# Patient Record
Sex: FEMALE | Race: WHITE | NOT HISPANIC OR LATINO | Employment: FULL TIME | URBAN - METROPOLITAN AREA
[De-identification: names, ages, dates, MRNs, and addresses within clinical notes are randomized per-mention and may not be internally consistent; named-entity substitution may affect disease eponyms.]

---

## 2017-01-02 DIAGNOSIS — F51.04 PSYCHOPHYSIOLOGICAL INSOMNIA: Primary | ICD-10-CM

## 2017-01-02 RX ORDER — ZOLPIDEM TARTRATE 5 MG/1
2.5-5 TABLET ORAL
Qty: 30 TABLET | Refills: 0 | Status: SHIPPED | OUTPATIENT
Start: 2017-01-02 | End: 2017-01-31

## 2017-01-02 NOTE — TELEPHONE ENCOUNTER
Ambien 5mg      Last Written Prescription Date:  10/11/2016  Last Fill Quantity: 30,   # refills: 2  Last Office Visit with [facility] prescribing provider: 7/19/2016  Future Office visit:       Routing refill request to provider for review/approval because:  Drug not on the [facility] refill protocol or controlled substance    Thank you,    Tom Schaffer, [facility] [place]  P. [phone]

## 2017-01-22 DIAGNOSIS — F51.04 PSYCHOPHYSIOLOGICAL INSOMNIA: Primary | ICD-10-CM

## 2017-01-22 RX ORDER — ZOLPIDEM TARTRATE 5 MG/1
2.5-5 TABLET ORAL
Qty: 30 TABLET | Refills: 0 | Status: CANCELLED | OUTPATIENT
Start: 2017-01-22

## 2017-01-22 NOTE — TELEPHONE ENCOUNTER
ambien 5mg      Last Written Prescription Date:  1/2/16  Last Fill Quantity: 30,   # refills: 0  Last Office Visit with [facility] prescribing provider: 7/19/16  Future Office visit:       Routing refill request to provider for review/approval because:  Drug not on the [facility] refill protocol or controlled substance    Thank you very kindly!  Laura Coulter [facility] [place]

## 2017-01-26 NOTE — TELEPHONE ENCOUNTER
Just checking to see if this is being approved or denied.  Thank you very kindly!  Laura Coulter [facility] [place]

## 2017-01-31 ENCOUNTER — OFFICE VISIT (OUTPATIENT)
Dept: FAMILY MEDICINE | Facility: CLINIC | Age: 57
End: 2017-01-31
Payer: COMMERCIAL

## 2017-01-31 VITALS
DIASTOLIC BLOOD PRESSURE: 72 MMHG | BODY MASS INDEX: 21.71 KG/M2 | HEIGHT: 62 IN | WEIGHT: 118 LBS | HEART RATE: 84 BPM | OXYGEN SATURATION: 100 % | SYSTOLIC BLOOD PRESSURE: 126 MMHG | TEMPERATURE: 97.2 F

## 2017-01-31 DIAGNOSIS — F51.04 PSYCHOPHYSIOLOGICAL INSOMNIA: Primary | ICD-10-CM

## 2017-01-31 DIAGNOSIS — J01.80 ACUTE NON-RECURRENT SINUSITIS OF OTHER SINUS: ICD-10-CM

## 2017-01-31 DIAGNOSIS — F17.200 NEEDS SMOKING CESSATION EDUCATION: ICD-10-CM

## 2017-01-31 DIAGNOSIS — Z28.01 VACCINATION NOT CARRIED OUT BECAUSE OF ACUTE ILLNESS: ICD-10-CM

## 2017-01-31 DIAGNOSIS — F17.200 TOBACCO USE DISORDER: ICD-10-CM

## 2017-01-31 PROCEDURE — 99214 OFFICE O/P EST MOD 30 MIN: CPT | Performed by: FAMILY MEDICINE

## 2017-01-31 RX ORDER — DOXYCYCLINE 100 MG/1
100 CAPSULE ORAL 2 TIMES DAILY
Qty: 14 CAPSULE | Refills: 0 | Status: SHIPPED | OUTPATIENT
Start: 2017-01-31 | End: 2017-02-07

## 2017-01-31 RX ORDER — ZOLPIDEM TARTRATE 5 MG/1
2.5-5 TABLET ORAL
Qty: 30 TABLET | Refills: 5 | Status: SHIPPED | OUTPATIENT
Start: 2017-01-31 | End: 2017-05-04

## 2017-01-31 ASSESSMENT — PAIN SCALES - GENERAL: PAINLEVEL: NO PAIN (0)

## 2017-01-31 NOTE — PROGRESS NOTES
"  SUBJECTIVE:                                                    Paco Medrano is a 57 year old female who presents to clinic today for the following health issues:    1. Medication Followup of Ambien    Taking Medication as prescribed: yes, every night    Side Effects: None    Medication Helping Symptoms: yes     2. ENT \"sinus\" Symptoms             Symptoms: cc Present Absent Comment   Fever/Chills  x  Chills, hot & cold, first symptom   Fatigue  x  First symptom   Muscle Aches   x    Eye Irritation   x    Sneezing   x    Nasal Tyrel/Drg  x     Sinus Pressure/Pain x x  Left, cheek > forehead, pressure behind eyes too. Current worst symptom   Loss of smell   x    Dental pain   x    Sore Throat   x    Swollen Glands   x    Ear Pain/Fullness   x    Cough   x    Wheeze   x    Chest Pain   x    Shortness of breath   x    Rash   x    Other  x  Stiff shoulders and neck     Symptom duration:  about 7-10 days   Symptom severity:  moderate   Treatments tried:  Advil cold & sinus & saline drops, increase fluids, & rest   Contacts:  work     Medications updated and reviewed.  Past, family and surgical history is updated and reviewed in the record.  ROS:  Other than noted above, general, HEENT, respiratory, cardiac and gastrointestinal systems are negative.       Any history above obtained by the Medical Assistant was reviewed by Dr. Rodney Desai MD, and edited when necessary.    This document serves as a record of the services and decisions personally performed and made by Dr. Desai. It was created on his behalf by Candace Alvarenga, a trained medical scribe. The creation of this document is based on the provider's statements to the medical scribe.  Candace Alvarenga January 31, 2017 4:04 PM   OBJECTIVE:                                                    /72 mmHg  Pulse 84  Temp(Src) 97.2  F (36.2  C) (Oral)  Ht 5' 1.75\" (1.568 m)  Wt 118 lb (53.524 kg)  BMI 21.77 kg/m2  SpO2 100%  Breastfeeding? No  Body mass " index is 21.77 kg/(m^2).  GENERAL APPEARANCE ADULT: Alert, no acute distress  EYES: PERRL, EOM normal, conjunctiva and lids normal  HENT: right TM normal, left TM normal, nose: mucosal erythema, frontal sinus tenderness left, maxillary sinus tenderness left, throat/mouth:normal, mucous membranes moist  NECK: No adenopathy,masses or thyromegaly  RESP: lungs clear to auscultation   CV: normal rate, regular rhythm, no murmur or gallop  SKIN: no suspicious lesions or rashes  NEURO: Alert, oriented, speech and mentation normal, Cranial nerves 2-12 are normal.  PSYCH: mentation appears normal, affect and mood normal     ASSESSMENT/PLAN:                                                    (F51.04) Psychophysiological insomnia  (primary encounter diagnosis)  Comment: refill request. As I stated before, the medicine is effective and I don't see a reason to discontinue this  Plan: zolpidem (AMBIEN) 5 MG tablet        Follow up in July     (J01.80) Acute non-recurrent sinusitis of other sinus  Comment: frontal and maxillary  Plan: doxycycline Monohydrate 100 MG CAPS        Follow up if symptoms worsen or fail to resolve by 2/10/17      (F17.200) Tobacco use disorder  (F17.200) Needs smoking cessation education  Comment:   Plan: NOVU Referral Smoking Cessation            (Z28.01) Vaccination not carried out because of acute illness  Comment: The patient is probably willing to get a vaccine once her sinus infection is cleared.  Plan:        The information in this document, created by the medical scribe for me, accurately reflects the services I personally performed and the decisions made by me. I have reviewed and approved this document for accuracy prior to leaving the patient care area.    Rodney Desai MD

## 2017-01-31 NOTE — PATIENT INSTRUCTIONS
================================================================================  Normal Values   Blood pressure  <140/90 for most adults    <130/80 for some chronic diseases (ask your care team about yours)    BMI (body mass index)  18.5-25 kg/m2 (based on height and weight)     Thank you for visiting [facility]    Normal or non-critical lab and imaging results will be communicated to you by MyChart, letter or phone within 7 days.  If you do not hear from us within 10 days, please call the clinic. If you have a critical or abnormal lab result, we will notify you by phone as soon as possible.     If you have any questions regarding your visit please contact:     Team Comfort:   Clinic Hours Telephone Number   Dr. Rodney Cedillo 7am-5pm  Monday - Friday [phone]  Markell RN  Celsa Carl RN   Pharmacy 8:00am-8pm Monday-Friday    9am-5pm Saturday-Sunday [phone]   Urgent Care 11am-9pm Monday-Friday        9am-5pm Saturday-Sunday [phone]     After hours, weekend or if you need to make an appointment with your primary provider please call [phone].   After Hours nurse advise: call [facility]: [phone]    Medication Refills:  Call your pharmacy and they will forward the refill to us. Please allow 3 business days for your refills to be completed.

## 2017-01-31 NOTE — NURSING NOTE
"Chief Complaint   Patient presents with     Recheck Medication     Ambien     Sinus Problem       Initial /72 mmHg  Pulse 84  Temp(Src) 97.2  F (36.2  C) (Oral)  Ht 5' 1.75\" (1.568 m)  Wt 118 lb (53.524 kg)  BMI 21.77 kg/m2  SpO2 100%  Breastfeeding? No Estimated body mass index is 21.77 kg/(m^2) as calculated from the following:    Height as of this encounter: 5' 1.75\" (1.568 m).    Weight as of this encounter: 118 lb (53.524 kg).  BP completed using cuff size: yane Wang MA      "

## 2017-01-31 NOTE — MR AVS SNAPSHOT
After Visit Summary   1/31/2017    Paco Medrano    MRN: [id number]           Patient Information     Date Of Birth          1960        Visit Information        Provider Department      1/31/2017 3:20 PM Rodney Desai MD [facility]        Today's Diagnoses     Psychophysiological insomnia    -  1     Acute non-recurrent sinusitis of other sinus         Tobacco use disorder         Needs smoking cessation education         Vaccination not carried out because of acute illness           Care Instructions        ================================================================================  Normal Values   Blood pressure  <140/90 for most adults    <130/80 for some chronic diseases (ask your care team about yours)    BMI (body mass index)  18.5-25 kg/m2 (based on height and weight)     Thank you for visiting [facility]    Normal or non-critical lab and imaging results will be communicated to you by MyChart, letter or phone within 7 days.  If you do not hear from us within 10 days, please call the clinic. If you have a critical or abnormal lab result, we will notify you by phone as soon as possible.     If you have any questions regarding your visit please contact:     Team Comfort:   Clinic Hours Telephone Number   Dr. Rodney [address] [facility] 7am-5pm  Monday - Friday [phone]  Markell Carl RN   Pharmacy 8:00am-8pm Monday-Friday    9am-5pm Saturday-Sunday [phone]   Urgent Care 11am-9pm Monday-Friday        9am-5pm Saturday-Sunday [phone]     After hours, weekend or if you need to make an appointment with your primary provider please call [phone].   After Hours nurse advise: call [facility]: [phone]    Medication Refills:  Call your pharmacy and they will forward the refill to us. Please allow 3 business days for your refills to be  "completed.                Follow-ups after your visit        Additional Services     [facility] Referral Smoking Cessation       [place] online at [email or web address]                  Follow-up notes from your care team     Return in about 10 days (around 2/10/2017) for if symptoms worsen or fail to resolve by then. Otherwise returnt for a recheck in July.      Who to contact     If you have questions or need follow up information about today's clinic visit or your schedule please contact [facility] directly at [phone].  Normal or non-critical lab and imaging results will be communicated to you by Flux Factoryhart, letter or phone within 4 business days after the clinic has received the results. If you do not hear from us within 7 days, please contact the clinic through Fl[employer] or phone. If you have a critical or abnormal lab result, we will notify you by phone as soon as possible.  Submit refill requests through DeepDyve or call your pharmacy and they will forward the refill request to us. Please allow 3 business days for your refill to be completed.          Additional Information About Your Visit        Flux Factoryhart Information     DeepDyve lets you send messages to your doctor, view your test results, renew your prescriptions, schedule appointments and more. To sign up, go to [email or web address] . Click on \"Log in\" on the left side of the screen, which will take you to the Welcome page. Then click on \"Sign up Now\" on the right side of the page.     You will be asked to enter the access code listed below, as well as some personal information. Please follow the directions to create your username and password.     Your access code is: [email or web address]  Expires: 3/9/2017  8:12 PM     Your access code will  in 90 days. If you need help or a new code, please call your [facility] clinic or [phone].        Care EveryWhere ID     This is your Care EveryWhere ID. This could be used by other " "organizations to access your [facility] medical records  [id number]        Your Vitals Were     Pulse Temperature Height BMI (Body Mass Index) Pulse Oximetry Breastfeeding?    84 97.2  F (36.2  C) (Oral) 5' 1.75\" (1.568 m) 21.77 kg/m2 100% No       Blood Pressure from Last 3 Encounters:   01/31/17 126/72   12/09/16 116/71   07/19/16 119/72    Weight from Last 3 Encounters:   01/31/17 118 lb (53.524 kg)   12/09/16 119 lb (53.978 kg)   07/19/16 119 lb (53.978 kg)              We Performed the Following     NOVU Referral Smoking Cessation          Today's Medication Changes          These changes are accurate as of: 1/31/17  4:13 PM.  If you have any questions, ask your nurse or doctor.               Start taking these medicines.        Dose/Directions    doxycycline Monohydrate 100 MG Caps   Used for:  Acute non-recurrent sinusitis of other sinus   Started by:  Rodney Desia MD        Dose:  100 mg   Take 1 capsule (100 mg) by mouth 2 times daily for 7 days for sinus infection.   Quantity:  14 capsule   Refills:  0            Where to get your medicines      These medications [place] [facility] [place] - [address]  [address], [facility] [address]     Phone:  [phone]    - doxycycline Monohydrate 100 MG Caps      Some of these will need a paper prescription and others can be bought over the counter.  Ask your nurse if you have questions.     Bring a paper prescription for each of these medications    - zolpidem 5 MG tablet             Primary Care Provider Office Phone # Fax #    Rodney Desai [phone]       [facility] [address]  [facility] [address]        Thank you!     Thank you for choosing [facility]  for your care. Our goal is always to provide you with excellent care. Hearing back from our patients is one way we can continue to improve our services. Please take a few minutes to complete " the written survey that you may receive in the mail after your visit with us. Thank you!             Your Updated Medication List - Protect others around you: Learn how to safely use, store and throw away your medicines at www.disposemymeds.org.          This list is accurate as of: 1/31/17  4:13 PM.  Always use your most recent med list.                   Brand Name Dispense Instructions for use    acetaminophen-codeine 300-30 MG per tablet    TYLENOL #3         ADVIL COLD & SINUS LIQUI-GELS  MG Caps   Generic drug:  Pseudoephedrine-Ibuprofen          ALEVE 220 MG capsule   Generic drug:  naproxen sodium          doxycycline Monohydrate 100 MG Caps     14 capsule    Take 1 capsule (100 mg) by mouth 2 times daily for 7 days for sinus infection.       zolpidem 5 MG tablet    AMBIEN    30 tablet    Take 0.5-1 tablets (2.5-5 mg) by mouth nightly as needed for sleep (take right at bedtime)

## 2017-05-03 ENCOUNTER — TELEPHONE (OUTPATIENT)
Dept: FAMILY MEDICINE | Facility: CLINIC | Age: 57
End: 2017-05-03

## 2017-05-03 DIAGNOSIS — F51.04 PSYCHOPHYSIOLOGICAL INSOMNIA: ICD-10-CM

## 2017-05-03 RX ORDER — ZOLPIDEM TARTRATE 5 MG/1
2.5-5 TABLET ORAL
Qty: 30 TABLET | Refills: 5 | Status: CANCELLED | OUTPATIENT
Start: 2017-05-03

## 2017-05-03 NOTE — TELEPHONE ENCOUNTER
Call pharmacy regarding message below.  Pharmacy stated that patient was in [place] and she transferred her Ambien from [facility] to [facility] in [place].  Patient is now back in [place] and those refill for Ambien is down in [facility].  By the state law, RX can only be transfer once and they can't transfer medication back to [place] per pharmacist.  Need new rx for Ambien send to [facility] in [place].    Marcos Brown MA

## 2017-05-03 NOTE — TELEPHONE ENCOUNTER
Reason for Call:  Other prescription    Detailed comments: [facility] calling for they attempted to do an order for Ambien for it did have a previous refills attached to the order  but it did not process.     Phone Number Pharmacy  can be reached at: Other phone number:   [phone]    Best Time: Anytime    Can we leave a detailed message on this number? YES    Call taken on 5/3/2017 at 10:00 AM by Charanjit Rothman

## 2017-05-03 NOTE — TELEPHONE ENCOUNTER
Reason for Call:  Other prescription    Detailed comments: Patient calling back to follow up on previous request to transfer Rx back to our pharmacy.      Phone Number Patient can be reached at: Home number on file [phone] (home)    Best Time: Any    Can we leave a detailed message on this number? YES    Call taken on 5/3/2017 at 5:53 PM by Terri Palencia

## 2017-05-03 NOTE — TELEPHONE ENCOUNTER
Last Rx was sent 1/31/17 for 6 month supply to  pharmacy - I am assuming refills were transferred to [facility].    MA: please call pharmacy to get more information. Are they asking for a PA or saying that Ambien is not covered?

## 2017-05-04 RX ORDER — ZOLPIDEM TARTRATE 5 MG/1
2.5-5 TABLET ORAL
Qty: 30 TABLET | Refills: 2 | Status: SHIPPED | OUTPATIENT
Start: 2017-05-04 | End: 2017-05-25

## 2017-05-04 NOTE — TELEPHONE ENCOUNTER
Patient called back wants this addressed ASAP as she has not slept for 2 days.    Dede Alexander RN, [facility]

## 2017-05-04 NOTE — TELEPHONE ENCOUNTER
Script is faxed to [facility] in [place] with a note for pharmacy to expedite the refill and call pt when medication is ready for pickup today.  Noe Leonard,  [facility]

## 2017-05-25 ENCOUNTER — OFFICE VISIT (OUTPATIENT)
Dept: FAMILY MEDICINE | Facility: CLINIC | Age: 57
End: 2017-05-25
Payer: COMMERCIAL

## 2017-05-25 VITALS
HEART RATE: 79 BPM | WEIGHT: 121 LBS | DIASTOLIC BLOOD PRESSURE: 78 MMHG | SYSTOLIC BLOOD PRESSURE: 124 MMHG | HEIGHT: 62 IN | OXYGEN SATURATION: 100 % | TEMPERATURE: 97.7 F | BODY MASS INDEX: 22.26 KG/M2

## 2017-05-25 DIAGNOSIS — J01.00 ACUTE NON-RECURRENT MAXILLARY SINUSITIS: Primary | ICD-10-CM

## 2017-05-25 DIAGNOSIS — M54.2 POSTERIOR NECK PAIN: ICD-10-CM

## 2017-05-25 DIAGNOSIS — F17.200 NEEDS SMOKING CESSATION EDUCATION: ICD-10-CM

## 2017-05-25 DIAGNOSIS — T70.1XXA: ICD-10-CM

## 2017-05-25 DIAGNOSIS — F51.04 PSYCHOPHYSIOLOGICAL INSOMNIA: ICD-10-CM

## 2017-05-25 PROCEDURE — 99214 OFFICE O/P EST MOD 30 MIN: CPT | Performed by: FAMILY MEDICINE

## 2017-05-25 RX ORDER — ETODOLAC 400 MG
400 TABLET ORAL 2 TIMES DAILY
Qty: 60 TABLET | Refills: 0 | Status: SHIPPED | OUTPATIENT
Start: 2017-05-25 | End: 2017-09-15

## 2017-05-25 RX ORDER — ZOLPIDEM TARTRATE 5 MG/1
2.5-5 TABLET ORAL
Qty: 30 TABLET | Refills: 1 | Status: SHIPPED | OUTPATIENT
Start: 2017-05-25 | End: 2017-07-18

## 2017-05-25 RX ORDER — CEFUROXIME AXETIL 250 MG/1
250 TABLET ORAL 2 TIMES DAILY
Qty: 20 TABLET | Refills: 0 | Status: SHIPPED | OUTPATIENT
Start: 2017-05-25 | End: 2017-06-04

## 2017-05-25 NOTE — PATIENT INSTRUCTIONS
This summary includes the important diagnoses, test, medications and other important parts of your medical history.  Below are a few good we sites you can use to learn more about these.     Www.[facility].org : Up to date and easily searchable information on multiple topics.  [email or web address].[email or web address] : [facility] $4.99 medications  Www.medlineplus.gov : medication info, interactive tutorials, watch real surgeries online  Www.familydoctor.org : good info from the Academy of Family Physicians  Www.[facility].com : good info from the [facility]  Www.cdc.gov : public health info, travel advisories, epidemics (H1N1)  Www.aap.org : children's health info, normal development, vaccinations  Www.health.[facility].[place].us : [place] dept of heat, public health issues in [place], N1N1    Based on your medical history and these are the current health maintenance or preventive care services that you are due for (some may have been done at this visit:)  There are no preventive care reminders to display for this patient.  =================================================================================  Normal Values   Blood pressure  <140/90 for most adults    <130/80 for some chronic diseases (ask your care team about yours)    BMI (body mass index)  18.5-25 kg/m2 (based on height and weight)     Thank you for visiting [facility]    Normal or non-critical lab and imaging results will be communicated to you by MyChart, letter or phone within 7 days.  If you do not hear from us within 10 days, please call the clinic. If you have a critical or abnormal lab result, we will notify you by phone as soon as possible.     If you have any questions regarding your visit please contact:     Team Comfort:   Clinic Hours Telephone Number   Dr. Rodney Samano   7am-5pm  Monday - Friday [phone]  Dustin DOUGLAS   Pharmacy 8am-8pm  Monday-Thursday      8am-6pm Friday  9am-5pm Saturday-Sunday [phone]   Urgent Care 11am-8pm Monday-Friday        9am-5pm Saturday-Sunday [phone]     After hours, weekend or if you need to make an appointment with your primary provider please call [phone].   After Hours nurse advise: call [facility]: [phone]    Medication Refills:  Call your pharmacy and they will forward the refill to us. Please allow 3 business days for your refills to be completed.    Use FunCaptcha (secure email communication and access to your chart) to send your primary care provider a message or make an appointment. Ask someone on your Team how to sign up for FunCaptcha. To log on to [employer] or for more information in [employer] please visit the website at [email or web address].  As of October 8, 2013, all password changes, disabled accounts, or ID changes in FunCaptcha/MyHealth will be done by our Access Services Department.   If you need help with your account or password, call: [phone]. Clinic staff no longer has the ability to change passwords.

## 2017-05-25 NOTE — NURSING NOTE
"Chief Complaint   Patient presents with     Sinus Problem     Musculoskeletal Problem     left shoulder ache due to lifting       Initial /78 (BP Location: Left arm, Patient Position: Chair, Cuff Size: Adult Regular)  Pulse 79  Temp 97.7  F (36.5  C) (Oral)  Ht 5' 1.75\" (1.568 m)  Wt 121 lb (54.9 kg)  SpO2 100%  BMI 22.31 kg/m2 Estimated body mass index is 22.31 kg/(m^2) as calculated from the following:    Height as of this encounter: 5' 1.75\" (1.568 m).    Weight as of this encounter: 121 lb (54.9 kg).  Medication Reconciliation: complete     Marcos Brown MA      "

## 2017-05-25 NOTE — MR AVS SNAPSHOT
After Visit Summary   5/25/2017    Paco Medrano    MRN: [id number]           Patient Information     Date Of Birth          1960        Visit Information        Provider Department      5/25/2017 3:40 PM Rodney Desai MD [facility]        Today's Diagnoses     Acute non-recurrent maxillary sinusitis    -  1    Posterior neck pain        Sinus barotrauma, initial encounter        Psychophysiological insomnia        Needs smoking cessation education          Care Instructions    This summary includes the important diagnoses, test, medications and other important parts of your medical history.  Below are a few good we sites you can use to learn more about these.     Www.Tianji.VISENZE : Up to date and easily searchable information on multiple topics.  Www.Tianji.VISENZE/Pharmacy/c_539084.asp : [facility] $4.99 medications  Www.Connoshoer.gov : medication info, interactive tutorials, watch real surgeries online  Www.familydoctor.org : good info from the Academy of Family Physicians  Www.[employer].Orlumet : good info from the [facility]  Www.cdc.gov : public health info, travel advisories, epidemics (H1N1)  Www.aap.org : children's health info, normal development, vaccinations  Www.health.[facility].[place].us : [place] dept of heatlh, public health issues in [place], N1N1    Based on your medical history and these are the current health maintenance or preventive care services that you are due for (some may have been done at this visit:)  There are no preventive care reminders to display for this patient.  =================================================================================  Normal Values   Blood pressure  <140/90 for most adults    <130/80 for some chronic diseases (ask your care team about yours)    BMI (body mass index)  18.5-25 kg/m2 (based on height and weight)     Thank you for visiting [facility]    Normal or non-critical lab and imaging results  will be communicated to you by MyChart, letter or phone within 7 days.  If you do not hear from us within 10 days, please call the clinic. If you have a critical or abnormal lab result, we will notify you by phone as soon as possible.     If you have any questions regarding your visit please contact:     Team Comfort:   Clinic Hours Telephone Number   Dr. Rodney Samano   7am-5pm  Monday - Friday [phone]  [facility] DOUGLAS   Pharmacy 8am-8pm Monday-Thursday      8am-6pm Friday  9am-5pm Saturday-Sunday [phone]   Urgent Care 11am-8pm Monday-Friday        9am-5pm Saturday-Sunday [phone]     After hours, weekend or if you need to make an appointment with your primary provider please call [phone].   After Hours nurse advise: call [place] [facility]: [phone]    Medication Refills:  Call your pharmacy and they will forward the refill to us. Please allow 3 business days for your refills to be completed.    Use [employer] (secure email communication and access to your chart) to send your primary care provider a message or make an appointment. Ask someone on your Team how to sign up for [employer].. To log on to Glassbeam or for more information in Minutizer please visit the website at www.[email or web address]/[employer]..  As of October 8, 2013, all password changes, disabled accounts, or ID changes in [employer]./MyHealth will be done by our Access Services Department.   If you need help with your account or password, call: [phone]. Clinic staff no longer has the ability to change passwords.             Follow-ups after your visit        Additional Services     NOVU Referral Smoking Cessation       Grant online at [email or web address]            OTOLARYNGOLOGY REFERRAL       Your provider has referred you to: JOSE: [facility] [phone]   [email or web address]  JOSE: Sahara Fenton  [facility] [place] [phone]   [email or web address]  FMG: [facility] [place] [phone]   [email or web address]  UMP: [facility] (Otolaryngology) [facility] [phone]  [email or web address]  UMP: [facility] [phone]   [email or web address]/[facility]/    Please be aware that coverage of these services is subject to the terms and limitations of your health insurance plan.  Call member services at your health plan with any benefit or coverage questions.      Please bring the following with you to your appointment:    (1) Any X-Rays, CTs or MRIs which have been performed.  Contact the facility where they were done to arrange for  prior to your scheduled appointment.   (2) List of current medications  (3) This referral request   (4) Any documents/labs given to you for this referral                  Follow-up notes from your care team     Return in about 2 weeks (around 6/8/2017) for recheck if symptoms have not resolved by then. .      Who to contact     If you have questions or need follow up information about today's clinic visit or your schedule please contact [facility] directly at [phone].  Normal or non-critical lab and imaging results will be communicated to you by MyChart, letter or phone within 4 business days after the clinic has received the results. If you do not hear from us within 7 days, please contact the clinic through MyChart or phone. If you have a critical or abnormal lab result, we will notify you by phone as soon as possible.  Submit refill requests through [employer] or call your pharmacy and they will forward the refill request to us. Please allow 3 business days for your refill to be completed.          Additional Information About Your Visit    "     MyChart Information     [employer] lets you send messages to your doctor, view your test results, renew your prescriptions, schedule appointments and more. To sign up, go to [email or web address] . Click on \"Log in\" on the left side of the screen, which will take you to the Welcome page. Then click on \"Sign up Now\" on the right side of the page.     You will be asked to enter the access code listed below, as well as some personal information. Please follow the directions to create your username and password.     Your access code is: [email or web address]  Expires: 2017  4:16 PM     Your access code will  in 90 days. If you need help or a new code, please call your [place] clinic or [phone].        Care EveryWhere ID     This is your Care EveryWhere ID. This could be used by other organizations to access your [place] medical records  [id number]        Your Vitals Were     Pulse Temperature Height Pulse Oximetry BMI (Body Mass Index)       79 97.7  F (36.5  C) (Oral) 1.568 m (5' 1.75\") 100% 22.31 kg/m2        Blood Pressure from Last 3 Encounters:   17 124/78   17 126/72   16 116/71    Weight from Last 3 Encounters:   17 54.9 kg (121 lb)   17 53.5 kg (118 lb)   16 54 kg (119 lb)              We Performed the Following     NOVU Referral Smoking Cessation     OTOLARYNGOLOGY REFERRAL          Today's Medication Changes          These changes are accurate as of: 17  4:16 PM.  If you have any questions, ask your nurse or doctor.               Start taking these medicines.        Dose/Directions    cefuroxime 250 MG tablet   Commonly known as:  CEFTIN   Used for:  Acute non-recurrent maxillary sinusitis   Started by:  Rodney Desai MD        Dose:  250 mg   Take 1 tablet (250 mg) by mouth 2 times daily for 10 days for sinus infection.   Quantity:  20 tablet   Refills:  0       etodolac 400 MG tablet   Commonly known as:  LODINE   Used for:  Posterior neck pain "   Started by:  Rodney Desai MD        Dose:  400 mg   Take 1 tablet (400 mg) by mouth 2 times daily As needed for neck/shoulder pain.   Quantity:  60 tablet   Refills:  0            Where to get your medicines      These medications were sent to [facility] [place], [place] - [address]  [address], [place] [address]     Phone:  [phone]     cefuroxime 250 MG tablet    etodolac 400 MG tablet         Some of these will need a paper prescription and others can be bought over the counter.  Ask your nurse if you have questions.     Bring a paper prescription for each of these medications     zolpidem 5 MG tablet                Primary Care Provider Office Phone # Fax #    Rodney Desai [phone]       [facility] [address]  [facility] [address]        Thank you!     Thank you for choosing [facility]  for your care. Our goal is always to provide you with excellent care. Hearing back from our patients is one way we can continue to improve our services. Please take a few minutes to complete the written survey that you may receive in the mail after your visit with us. Thank you!             Your Updated Medication List - Protect others around you: Learn how to safely use, store and throw away your medicines at www.disposemymeds.org.          This list is accurate as of: 5/25/17  4:16 PM.  Always use your most recent med list.                   Brand Name Dispense Instructions for use    ADVIL COLD & SINUS LIQUI-GELS  MG Caps   Generic drug:  Pseudoephedrine-Ibuprofen          cefuroxime 250 MG tablet    CEFTIN    20 tablet    Take 1 tablet (250 mg) by mouth 2 times daily for 10 days for sinus infection.       etodolac 400 MG tablet    LODINE    60 tablet    Take 1 tablet (400 mg) by mouth 2 times daily As needed for neck/shoulder pain.       zolpidem 5 MG tablet    AMBIEN    30 tablet    Take 0.5-1 tablets  (2.5-5 mg) by mouth nightly as needed for sleep (take right at bedtime)

## 2017-05-25 NOTE — PROGRESS NOTES
SUBJECTIVE:                                                    Paco Medrano is a 57 year old female who presents to clinic today for the following health issues:        Acute Illness   Acute illness concerns: sinus  Onset: 2 weeks     Fever: no    Chills/Sweats: YES    Headache (location?): YES, initial symptom    Sinus Pressure:YES, initial symptom, worst symptom    Conjunctivitis:  no    Ear Pain: no    Rhinorrhea: no    Congestion: YES    Sore Throat: no     Cough: no    Wheeze: no    Decreased Appetite: no    Nausea: no    Vomiting: no    Diarrhea:  no    Dysuria/Freq.: no    Fatigue/Achiness: YES- pulled muscle from lifting on left shoulder    Sick/Strep Exposure: patient at pharmacy     Therapies Tried and outcome: ibuprofen, cold sinus otc, little relief      Medications updated and reviewed.  Past, family and surgical history is updated and reviewed in the record.  ROS:  Other than noted above, general, HEENT, respiratory, cardiac and gastrointestinal systems are negative.  MS: 2 days ago, strained her neck while lifting heavy objects, now has pain along the left posterior neck.   ENT: Patient reports that she gets sever frontal pain while descending in an airplane.  Occasionally there will be blood when she blows her nose afterwards. She reports a history of recurrent sinus infections, and sinus surgery has been recommended to her remotely.    Any history above obtained by the Medical Assistant was reviewed by Dr. Rodney Desai MD, and edited when necessary.    This document serves as a record of the services and decisions personally performed and made by Dr. Desai. It was created on his behalf by Rodney Rich, a trained medical scribe. The creation of this document is based the provider's statements to the medical scribe.  May 25, 2017, 4:05 PM     OBJECTIVE:  /78 (BP Location: Left arm, Patient Position: Chair, Cuff Size: Adult Regular)  Pulse 79  Temp 97.7  F (36.5  C) (Oral)  Ht 5'  "1.75\" (1.568 m)  Wt 121 lb (54.9 kg)  SpO2 100%  BMI 22.31 kg/m2  GENERAL APPEARANCE ADULT: Alert, no acute distress  EYES: PERRL, EOM normal, conjunctiva and lids normal  HENT: right TM normal, left TM normal, nose clear rhinorrhea, frontal sinus tenderness minimal, maxillary sinus tenderness left, throat/mouth:normal, mucous membranes moist  NECK: No adenopathy,masses or thyromegaly  RESP: lungs clear to auscultation   CV: normal rate, regular rhythm, no murmur or gallop  MS: extremities normal, no peripheral edema  SKIN: no suspicious lesions or rashes  NEURO: Alert, oriented, speech and mentation normal, Cranial nerves 2-12 are normal.  PSYCH: mentation appears normal., affect and mood normal    ASSESSMENT/PLAN:  (J01.00) Acute non-recurrent maxillary sinusitis  (primary encounter diagnosis)  Comment: Left  Plan: cefuroxime (CEFTIN) 250 MG tablet        Follow up if symptoms worsen, or fail to resolve after 14 days.    (M54.2) Posterior neck pain  Comment: Left  Plan: etodolac (LODINE) 400 MG tablet            (T70.1XXA) Sinus barotrauma, initial encounter  Comment:   Plan: OTOLARYNGOLOGY REFERRAL            (F51.04) Psychophysiological insomnia  Comment: Patient reports decreased effectiveness from the  used by [facility]   Plan: zolpidem (AMBIEN) 5 MG tablet        She will fill these at our pharmacy from now on    (F17.200) Needs smoking cessation education  Comment:   Plan: NOVU Referral Smoking Cessation              The information in this document, created by the medical scribe for me, accurately reflects the services I personally performed and the decisions made by me. I have reviewed and approved this document for accuracy prior to leaving the patient care area.  Rodney Desai MD  "

## 2017-06-17 ENCOUNTER — OFFICE VISIT (OUTPATIENT)
Dept: URGENT CARE | Facility: URGENT CARE | Age: 57
End: 2017-06-17
Payer: COMMERCIAL

## 2017-06-17 VITALS
HEART RATE: 89 BPM | DIASTOLIC BLOOD PRESSURE: 68 MMHG | OXYGEN SATURATION: 98 % | BODY MASS INDEX: 22.13 KG/M2 | WEIGHT: 120 LBS | TEMPERATURE: 97.5 F | SYSTOLIC BLOOD PRESSURE: 104 MMHG

## 2017-06-17 DIAGNOSIS — J01.01 ACUTE RECURRENT MAXILLARY SINUSITIS: Primary | ICD-10-CM

## 2017-06-17 PROCEDURE — 99213 OFFICE O/P EST LOW 20 MIN: CPT | Performed by: PHYSICIAN ASSISTANT

## 2017-06-17 RX ORDER — DOXYCYCLINE 100 MG/1
100 CAPSULE ORAL 2 TIMES DAILY
Qty: 42 CAPSULE | Refills: 0 | Status: SHIPPED | OUTPATIENT
Start: 2017-06-17 | End: 2017-07-08

## 2017-06-17 RX ORDER — FLUTICASONE PROPIONATE 50 MCG
1-2 SPRAY, SUSPENSION (ML) NASAL DAILY
Qty: 1 BOTTLE | Refills: 0 | Status: SHIPPED | OUTPATIENT
Start: 2017-06-17 | End: 2018-03-27

## 2017-06-17 NOTE — MR AVS SNAPSHOT
"              After Visit Summary   2017    Paco Medrano    MRN: [id number]           Patient Information     Date Of Birth          1960        Visit Information        Provider Department      2017 4:20 PM Jaylyn Amezquita PA-C [facility]        Today's Diagnoses     Acute recurrent maxillary sinusitis    -  1       Follow-ups after your visit        Who to contact     If you have questions or need follow up information about today's clinic visit or your schedule please contact [facility] directly at [phone].  Normal or non-critical lab and imaging results will be communicated to you by [employer], letter or phone within 4 business days after the clinic has received the results. If you do not hear from us within 7 days, please contact the clinic through [employer] or phone. If you have a critical or abnormal lab result, we will notify you by phone as soon as possible.  Submit refill requests through [employer] or call your pharmacy and they will forward the refill request to us. Please allow 3 business days for your refill to be completed.          Additional Information About Your Visit        MyChart Information     [employer] lets you send messages to your doctor, view your test results, renew your prescriptions, schedule appointments and more. To sign up, go to [email or web address] . Click on \"Log in\" on the left side of the screen, which will take you to the Welcome page. Then click on \"Sign up Now\" on the right side of the page.     You will be asked to enter the access code listed below, as well as some personal information. Please follow the directions to create your username and password.     Your access code is: [email or web address]  Expires: 2017  4:16 PM     Your access code will  in 90 days. If you need help or a new code, please call your [facility] or [phone].        Care EveryWhere ID     This is your Care EveryWhere ID. This " could be used by other organizations to access your [place] medical records  [id number]        Your Vitals Were     Pulse Temperature Pulse Oximetry BMI (Body Mass Index)          89 97.5  F (36.4  C) (Oral) 98% 22.13 kg/m2         Blood Pressure from Last 3 Encounters:   06/17/17 104/68   05/25/17 124/78   01/31/17 126/72    Weight from Last 3 Encounters:   06/17/17 120 lb (54.4 kg)   05/25/17 121 lb (54.9 kg)   01/31/17 118 lb (53.5 kg)              Today, you had the following     No orders found for display         Today's Medication Changes          These changes are accurate as of: 6/17/17  4:41 PM.  If you have any questions, ask your nurse or doctor.               Start taking these medicines.        Dose/Directions    doxycycline 100 MG capsule   Commonly known as:  VIBRAMYCIN   Used for:  Acute recurrent maxillary sinusitis   Started by:  Jaylyn Amezquita PA-C        Dose:  100 mg   Take 1 capsule (100 mg) by mouth 2 times daily for 21 days   Quantity:  42 capsule   Refills:  0       fluticasone 50 MCG/ACT spray   Commonly known as:  FLONASE   Used for:  Acute recurrent maxillary sinusitis   Started by:  Jaylyn Amezquita PA-C        Dose:  1-2 spray   Spray 1-2 sprays into both nostrils daily   Quantity:  1 Bottle   Refills:  0            Where to get your medicines      These [place] [facility] [place] [address]  [address], [facility] [address]     Phone:  [phone]     doxycycline 100 MG capsule    fluticasone 50 MCG/ACT spray                Primary Care Provider Office Phone # Fax #    Rodney Desai [phone]       [facility] [address]  [facility] [address]        Thank you!     Thank you for choosing [facility]  for your care. Our goal is always to provide you with excellent care. Hearing back from our patients is one way we can continue to improve our  services. Please take a few minutes to complete the written survey that you may receive in the mail after your visit with us. Thank you!             Your Updated Medication List - Protect others around you: Learn how to safely use, store and throw away your medicines at www.disposemymeds.org.          This list is accurate as of: 6/17/17  4:41 PM.  Always use your most recent med list.                   Brand Name Dispense Instructions for use    ADVIL COLD & SINUS LIQUI-GELS  MG Caps   Generic drug:  Pseudoephedrine-Ibuprofen          doxycycline 100 MG capsule    VIBRAMYCIN    42 capsule    Take 1 capsule (100 mg) by mouth 2 times daily for 21 days       etodolac 400 MG tablet    LODINE    60 tablet    Take 1 tablet (400 mg) by mouth 2 times daily As needed for neck/shoulder pain.       fluticasone 50 MCG/ACT spray    FLONASE    1 Bottle    Spray 1-2 sprays into both nostrils daily       zolpidem 5 MG tablet    AMBIEN    30 tablet    Take 0.5-1 tablets (2.5-5 mg) by mouth nightly as needed for sleep (take right at bedtime)

## 2017-06-17 NOTE — NURSING NOTE
"Chief Complaint   Patient presents with     URI     couple of weeks       Initial /68 (BP Location: Left arm, Patient Position: Chair, Cuff Size: Adult Regular)  Pulse 89  Temp 97.5  F (36.4  C) (Oral)  Wt 120 lb (54.4 kg)  SpO2 98%  BMI 22.13 kg/m2 Estimated body mass index is 22.13 kg/(m^2) as calculated from the following:    Height as of 5/25/17: 5' 1.75\" (1.568 m).    Weight as of this encounter: 120 lb (54.4 kg).  Medication Reconciliation: complete     Kelli Samuels CMA      "

## 2017-06-17 NOTE — PROGRESS NOTES
SUBJECTIVE:                                                    Paco Medrano is a 57 year old female who presents to clinic today for the following health issues:      RESPIRATORY SYMPTOMS      Duration: 2 weeks    Description  nasal congestion, rhinorrhea, sore throat, facial pain/pressure, fever, chills, ear pain left, headache, fatigue/malaise and myalgias    Severity: moderate    Accompanying signs and symptoms: None    History (predisposing factors):  none    Precipitating or alleviating factors: None    Therapies tried and outcome:  rest and fluids oral decongestant antihistamine acetaminophen OTC NSAID nasal spray/wash - no relief     Cannot see ENT until July. 10 days Ceftin 250, no help    Allergies   Allergen Reactions     Azithromycin Nausea     Erythromycin      gi upset     Penicillins Unknown     Childhood     Zithromax [Azithromycin Dihydrate]      Septra [Sulfamethoxazole W-Trimethoprim] Rash     Trimethoprim Rash       No past medical history on file.      Current Outpatient Prescriptions on File Prior to Visit:  Pseudoephedrine-Ibuprofen (ADVIL COLD & SINUS LIQUI-GELS)  MG CAPS    etodolac (LODINE) 400 MG tablet Take 1 tablet (400 mg) by mouth 2 times daily As needed for neck/shoulder pain. (Patient not taking: Reported on 6/17/2017)   zolpidem (AMBIEN) 5 MG tablet Take 0.5-1 tablets (2.5-5 mg) by mouth nightly as needed for sleep (take right at bedtime)     No current facility-administered medications on file prior to visit.     Social History   Substance Use Topics     Smoking status: Current Every Day Smoker     Start date: 1/9/2012     Smokeless tobacco: Never Used     Alcohol use 0.0 oz/week     0 Standard drinks or equivalent per week       ROS:  Consitutional: As above  ENT: As above  Respiratory: As above    OBJECTIVE:  /68 (BP Location: Left arm, Patient Position: Chair, Cuff Size: Adult Regular)  Pulse 89  Temp 97.5  F (36.4  C) (Oral)  Wt 120 lb (54.4 kg)  SpO2 98%   BMI 22.13 kg/m2  GENERAL APPEARANCE: healthy, alert and no distress  EYES: conjunctiva clear  EARS: No cerumen.   Ear canals w/o erythema, TM's intact w/o erythema.    NOSE/MOUTH: Nose and mouth without ulcers, erythema or lesions  SINUSES: Left maxillary sinus tenderness, moderate.  THROAT: No erythema w/o tonsillar enlargement . No exudates  NECK: supple, nontender, no lymphadenopathy  RESP: lungs clear to auscultation - no rales, rhonchi or wheezes  CV: regular rates and rhythm, normal S1 S2, no murmur noted  NEURO: awake, alert        ASSESSMENT: Well appearing.    ICD-10-CM    1. Acute recurrent maxillary sinusitis J01.01 doxycycline (VIBRAMYCIN) 100 MG capsule     fluticasone (FLONASE) 50 MCG/ACT spray     PLAN:If resolved at 14 days may stop antibiotic, given 21 days worth  Lots of rest and fluids.  RTC if any worsening symptoms or if not improving.    Jaylyn Amezquita PA-C

## 2017-07-18 DIAGNOSIS — F51.04 PSYCHOPHYSIOLOGICAL INSOMNIA: ICD-10-CM

## 2017-07-18 NOTE — TELEPHONE ENCOUNTER
Zolpidem Tartrate 5MG TABS      Last Written Prescription Date:  05/25/2017  Last Fill Quantity: 30,   # refills: 1  [facility] prescribing provider: 06/17/2017  Future Office visit:       Routing refill request to provider for review/approval because:  Drug not on the [facility] refill protocol or controlled substance    Thank You  Stanton Ayala  Pharmacy Technician  [facility]

## 2017-07-19 RX ORDER — ZOLPIDEM TARTRATE 5 MG/1
2.5-5 TABLET ORAL
Qty: 30 TABLET | Refills: 1 | Status: SHIPPED | OUTPATIENT
Start: 2017-07-19 | End: 2017-09-21

## 2017-07-19 NOTE — TELEPHONE ENCOUNTER
Written rx signed by Dr. Cedillo and will be deliver to the pharmacy this afternoon.  Our pharmacy will notify pt when ready for pickup.  Noe Leonard,  [facility]

## 2017-09-08 ENCOUNTER — TELEPHONE (OUTPATIENT)
Dept: FAMILY MEDICINE | Facility: CLINIC | Age: 57
End: 2017-09-08

## 2017-09-08 NOTE — TELEPHONE ENCOUNTER
Called and left detailed msg for pt she will need an appt before medications can be prescribed. Pt may call our appt line to schedule the appt.  Noe Leonard,  [employer] [facility]

## 2017-09-08 NOTE — TELEPHONE ENCOUNTER
.Reason for Call:    sinus infection    Detailed comments: wondering if needs to be seen or can an PRESCRIPTION  can be called in to [facility]    Phone Number Patient can be reached at: Home number on file [phone] (home)    Best Time: anytime    Can we leave a detailed message on this number? YES    Call taken on 9/8/2017 at 12:55 PM by Wandy Quick

## 2017-09-15 ENCOUNTER — OFFICE VISIT (OUTPATIENT)
Dept: URGENT CARE | Facility: URGENT CARE | Age: 57
End: 2017-09-15
Payer: COMMERCIAL

## 2017-09-15 VITALS
TEMPERATURE: 97.6 F | OXYGEN SATURATION: 99 % | WEIGHT: 119 LBS | HEART RATE: 101 BPM | SYSTOLIC BLOOD PRESSURE: 122 MMHG | BODY MASS INDEX: 21.94 KG/M2 | DIASTOLIC BLOOD PRESSURE: 72 MMHG

## 2017-09-15 DIAGNOSIS — J01.01 ACUTE RECURRENT MAXILLARY SINUSITIS: Primary | ICD-10-CM

## 2017-09-15 PROCEDURE — 99213 OFFICE O/P EST LOW 20 MIN: CPT | Performed by: STUDENT IN AN ORGANIZED HEALTH CARE EDUCATION/TRAINING PROGRAM

## 2017-09-15 RX ORDER — DOXYCYCLINE 100 MG/1
100 CAPSULE ORAL 2 TIMES DAILY
Qty: 20 CAPSULE | Refills: 0 | Status: SHIPPED | OUTPATIENT
Start: 2017-09-15 | End: 2021-02-17

## 2017-09-15 NOTE — MR AVS SNAPSHOT
"              After Visit Summary   9/15/2017    Paco Medrano    MRN: [id number]           Patient Information     Date Of Birth          1960        Visit Information        Provider Department      9/15/2017 8:05 PM Palomo Zafar PA-C [facility]        Today's Diagnoses     Acute recurrent maxillary sinusitis    -  1       Follow-ups after your visit        Follow-up notes from your care team     Return for Recheck If symptoms worsen or fail to improve..      Who to contact     If you have questions or need follow up information about today's clinic visit or your schedule please contact [facility] directly at [phone].  Normal or non-critical lab and imaging results will be communicated to you by MyChart, letter or phone within 4 business days after the clinic has received the results. If you do not hear from us within 7 days, please contact the clinic through MyChart or phone. If you have a critical or abnormal lab result, we will notify you by phone as soon as possible.  Submit refill requests through [email or web address] or call your pharmacy and they will forward the refill request to us. Please allow 3 business days for your refill to be completed.          Additional Information About Your Visit        MyChart Information     [email or web address] lets you send messages to your doctor, view your test results, renew your prescriptions, schedule appointments and more. To sign up, go to [email or web address] . Click on \"Log in\" on the left side of the screen, which will take you to the Welcome page. Then click on \"Sign up Now\" on the right side of the page.     You will be asked to enter the access code listed below, as well as some personal information. Please follow the directions to create your username and password.     Your access code is: [email or web address]  Expires: 2017  8:31 PM     Your access code will  in 90 days. If you need help or a new code, please " call your [place] clinic or [phone].        Care EveryWhere ID     This is your Care EveryWhere ID. This could be used by other organizations to access your [place] medical records  [id number]        Your Vitals Were     Pulse Temperature Pulse Oximetry BMI (Body Mass Index)          101 97.6  F (36.4  C) (Oral) 99% 21.94 kg/m2         Blood Pressure from Last 3 Encounters:   09/15/17 122/72   06/17/17 104/68   05/25/17 124/78    Weight from Last 3 Encounters:   09/15/17 119 lb (54 kg)   06/17/17 120 lb (54.4 kg)   05/25/17 121 lb (54.9 kg)              Today, you had the following     No orders found for display         Today's Medication Changes          These changes are accurate as of: 9/15/17  8:31 PM.  If you have any questions, ask your nurse or doctor.               Start taking these medicines.        Dose/Directions    doxycycline Monohydrate 100 MG Caps   Used for:  Acute recurrent maxillary sinusitis        Dose:  100 mg   Take 1 capsule (100 mg) by mouth 2 times daily for 10 days   Quantity:  20 capsule   Refills:  0            Where to get your medicines      These medications were sent to [facility] [address] [place] [address] [address] [place] AT [facility] [address]  [address] [place] [address]     Phone:  [phone]     doxycycline Monohydrate 100 MG Caps                Primary Care Provider Office Phone # Fax #    Rodney Desai [phone] [address]  [place] [facility] [address]        Equal Access to Services     [facility]: Hadii aad ku hadasho Soomaali, waaxda luqadaha, qaybta kaalmada kervin smith . So [facility] [phone].    ATENCIÓN: Si habla español, tiene a carney disposición servicios gratuitos de asistencia lingüística. Llame al [phone].    We comply with applicable federal civil rights laws and [place] laws. We do not discriminate on the basis of race, color, national origin,  age, disability sex, sexual orientation or gender identity.            Thank you!     Thank you for choosing [facility] [place]  for your care. Our goal is always to provide you with excellent care. Hearing back from our patients is one way we can continue to improve our services. Please take a few minutes to complete the written survey that you may receive in the mail after your visit with us. Thank you!             Your Updated Medication List - Protect others around you: Learn how to safely use, store and throw away your medicines at www.disposemymeds.org.          This list is accurate as of: 9/15/17  8:31 PM.  Always use your most recent med list.                   Brand Name Dispense Instructions for use Diagnosis    ADVIL COLD & SINUS LIQUI-GELS  MG Caps   Generic drug:  Pseudoephedrine-Ibuprofen           doxycycline Monohydrate 100 MG Caps     20 capsule    Take 1 capsule (100 mg) by mouth 2 times daily for 10 days    Acute recurrent maxillary sinusitis       fluticasone 50 MCG/ACT spray    FLONASE    1 Bottle    Spray 1-2 sprays into both nostrils daily    Acute recurrent maxillary sinusitis       zolpidem 5 MG tablet    AMBIEN    30 tablet    Take 0.5-1 tablets (2.5-5 mg) by mouth nightly as needed for sleep (take right at bedtime)    Psychophysiological insomnia

## 2017-09-16 NOTE — NURSING NOTE
"Chief Complaint   Patient presents with     URI     since Labor Day       Initial /72 (BP Location: Left arm, Patient Position: Chair, Cuff Size: Adult Regular)  Pulse 101  Temp 97.6  F (36.4  C) (Oral)  Wt 119 lb (54 kg)  SpO2 99%  BMI 21.94 kg/m2 Estimated body mass index is 21.94 kg/(m^2) as calculated from the following:    Height as of 5/25/17: 5' 1.75\" (1.568 m).    Weight as of this encounter: 119 lb (54 kg).  Medication Reconciliation: complete     Kelli Samuels CMA      "

## 2017-09-16 NOTE — PROGRESS NOTES
SUBJECTIVE:   Paco Medrano is a 57 year old female who is here today with:  CC:nasal congestion, headache.and symptoms of fever.    Serious symptoms include: none applicable.    Onset of symptoms was 3weeks ago.   Course of illness is worsening.  none applicable exposures.   Treatment measures tried include Fluids and OTC meds.    All Medications, Allergies and Histories reviewed and current as of todays visit.    OBJECTIVE:   /72 (BP Location: Left arm, Patient Position: Chair, Cuff Size: Adult Regular)  Pulse 101  Temp 97.6  F (36.4  C) (Oral)  Wt 119 lb (54 kg)  SpO2 99%  BMI 21.94 kg/m2    GENERAL: healthy, alert and no distress  EYES:Lids and Conjunctival normal  EAR: CANAL and Left TM is normal: no effusions, no erythema, and normal landmarks, CANAL and Right TM is normal: no effusions, no erythema, and normal landmarks.  NOSE: normal. + maxillary sinus pressure  OROPHARYNX:normal.  NECK: normal, supple and no adenopathy  LUNGS:normal  HEART :regular rate and rhythm and no murmurs, clicks, or gallops  ABDOMEN:non-tender  SKIN:Warm, Dry and No Rash    ASSESSMENT:   1) Sinusitis    Paco was seen today for uri.    Diagnoses and all orders for this visit:    Acute recurrent maxillary sinusitis  -     doxycycline Monohydrate 100 MG CAPS; Take 1 capsule (100 mg) by mouth 2 times daily for 10 days      Acute sinusitis. Starting Doxycycline. Patient notes Augmentin does not work for her. Encouraged to use flonase, ibuprofen, and sudafed. Rest and oral hydration should be pushed. Return to clinic in 5 days if symptoms are not improving.    Patient had numerous antibiotic allergies listed. I discussed her allergies in detail. All allergies have been removed other than Erythromycin and Septa. She took part in this and agreed.    Palomo Zafar PA-C

## 2017-09-16 NOTE — PROGRESS NOTES
SUBJECTIVE:   Paco Medrano is a 57 year old female who presents to clinic today for the following health issues:      RESPIRATORY SYMPTOMS      Duration: since Labor Day    Description  nasal congestion, rhinorrhea, facial pain/pressure, chills, headache and fatigue/malaise    Severity: moderate    Accompanying signs and symptoms: None    History (predisposing factors):  none    Precipitating or alleviating factors: None    Therapies tried and outcome:  oral decongestant antihistamine garlic and no relief

## 2017-09-21 DIAGNOSIS — F51.04 PSYCHOPHYSIOLOGICAL INSOMNIA: ICD-10-CM

## 2017-09-21 NOTE — TELEPHONE ENCOUNTER
Ambien      Last Written Prescription Date: 7-19-17  Last Fill Quantity: 30,  # refills: 1   Last Office Visit with [facility], [facility] or [facility] prescribing provider: 5-25-17    [facility]  Pharmacy [facility]  On Behalf of [facility]

## 2017-09-22 RX ORDER — ZOLPIDEM TARTRATE 5 MG/1
2.5-5 TABLET ORAL
Qty: 30 TABLET | Refills: 1 | Status: SHIPPED | OUTPATIENT
Start: 2017-09-22 | End: 2017-11-14

## 2017-10-24 ENCOUNTER — TELEPHONE (OUTPATIENT)
Dept: FAMILY MEDICINE | Facility: CLINIC | Age: 57
End: 2017-10-24

## 2017-10-24 NOTE — TELEPHONE ENCOUNTER
Medication: zolpidem (AMBIEN) 5 MG tablet    Diagnosis & Code :Psychophysiological insomnia [F51.04]         Provider: What other medications tried, failed, when and why discontinue?      Prior Authorization Starts (date): 10/24/17    Insurance Plan: [employer] Federal  Patient ID: [id number]  RX BIN: [id number]  Phone: [phone]   Fax: Unknown  [employer] [place]: TORITO Ayala CMA

## 2017-10-24 NOTE — TELEPHONE ENCOUNTER
Insurance contacted via phone and received Prior auth APPROVAL.  Effective dates 8/25/17 - 10/24/18.    Kevin Ayala CMA

## 2017-10-24 NOTE — TELEPHONE ENCOUNTER
Kevin Ayala contacted [facility] on 10/24/17 and left a message. If patient calls back please contact care team.

## 2017-10-24 NOTE — TELEPHONE ENCOUNTER
Prior Auth Needed  Drug: Zolpidem 5mg Tab  Insurance: [facility] Federal  ID Number: [id number]  Phone Number: [phone]     Insurance only allows 60 per 365 days without a P.A.      Please do not close encounter until Prior Auth is approved or denied.    Thank You!  Venus Simmons [facility] [place]  [phone]

## 2017-11-14 DIAGNOSIS — F51.04 PSYCHOPHYSIOLOGICAL INSOMNIA: ICD-10-CM

## 2017-11-14 RX ORDER — ZOLPIDEM TARTRATE 5 MG/1
2.5-5 TABLET ORAL
Qty: 30 TABLET | Refills: 1 | Status: SHIPPED | OUTPATIENT
Start: 2017-11-23 | End: 2018-01-05

## 2017-11-14 NOTE — TELEPHONE ENCOUNTER
Zolpidem 5      Last Written Prescription Date:  9/22/17  Last Fill Quantity: 30,   # refills: 1  Future Office visit:       Routing refill request to provider for review/approval because:  Drug not on the [facility], [facility] or [facility] refill protocol or controlled substance  Thank you very kindly!  Laura Coulter Lawrence  [facility] [place]

## 2018-01-05 ENCOUNTER — OFFICE VISIT (OUTPATIENT)
Dept: FAMILY MEDICINE | Facility: CLINIC | Age: 58
End: 2018-01-05
Payer: COMMERCIAL

## 2018-01-05 VITALS
BODY MASS INDEX: 23.19 KG/M2 | TEMPERATURE: 98 F | HEIGHT: 62 IN | OXYGEN SATURATION: 99 % | DIASTOLIC BLOOD PRESSURE: 86 MMHG | HEART RATE: 100 BPM | SYSTOLIC BLOOD PRESSURE: 133 MMHG | WEIGHT: 126 LBS

## 2018-01-05 DIAGNOSIS — J01.01 ACUTE RECURRENT MAXILLARY SINUSITIS: Primary | ICD-10-CM

## 2018-01-05 DIAGNOSIS — Z28.21 VACCINATION NOT CARRIED OUT BECAUSE OF PATIENT REFUSAL: ICD-10-CM

## 2018-01-05 DIAGNOSIS — H53.9 VISION CHANGES: ICD-10-CM

## 2018-01-05 DIAGNOSIS — Z12.31 VISIT FOR SCREENING MAMMOGRAM: ICD-10-CM

## 2018-01-05 DIAGNOSIS — F51.04 PSYCHOPHYSIOLOGICAL INSOMNIA: ICD-10-CM

## 2018-01-05 PROCEDURE — 99214 OFFICE O/P EST MOD 30 MIN: CPT | Performed by: FAMILY MEDICINE

## 2018-01-05 RX ORDER — ZOLPIDEM TARTRATE 5 MG/1
2.5-5 TABLET ORAL
Qty: 30 TABLET | Refills: 5 | Status: SHIPPED | OUTPATIENT
Start: 2018-01-05 | End: 2018-07-10

## 2018-01-05 RX ORDER — DOXYCYCLINE 100 MG/1
100 CAPSULE ORAL 2 TIMES DAILY
Qty: 20 CAPSULE | Refills: 0 | Status: SHIPPED | OUTPATIENT
Start: 2018-01-05 | End: 2018-01-15

## 2018-01-05 ASSESSMENT — PAIN SCALES - GENERAL: PAINLEVEL: NO PAIN (0)

## 2018-01-05 NOTE — PATIENT INSTRUCTIONS
At [facility], we strive to deliver an exceptional experience to you, every time we see you.  If you receive a survey in the mail, please send us back your thoughts. We really do value your feedback.    Based on your medical history, these are the current health maintenance/preventive care services that you are due for (some may have been done at this visit.)  Health Maintenance Due   Topic Date Due     INFLUENZA VACCINE (SYSTEM ASSIGNED)  2017     MAMMO SCREEN Q2 YR (SYSTEM ASSIGNED)  12/10/2017         Suggested websites for health information:  Www.CornerBlue.org : Up to date and easily searchable information on multiple topics.  Www.medlineplus.gov : medication info, interactive tutorials, watch real surgeries online  Www.familydoctor.org : good info from the Academy of Family Physicians  Www.cdc.gov : public health info, travel advisories, epidemics (H1N1)  Www.aap.org : children's health info, normal development, vaccinations  Www.health.[facility].[place].us : [place] dept of health, public health issues in [place], N1N1    Your care team:                            Family Medicine Internal Medicine   MD Colton Brito MD Shantel Branch-Fleming, MD Katya Georgiev PA-C Nam Ho, MD Pediatrics   JANKI Perez, GUALBERTO JALLOH CNP   MD Yesenia Navarrete MD Deborah Mielke, MD Kim Thein, APRN CNP      Clinic hours: Monday - Thursday 7 am-7 pm;  7 am-5 pm.   Urgent care: Monday - Friday 11 am-9 pm; Saturday and  9 am-5 pm.  Pharmacy : Monday -Thursday 8 am-8 pm; Friday 8 am-6 pm; Saturday and  9 am-5 pm.     Clinic: [phone]   Pharmacy: [phone]    Please call [facility] Radiology/[phone] to schedule your mammogram.

## 2018-01-05 NOTE — MR AVS SNAPSHOT
After Visit Summary   1/5/2018    Paco Medrano    MRN: [id number]           Patient Information     Date Of Birth          1960        Visit Information        Provider Department      1/5/2018 4:00 PM Rodney Desai MD [facility]        Today's Diagnoses     Acute recurrent maxillary sinusitis    -  1    Vision changes        Visit for screening mammogram        Vaccination not carried out because of patient refusal          Care Instructions    At [facility], we strive to deliver an exceptional experience to you, every time we see you.  If you receive a survey in the mail, please send us back your thoughts. We really do value your feedback.    Based on your medical history, these are the current health maintenance/preventive care services that you are due for (some may have been done at this visit.)  Health Maintenance Due   Topic Date Due     INFLUENZA VACCINE (SYSTEM ASSIGNED)  09/01/2017     MAMMO SCREEN Q2 YR (SYSTEM ASSIGNED)  12/10/2017         Suggested websites for health information:  Www.First Aid Shot Therapy : Up to date and easily searchable information on multiple topics.  Www.medlineplus.gov : medication info, interactive tutorials, watch real surgeries online  Www.familydoctor.org : good info from the Academy of Family Physicians  Www.cdc.gov : public health info, travel advisories, epidemics (H1N1)  Www.aap.org : children's health info, normal development, vaccinations  Www.health.state.[place].us : [place] dept of health, public health issues in [place], N1N1    Your care team:                            Family Medicine Internal Medicine   MD Colton Brito MD Shantel Branch-Fleming, MD Katya Georgiev PA-C Nam Ho, MD Pediatrics   JANKI Perez, MD Yesenia Raya CNP, MD Deborah Mielke, MD Kim Thein, APRN CNP      Clinic hours: Monday - Thursday 7 am-7 pm; Fridays  "7 am-5 pm.   Urgent care: Monday - Friday 11 am-9 pm; Saturday and  9 am-5 pm.  Pharmacy : Monday -Thursday 8 am-8 pm; Friday 8 am-6 pm; Saturday and  9 am-5 pm.     Clinic: [phone]   Pharmacy: [phone]    Please call [facility] Radiology/[phone] to schedule your mammogram.           Follow-ups after your visit        Additional Services     OPTOMETRY REFERRAL       Abebe Patton, Halima Raymundo et al., optometrists  [place] -- [facility]  [phone]                  Follow-up notes from your care team     Return in about 14 days (around 2018) for recheck if symptoms fail to resolve by then.      Future tests that were ordered for you today     Open Future Orders        Priority Expected Expires Ordered    MA SCREENING DIGITAL BILAT - Future  (s+30) Routine  2019            Who to contact     If you have questions or need follow up information about today's clinic visit or your schedule please contact [facility] directly at [phone].  Normal or non-critical lab and imaging results will be communicated to you by MyChart, letter or phone within 4 business days after the clinic has received the results. If you do not hear from us within 7 days, please contact the clinic through Zomatohart or phone. If you have a critical or abnormal lab result, we will notify you by phone as soon as possible.  Submit refill requests through [employer] Doctor or call your pharmacy and they will forward the refill request to us. Please allow 3 business days for your refill to be completed.          Additional Information About Your Visit        ZomatoharProject Insiders Information     Pain Doctor lets you send messages to your doctor, view your test results, renew your prescriptions, schedule appointments and more. To sign up, go to [email or web address] . Click on \"Log in\" on the left side of the screen, which will take you to the Welcome page. Then " "click on \"Sign up Now\" on the right side of the page.     You will be asked to enter the access code listed below, as well as some personal information. Please follow the directions to create your username and password.     Your access code is: [email or web address]  Expires: 2018  4:29 PM     Your access code will  in 90 days. If you need help or a new code, please call your [place] clinic or [phone].        Care EveryWhere ID     This is your Care EveryWhere ID. This could be used by other organizations to access your [place] medical records  [id number]        Your Vitals Were     Pulse Temperature Height Pulse Oximetry BMI (Body Mass Index)       100 98  F (36.7  C) (Oral) 1.568 m (5' 1.75\") 99% 23.23 kg/m2        Blood Pressure from Last 3 Encounters:   18 133/86   09/15/17 122/72   17 104/68    Weight from Last 3 Encounters:   18 57.2 kg (126 lb)   09/15/17 54 kg (119 lb)   17 54.4 kg (120 lb)              We Performed the Following     OPTOMETRY REFERRAL          Today's Medication Changes          These changes are accurate as of: 18  4:29 PM.  If you have any questions, ask your nurse or doctor.               Start taking these medicines.        Dose/Directions    doxycycline monohydrate 100 MG capsule   Used for:  Acute recurrent maxillary sinusitis   Started by:  Rodney Desai MD        Dose:  100 mg   Take 1 capsule (100 mg) by mouth 2 times daily for 10 days for sinus infection.   Quantity:  20 capsule   Refills:  0            Where to get your medicines      These medications were sent to [facility] [place], [place] - [address]  [address], [facility] [address]     Phone:  [phone]     doxycycline monohydrate 100 MG capsule                Primary Care Provider Office Phone # Fax #    Rodney Desai [phone]       [address]  [place] [address]        Equal Access to Services     [facility] " [facility]AR : Hadii aad norma dennis Falcon, waaxda luqadaha, qaybta kaginnada sarah, waxhamilton teresa haysherlyn ndiayedennyartur morris. So [facility] [phone].    ATENCIÓN: Si habla español, tiene a carney disposición servicios gratuitos de asistencia lingüística. Llame al [phone].    We comply with applicable federal civil rights laws and [place] laws. We do not discriminate on the basis of race, color, national origin, age, disability, sex, sexual orientation, or gender identity.            Thank you!     Thank you for choosing [facility]  for your care. Our goal is always to provide you with excellent care. Hearing back from our patients is one way we can continue to improve our services. Please take a few minutes to complete the written survey that you may receive in the mail after your visit with us. Thank you!             Your Updated Medication List - Protect others around you: Learn how to safely use, store and throw away your medicines at www.disposemymeds.org.          This list is accurate as of: 1/5/18  4:29 PM.  Always use your most recent med list.                   Brand Name Dispense Instructions for use Diagnosis    doxycycline monohydrate 100 MG capsule     20 capsule    Take 1 capsule (100 mg) by mouth 2 times daily for 10 days for sinus infection.    Acute recurrent maxillary sinusitis       fluticasone 50 MCG/ACT spray    FLONASE    1 Bottle    Spray 1-2 sprays into both nostrils daily    Acute recurrent maxillary sinusitis       zolpidem 5 MG tablet    AMBIEN    30 tablet    Take 0.5-1 tablets (2.5-5 mg) by mouth nightly as needed for sleep (take right at bedtime)    Psychophysiological insomnia

## 2018-01-05 NOTE — PROGRESS NOTES
"  SUBJECTIVE:   Paco Medrano is a 57 year old female who presents to clinic today for the following health issues:      ENT Symptoms             Symptoms: cc Present Absent Comment   Fever/Chills  x     Fatigue  x     Muscle Aches  x     Eye Irritation   x    Sneezing   x    Nasal Tyrel/Drg  x     Sinus Pressure/Pain  x  Maxillary pain   Loss of smell  x     Dental pain   x    Sore Throat   x    Swollen Glands   x    Ear Pain/Fullness   x    Cough   x    Wheeze   x    Chest Pain   x    Shortness of breath   x    Rash   x    Other         Symptom duration:  about 2 weeks   Symptom severity:  moderate   Treatments tried:  Advil cold & sinus   Contacts:  family members     She has had several sinus infections in the past and her current symptoms are reminiscent of past infections.    Medications updated and reviewed.  Past, family and surgical history is updated and reviewed in the record.    ROS:    I: Persistent dark circles under her eyes, which she wonders if this is a concern.  EYES: She mentions vision changes (poor night vision, decreased near vision) that have been worsening over the past few months and is interested in an eye exam.  PSYCH: She also notes increased anxiety from the holidays, and continued insomnia, so she has been taking a Benadryl along with her zolpidem.  Other than noted above, general, HEENT, respiratory, cardiac and gastrointestinal systems are negative.    This document serves as a record of the services and decisions personally performed and made by Dr. Desai. It was created on his behalf by Lenore Saldivar, a trained medical scribe. The creation of this document is based the provider's statements to the medical scribe.  Lenore Saldivar January 5, 2018 4:10 PM     OBJECTIVE:                                                    /86 (BP Location: Left arm, Patient Position: Chair, Cuff Size: Adult Regular)  Pulse 100  Temp 98  F (36.7  C) (Oral)  Ht 1.568 m (5' 1.75\")  Wt " 57.2 kg (126 lb)  SpO2 99%  BMI 23.23 kg/m2   Body mass index is 23.23 kg/(m^2).     GENERAL APPEARANCE ADULT: Alert, no acute distress, sniffing frequently  EYES: PERRL, EOM normal, conjunctiva and lids normal  HENT: right TM normal, left TM normal, nose mucosal erythema, mucosal edema, frontal sinus tenderness no, maxillary sinus tenderness bilateral, throat/mouth:normal, mucous membranes moist  RESP: lungs clear to auscultation   CV: normal rate, regular rhythm, no murmur or gallop  MS: extremities normal, no peripheral edema  SKIN: no suspicious lesions or rashes  NEURO: Alert, oriented, speech and mentation normal, Cranial nerves 2-12 are normal.  PSYCH: mentation appears normal., affect and mood normal    Diagnostic Test Results:  none      ASSESSMENT/PLAN:                                                      (J01.01) Acute recurrent maxillary sinusitis  (primary encounter diagnosis)  Comment: Bilateral.  Plan: doxycycline monohydrate 100 MG capsule        Return in about 14 days (around 1/19/2018) for recheck if symptoms fail to resolve by then.     (H53.9) Vision changes  Comment: Symptoms are suggestive of presbyopia. She requests to see an eye doctor.   Plan: OPTOMETRY REFERRAL            (F51.04) Psychophysiological insomnia  Comment: Refill request.   Plan: zolpidem (AMBIEN) 5 MG tablet            (Z12.31) Visit for screening mammogram  Comment:   Plan: MA SCREENING DIGITAL BILAT - Future  (s+30)            (Z28.21) Vaccination not carried out because of patient refusal  Comment: Influenza vaccine offered but declined by the patient.   Plan:        The information in this document, created by the medical scribe for me, accurately reflects the services I personally performed and the decisions made by me. I have reviewed and approved this document for accuracy prior to leaving the patient care area. January 5, 2018 4:10 PM   Rodney Desai MD

## 2018-01-19 DIAGNOSIS — F51.04 PSYCHOPHYSIOLOGICAL INSOMNIA: ICD-10-CM

## 2018-01-19 RX ORDER — ZOLPIDEM TARTRATE 5 MG/1
2.5-5 TABLET ORAL
Qty: 30 TABLET | Refills: 5 | Status: CANCELLED | OUTPATIENT
Start: 2018-01-19

## 2018-01-23 NOTE — TELEPHONE ENCOUNTER
This writer attempted to contact patient on 01/23/18      Reason for call message below and left message to return call.      If patient calls back:   Patient contacted by 1st floor [facility] Team (MA/TC). Inform patient that someone from the team will contact them, document that pt called and route to care team. .        Marcos Brown, CMA

## 2018-01-23 NOTE — TELEPHONE ENCOUNTER
Patient was given local print of Rx.   Verify patient has Rx, or that patient is requesting this refill.  Celsa Coffey RN

## 2018-01-23 NOTE — TELEPHONE ENCOUNTER
..Reason for Call:   call back    Detailed comments: Patient called back said it is ok to leave message.    Phone Number Patient can be reached at: Home number on file [phone] (home)    Best Time: anytime    Can we leave a detailed message on this number? YES    Call taken on 1/23/2018 at 4:41 PM by Quita Mcintosh

## 2018-01-24 NOTE — TELEPHONE ENCOUNTER
This writer attempted to contact Kandyedvin on 01/23/18      Reason for call Medication question and left detailed message.      If patient calls back:   Patient contacted by 1st floor [facility] Team (MA/TC). Inform patient that someone from the team will contact them, document that pt called and route to care team. .        Kevin Ayala

## 2018-01-24 NOTE — TELEPHONE ENCOUNTER
Called and confirmed with our pharmacy tech [facility], patient picked up meds yesterday.  Noe Leonard,  For Teams Comfort and Heart

## 2018-01-30 ENCOUNTER — OFFICE VISIT (OUTPATIENT)
Dept: OPTOMETRY | Facility: CLINIC | Age: 58
End: 2018-01-30
Payer: COMMERCIAL

## 2018-01-30 DIAGNOSIS — H52.4 PRESBYOPIA: ICD-10-CM

## 2018-01-30 DIAGNOSIS — H52.13 MYOPIA OF BOTH EYES: ICD-10-CM

## 2018-01-30 PROCEDURE — 92015 DETERMINE REFRACTIVE STATE: CPT | Performed by: OPTOMETRIST

## 2018-01-30 PROCEDURE — 92004 COMPRE OPH EXAM NEW PT 1/>: CPT | Performed by: OPTOMETRIST

## 2018-01-30 ASSESSMENT — VISUAL ACUITY
METHOD: SNELLEN - LINEAR
OS_SC: 20/40
OD_SC+: -1
OS_SC+: -2
OS_SC: 20/50
OD_SC: 20/40
OD_SC: 20/40

## 2018-01-30 ASSESSMENT — SLIT LAMP EXAM - LIDS
COMMENTS: NORMAL
COMMENTS: NORMAL

## 2018-01-30 ASSESSMENT — REFRACTION_MANIFEST
OD_ADD: +2.25
OS_CYLINDER: SPHERE
OD_CYLINDER: SPHERE
OS_SPHERE: -1.00
OD_SPHERE: -1.00
OS_ADD: +2.25

## 2018-01-30 ASSESSMENT — CONF VISUAL FIELD
OD_NORMAL: 1
OS_NORMAL: 1

## 2018-01-30 ASSESSMENT — TONOMETRY
OS_IOP_MMHG: 21
OD_IOP_MMHG: 19
IOP_METHOD: TONOPEN

## 2018-01-30 ASSESSMENT — CUP TO DISC RATIO
OS_RATIO: 0.2
OD_RATIO: 0.2

## 2018-01-30 ASSESSMENT — EXTERNAL EXAM - RIGHT EYE: OD_EXAM: NORMAL

## 2018-01-30 ASSESSMENT — EXTERNAL EXAM - LEFT EYE: OS_EXAM: NORMAL

## 2018-01-30 NOTE — PATIENT INSTRUCTIONS
Recommend new glasses.  Your options are a bifocal which would allow you to see distance and near vision or separate glasses for distance and reading.  Work space glasses are an option.    Return in 1 year for a complete eye exam or sooner if needed.    Alonzo Patton OD    The affects of the dilating drops last for 4- 6 hours.  You will be more sensitive to light and vision will be blurry up close.  Mydriatic sunglasses were given if needed.      Optometry Providers       Clinic Locations                                 Telephone Number   Dr. Halima Patton   [facility] and [place]  [phone]     [facility] Hours:                [facility] Hours:       [facility] Hours:  [address] [facility] [address]   [address] [facility] [address]      [address] [place] [address]   [place] [address]    [place] [address]  Phone: [phone]                    Phone [phone]                      Phone: [phone]                          Monday 8:00-7:00                          Monday 8:00-7:00                          Monday 8:00-7:00              Tuesday 8:00-6:00                          Tuesday 8:00-7:00                          Tuesday 8:00-7:00              Wednesday 8:00-6:00                  Wednesday 8:00-7:00                   Wednesday 8:00-7:00      Thursday 8:00-6:00                        Thursday 8:00-7:00                         Thursday 8:00-7:00            Friday 8:00-5:00                              Friday 8:00-5:00                              Friday 8:00-5:00    Please log on to [email or web address] to order your contact lenses.  The link is found on the Eye Care and Vision Services page.  As always, Thank you for trusting us with your health care needs!

## 2018-01-30 NOTE — PROGRESS NOTES
Chief Complaint   Patient presents with     COMPREHENSIVE EYE EXAM         Last Eye Exam: 3 years  Dilated Previously: unknown, side effects of dilation explained today,info given to pot     What are you currently using to see?  does not use glasses or contacts,lost glasses x 4 years ago       Distance Vision Acuity: Noticed gradual change in both eyes    Near Vision Acuity: Not satisfied     Eye Comfort: good  Do you use eye drops? : No  Occupation or Hobbies: pharmacy tech    Brenda Perez Optometric Assistant, A.B.O.C.          Medical, surgical and family histories reviewed and updated 1/30/2018.       OBJECTIVE: See Ophthalmology exam    ASSESSMENT:    ICD-10-CM    1. Myopia of both eyes H52.13 EYE EXAM (SIMPLE-NONBILLABLE)     REFRACTION   2. Presbyopia H52.4 EYE EXAM (SIMPLE-NONBILLABLE)     REFRACTION      PLAN:     Patient Instructions   Recommend new glasses.  Your options are a bifocal which would allow you to see distance and near vision or separate glasses for distance and reading.  Work space glasses are an option.    Return in 1 year for a complete eye exam or sooner if needed.    Alonzo Patton, OD

## 2018-01-30 NOTE — LETTER
1/30/2018         RE: Paco Medrano  [address]  [facility] [address]        Dear Colleague,    Thank you for referring your patient, Paco Medrano, to the [facility]. Please see a copy of my visit note below.    Chief Complaint   Patient presents with     COMPREHENSIVE EYE EXAM         Last Eye Exam: 3 years  Dilated Previously: unknown, side effects of dilation explained today,info given to pot     What are you currently using to see?  does not use glasses or contacts,lost glasses x 4 years ago       Distance Vision Acuity: Noticed gradual change in both eyes    Near Vision Acuity: Not satisfied     Eye Comfort: good  Do you use eye drops? : No  Occupation or Hobbies: pharmacy tech    Brenda Perez Optometric Assistant, A.B.O.C.          Medical, surgical and family histories reviewed and updated 1/30/2018.       OBJECTIVE: See Ophthalmology exam    ASSESSMENT:    ICD-10-CM    1. Myopia of both eyes H52.13 EYE EXAM (SIMPLE-NONBILLABLE)     REFRACTION   2. Presbyopia H52.4 EYE EXAM (SIMPLE-NONBILLABLE)     REFRACTION      PLAN:     Patient Instructions   Recommend new glasses.  Your options are a bifocal which would allow you to see distance and near vision or separate glasses for distance and reading.  Work space glasses are an option.    Return in 1 year for a complete eye exam or sooner if needed.    Alonzo Patton OD           Again, thank you for allowing me to participate in the care of your patient.        Sincerely,        Alonzo Patton, OD

## 2018-01-30 NOTE — MR AVS SNAPSHOT
After Visit Summary   1/30/2018    Paco Medrano    MRN: [id number]           Patient Information     Date Of Birth          1960        Visit Information        Provider Department      1/30/2018 3:00 PM Alonzo Patton OD [facility]        Today's Diagnoses     Myopia of both eyes        Presbyopia          Care Instructions    Recommend new glasses.  Your options are a bifocal which would allow you to see distance and near vision or separate glasses for distance and reading.  Work space glasses are an option.    Return in 1 year for a complete eye exam or sooner if needed.    Alonzo Patton, MARCELLO    The affects of the dilating drops last for 4- 6 hours.  You will be more sensitive to light and vision will be blurry up close.  Mydriatic sunglasses were given if needed.      Optometry Providers       Clinic Locations                                 Telephone Number   Dr. Halima Patton   [facility] and [place]  [phone]     [facility] Hours:                [facility] Hours:       [facility] Hours:  [address] [facility] [address]   [address] [facility]     [address] [place] [address]   [place] [address]    [place] [address]  Phone: [phone]                    Phone [phone]                      Phone: [phone]                          Monday 8:00-7:00                          Monday 8:00-7:00                          Monday 8:00-7:00              Tuesday 8:00-6:00                          Tuesday 8:00-7:00                          Tuesday 8:00-7:00              Wednesday 8:00-6:00                  Wednesday 8:00-7:00                   Wednesday 8:00-7:00      Thursday 8:00-6:00                        Thursday 8:00-7:00                         Thursday 8:00-7:00            Friday 8:00-5:00                              Friday 8:00-5:00                 "              Friday 8:00-5:00    Please log on to [email or web address] to order your contact lenses.  The link is found on the Eye Care and Vision Services page.  As always, Thank you for trusting us with your health care needs!              Follow-ups after your visit        Follow-up notes from your care team     Return in about 1 year (around 2019) for Annual Visit.      Who to contact     If you have questions or need follow up information about today's clinic visit or your schedule please contact [facility] directly at [phone].  Normal or non-critical lab and imaging results will be communicated to you by in3Dgalleryhart, letter or phone within 4 business days after the clinic has received the results. If you do not hear from us within 7 days, please contact the clinic through in3Dgalleryhart or phone. If you have a critical or abnormal lab result, we will notify you by phone as soon as possible.  Submit refill requests through Huddlebuy or call your pharmacy and they will forward the refill request to us. Please allow 3 business days for your refill to be completed.          Additional Information About Your Visit        MyChart Information     [employer] lets you send messages to your doctor, view your test results, renew your prescriptions, schedule appointments and more. To sign up, go to [email or web address] . Click on \"Log in\" on the left side of the screen, which will take you to the Welcome page. Then click on \"Sign up Now\" on the right side of the page.     You will be asked to enter the access code listed below, as well as some personal information. Please follow the directions to create your username and password.     Your access code is: [email or web address]  Expires: 2018  4:29 PM     Your access code will  in 90 days. If you need help or a new code, please call your [facility] clinic or [phone].        Care EveryWhere ID     This is your Care EveryWhere ID. This could be used by other " organizations to access your [facility] medical records  [id number]         Blood Pressure from Last 3 Encounters:   01/05/18 133/86   09/15/17 122/72   06/17/17 104/68    Weight from Last 3 Encounters:   01/05/18 57.2 kg (126 lb)   09/15/17 54 kg (119 lb)   06/17/17 54.4 kg (120 lb)              We Performed the Following     EYE EXAM (SIMPLE-NONBILLABLE)     REFRACTION        Primary Care Provider Office Phone # Fax #    Rodney Desai [phone]       [address]  [facility] [address]        Equal Access to Services     [facility] : Hadii aad ku hadasho Soomaali, waaxda luqadaha, qaybta kaalmada adeegyada, waxay idiin hayaan daniel ibarra . So [facility] [phone].    ATENCIÓN: Si habla español, tiene a carney disposición servicios gratuitos de asistencia lingüística. Llame al [phone].    We comply with applicable federal civil rights laws and [place] laws. We do not discriminate on the basis of race, color, national origin, age, disability, sex, sexual orientation, or gender identity.            Thank you!     Thank you for choosing [facility]  for your care. Our goal is always to provide you with excellent care. Hearing back from our patients is one way we can continue to improve our services. Please take a few minutes to complete the written survey that you may receive in the mail after your visit with us. Thank you!             Your Updated Medication List - Protect others around you: Learn how to safely use, store and throw away your medicines at www.disposemymeds.org.          This list is accurate as of 1/30/18  4:11 PM.  Always use your most recent med list.                   Brand Name Dispense Instructions for use Diagnosis    fluticasone 50 MCG/ACT spray    FLONASE    1 Bottle    Spray 1-2 sprays into both nostrils daily    Acute recurrent maxillary sinusitis       Pseudoephedrine-Ibuprofen  MG Tabs           zolpidem 5 MG tablet    AMBIEN     30 tablet    Take 0.5-1 tablets (2.5-5 mg) by mouth nightly as needed for sleep (take right at bedtime)    Psychophysiological insomnia

## 2018-02-02 ENCOUNTER — TELEPHONE (OUTPATIENT)
Dept: FAMILY MEDICINE | Facility: CLINIC | Age: 58
End: 2018-02-02

## 2018-02-02 NOTE — TELEPHONE ENCOUNTER
Left detailed message of the need to be seen in clinic for reassessment for further treatment of sinus infection.      Dede [place] RN, [facility]

## 2018-02-02 NOTE — TELEPHONE ENCOUNTER
Reason for call:  Patient reporting a symptom    Symptom or request: Ongoing sinus infection     Duration (how long have symptoms been present): 2-3 weeks     Have you been treated for this before? Yes    Additional comments: patient would like another refill for Doxycycline due to her sinus is not getting better. Please call to advise. Thanks.     Phone Number patient can be reached at:  Home number on file [phone] (home)    Best Time:  Anytime     Can we leave a detailed message on this number:  YES    Call taken on 2/2/2018 at 2:44 PM by Ryan Barba

## 2018-03-16 ENCOUNTER — OFFICE VISIT (OUTPATIENT)
Dept: URGENT CARE | Facility: URGENT CARE | Age: 58
End: 2018-03-16
Payer: COMMERCIAL

## 2018-03-16 VITALS
HEART RATE: 98 BPM | OXYGEN SATURATION: 96 % | TEMPERATURE: 97.1 F | WEIGHT: 127.2 LBS | DIASTOLIC BLOOD PRESSURE: 79 MMHG | SYSTOLIC BLOOD PRESSURE: 116 MMHG | BODY MASS INDEX: 23.45 KG/M2

## 2018-03-16 DIAGNOSIS — R39.15 URINARY URGENCY: Primary | ICD-10-CM

## 2018-03-16 DIAGNOSIS — R10.9 FLANK PAIN: ICD-10-CM

## 2018-03-16 DIAGNOSIS — N89.8 VAGINAL DISCHARGE: ICD-10-CM

## 2018-03-16 DIAGNOSIS — R31.29 MICROSCOPIC HEMATURIA: ICD-10-CM

## 2018-03-16 LAB
ALBUMIN UR-MCNC: NEGATIVE MG/DL
AMORPH CRY #/AREA URNS HPF: ABNORMAL /HPF
APPEARANCE UR: ABNORMAL
BILIRUB UR QL STRIP: NEGATIVE
COLOR UR AUTO: YELLOW
GLUCOSE UR STRIP-MCNC: NEGATIVE MG/DL
HGB UR QL STRIP: ABNORMAL
KETONES UR STRIP-MCNC: NEGATIVE MG/DL
LEUKOCYTE ESTERASE UR QL STRIP: NEGATIVE
NITRATE UR QL: NEGATIVE
NON-SQ EPI CELLS #/AREA URNS LPF: ABNORMAL /LPF
PH UR STRIP: 6 PH (ref 5–7)
RBC #/AREA URNS AUTO: ABNORMAL /HPF
SOURCE: ABNORMAL
SP GR UR STRIP: 1.02 (ref 1–1.03)
SPECIMEN SOURCE: NORMAL
UROBILINOGEN UR STRIP-ACNC: 0.2 EU/DL (ref 0.2–1)
WBC #/AREA URNS AUTO: ABNORMAL /HPF
WET PREP SPEC: NORMAL

## 2018-03-16 PROCEDURE — 99213 OFFICE O/P EST LOW 20 MIN: CPT | Performed by: NURSE PRACTITIONER

## 2018-03-16 PROCEDURE — 81001 URINALYSIS AUTO W/SCOPE: CPT | Performed by: NURSE PRACTITIONER

## 2018-03-16 PROCEDURE — 87210 SMEAR WET MOUNT SALINE/INK: CPT | Performed by: NURSE PRACTITIONER

## 2018-03-16 RX ORDER — HYDROCODONE BITARTRATE AND ACETAMINOPHEN 5; 325 MG/1; MG/1
1 TABLET ORAL EVERY 6 HOURS PRN
Qty: 16 TABLET | Refills: 0 | Status: SHIPPED | OUTPATIENT
Start: 2018-03-16 | End: 2018-03-27

## 2018-03-16 ASSESSMENT — ENCOUNTER SYMPTOMS
CHILLS: 0
BACK PAIN: 1
DYSURIA: 1
SORE THROAT: 0
NAUSEA: 0
FLANK PAIN: 1
DIARRHEA: 0
HEADACHES: 0
VOMITING: 0
SHORTNESS OF BREATH: 0
RHINORRHEA: 0
COUGH: 0
FEVER: 0

## 2018-03-16 NOTE — MR AVS SNAPSHOT
After Visit Summary   3/16/2018    Paco Medrano    MRN: [id number]           Patient Information     Date Of Birth          1960        Visit Information        Provider Department      3/16/2018 6:00 PM Jocelynn Joaquin NP [facility]        Today's Diagnoses     Urinary urgency    -  1    Vaginal discharge        Microscopic hematuria        Flank pain          Care Instructions      Kidney Stone (Urine)  Does this test have other names?  Urine stone risk profile, 24-hour collection  What is this test?  This test checks your urine for chemicals that might cause your body to form kidney stones. The test also looks for blood in your urine, which can be a symptom of kidney stones.  Kidney stones are hard masses of minerals and salts that can form in your kidneys. They can be as small as a grain of sand or more than an inch in diameter. Usually theses stones or crystals pass through your body when you urinate. But sometimes they can get stuck in your urinary tract and cause a lot of pain.  Why do I need this test?  You may need this test if your healthcare provider suspects that you have kidney stones. Symptoms of kidney stones include:    Pain in your lower belly (abdomen) or side    Nausea and vomiting    Sudden, strong urge to urinate    Pain when urinating    Blood in your urine  You may also have this test if you had a kidney stone or you are being treated for kidney stones. If you have had a kidney stone or any treatments for a kidney stone, you should wait 1 to 2 months, or until you have completely recovered, before having this test.  You will need to repeat the test at least twice so your healthcare provider can compare the results.  What other tests might I have along with this test?  Your healthcare provider may also order imaging tests. These include an ultrasound, CT scan and, a special type of X-ray (pyelogram) that uses a dye to look for kidney stones.  Your  provider is also likely to order blood tests, to look for calcium, phosphate, uric acid, oxalate, and citrate. These are some of the chemicals that are most likely to cause your body to form kidney stones.  What do my test results mean?  Many things may affect your lab test results. These include the method each lab uses to do the test. Even if your test results are different from the normal value, you may not have a problem. To learn what the results mean for you, talk with your healthcare provider.  The results will show whether your urine has high or low levels of the chemicals that are most likely to cause stones to form. These chemicals are calcium, phosphate, uric acid, oxalate, and citrate.  If your levels are not normal, it may mean that you have a kidney stone or stones.  Abnormal levels may also mean that you have another kidney disorder, such as a urinary tract infection.  How is this test done?  This test requires a 24-hour urine sample. For this sample, you must collect all of your urine for 24 hours. Empty your bladder completely first in the morning without collecting it and note the time. Then collect your urine every time you go to the bathroom over the next 24 hours. As you collect the urine, store it in the refrigerator.  Does this test pose any risks?  This test does not pose any known risks.  What might affect my test results?  Having this test too soon after treatment for a previous kidney stone can affect your results. You should wait several months after treatment before having this test.  How do I get ready for this test?  You don't need to prepare for this test.      7893-0253 [employer]. [address] [place] [address], [place] [address]. All rights reserved. This information is not intended as a substitute for professional medical care. Always follow your healthcare professional's instructions.                Follow-ups after your visit        Who to contact     If you have  "questions or need follow up information about today's clinic visit or your schedule please contact [facility] directly at [phone].  Normal or non-critical lab and imaging results will be communicated to you by MyChart, letter or phone within 4 business days after the clinic has received the results. If you do not hear from us within 7 days, please contact the clinic through CopyRightNowhart or phone. If you have a critical or abnormal lab result, we will notify you by phone as soon as possible.  Submit refill requests through Stayhound or call your pharmacy and they will forward the refill request to us. Please allow 3 business days for your refill to be completed.          Additional Information About Your Visit        [employer] Information     Stayhound lets you send messages to your doctor, view your test results, renew your prescriptions, schedule appointments and more. To sign up, go to [email or web address] . Click on \"Log in\" on the left side of the screen, which will take you to the Welcome page. Then click on \"Sign up Now\" on the right side of the page.     You will be asked to enter the access code listed below, as well as some personal information. Please follow the directions to create your username and password.     Your access code is: [email or web address]  Expires: 2018  5:29 PM     Your access code will  in 90 days. If you need help or a new code, please call your [facility] clinic or [phone].        Care EveryWhere ID     This is your Care EveryWhere ID. This could be used by other organizations to access your [facility] medical records  [id number]        Your Vitals Were     Pulse Temperature Pulse Oximetry BMI (Body Mass Index)          98 97.1  F (36.2  C) (Oral) 96% 23.45 kg/m2         Blood Pressure from Last 3 Encounters:   18 116/79   18 133/86   09/15/17 122/72    Weight from Last 3 Encounters:   18 127 lb 3.2 oz (57.7 kg)   18 126 lb (57.2 kg) "   09/15/17 119 lb (54 kg)              We Performed the Following     *UA reflex to Microscopic and Culture ([place] and [facility] (except [place] and [place])     Urine Microscopic     Wet prep          Today's Medication Changes          These changes are accurate as of 3/16/18  7:03 PM.  If you have any questions, ask your nurse or doctor.               Start taking these medicines.        Dose/Directions    HYDROcodone-acetaminophen 5-325 MG per tablet   Commonly known as:  NORCO   Used for:  Flank pain   Started by:  Jocelynn Joaquin NP        Dose:  1 tablet   Take 1 tablet by mouth every 6 hours as needed for pain maximum 4 tablet(s) per day   Quantity:  16 tablet   Refills:  0            Where to get your medicines      Some of these will need a paper prescription and others can be bought over the counter.  Ask your nurse if you have questions.     Bring a paper prescription for each of these medications     HYDROcodone-acetaminophen 5-325 MG per tablet                Primary Care Provider Office Phone # Fax #    Rodney Desai [phone]       [address]  [facility] [address]        Equal Access to Services     [facility]: Hadii aad ku hadasho Soomaali, waaxda luqadaha, qaybta kaalmada adeegyada, waxay teresa haysherlyn ibarra . So [facility] [phone].    ATENCIÓN: Si habla español, tiene a carney disposición servicios gratuitos de asistencia lingüística. Llame al [phone].    We comply with applicable federal civil rights laws and [place] laws. We do not discriminate on the basis of race, color, national origin, age, disability, sex, sexual orientation, or gender identity.            Thank you!     Thank you for choosing [facility]  for your care. Our goal is always to provide you with excellent care. Hearing back from our patients is one way we can continue to improve our services. Please take a few minutes to complete the written survey  that you may receive in the mail after your visit with us. Thank you!             Your Updated Medication List - Protect others around you: Learn how to safely use, store and throw away your medicines at www.disposemymeds.org.          This list is accurate as of 3/16/18  7:03 PM.  Always use your most recent med list.                   Brand Name Dispense Instructions for use Diagnosis    fluticasone 50 MCG/ACT spray    FLONASE    1 Bottle    Spray 1-2 sprays into both nostrils daily    Acute recurrent maxillary sinusitis       HYDROcodone-acetaminophen 5-325 MG per tablet    NORCO    16 tablet    Take 1 tablet by mouth every 6 hours as needed for pain maximum 4 tablet(s) per day    Flank pain       Pseudoephedrine-Ibuprofen  MG Tabs           zolpidem 5 MG tablet    AMBIEN    30 tablet    Take 0.5-1 tablets (2.5-5 mg) by mouth nightly as needed for sleep (take right at bedtime)    Psychophysiological insomnia

## 2018-03-16 NOTE — PROGRESS NOTES
SUBJECTIVE:   Paco Medrano is a 58 year old female presenting with a chief complaint of   Chief Complaint   Patient presents with     Back Pain     Patient complains of lower back pain and chills       She is an established patient of [facility].    Onset of symptoms was 2 day(s) ago.  Course of illness is worsening.    Severity moderate  Current and Associated symptoms: lower back and flank pain, dysuria  Treatment measures tried include None tried.  Predisposing factors include None.        Review of Systems   Constitutional: Negative for chills and fever.   HENT: Negative for congestion, ear pain, rhinorrhea and sore throat.    Respiratory: Negative for cough and shortness of breath.    Gastrointestinal: Negative for diarrhea, nausea and vomiting.   Genitourinary: Positive for dysuria and flank pain.   Musculoskeletal: Positive for back pain.   Neurological: Negative for headaches.       No past medical history on file.  Family History   Problem Relation Age of Onset     Glaucoma Mother      Hypertension Mother      Hypertension Father      CANCER Sister      DIABETES Daughter      Current Outpatient Prescriptions   Medication Sig Dispense Refill     HYDROcodone-acetaminophen (NORCO) 5-325 MG per tablet Take 1 tablet by mouth every 6 hours as needed for pain maximum 4 tablet(s) per day 16 tablet 0     Pseudoephedrine-Ibuprofen  MG TABS        zolpidem (AMBIEN) 5 MG tablet Take 0.5-1 tablets (2.5-5 mg) by mouth nightly as needed for sleep (take right at bedtime) 30 tablet 5     fluticasone (FLONASE) 50 MCG/ACT spray Spray 1-2 sprays into both nostrils daily (Patient not taking: Reported on 3/16/2018) 1 Bottle 0     Social History   Substance Use Topics     Smoking status: Current Every Day Smoker     Start date: 1/9/2012     Smokeless tobacco: Never Used     Alcohol use 0.0 oz/week     0 Standard drinks or equivalent per week       OBJECTIVE  /79 (BP Location: Left arm, Patient Position: Chair, Cuff  Size: Adult Regular)  Pulse 98  Temp 97.1  F (36.2  C) (Oral)  Wt 127 lb 3.2 oz (57.7 kg)  SpO2 96%  BMI 23.45 kg/m2    Physical Exam   Constitutional: She appears well-developed and well-nourished. No distress.   HENT:   Head: Normocephalic and atraumatic.   Right Ear: Tympanic membrane and external ear normal.   Left Ear: Tympanic membrane and external ear normal.   Mouth/Throat: Oropharynx is clear and moist.   Eyes: EOM are normal. Pupils are equal, round, and reactive to light.   Neck: Normal range of motion. Neck supple.   Pulmonary/Chest: Effort normal and breath sounds normal. No respiratory distress.   Lymphadenopathy:     She has no cervical adenopathy.   Neurological: She is alert. No cranial nerve deficit.   Skin: Skin is warm and dry. She is not diaphoretic.   Psychiatric: She has a normal mood and affect.   Nursing note and vitals reviewed.      Labs:  Results for orders placed or performed in visit on 03/16/18 (from the past 24 hour(s))   *UA reflex to Microscopic and Culture ([facility] (except [place] and [place])   Result Value Ref Range    Color Urine Yellow     Appearance Urine Slightly Cloudy     Glucose Urine Negative NEG^Negative mg/dL    Bilirubin Urine Negative NEG^Negative    Ketones Urine Negative NEG^Negative mg/dL    Specific Gravity Urine 1.025 1.003 - 1.035    Blood Urine Moderate (A) NEG^Negative    pH Urine 6.0 5.0 - 7.0 pH    Protein Albumin Urine Negative NEG^Negative mg/dL    Urobilinogen Urine 0.2 0.2 - 1.0 EU/dL    Nitrite Urine Negative NEG^Negative    Leukocyte Esterase Urine Negative NEG^Negative    Source Midstream Urine    Urine Microscopic   Result Value Ref Range    WBC Urine 0 - 5 OTO5^0 - 5 /HPF    RBC Urine 10-25 (A) OTO2^O - 2 /HPF    Squamous Epithelial /LPF Urine Few FEW^Few /LPF    Amorphous Crystals Few (A) NEG^Negative /HPF   Wet prep   Result Value Ref Range    Specimen Description Vagina     Wet Prep No Trichomonas seen     Wet Prep No  clue cells seen     Wet Prep No yeast seen     Wet Prep (Note)  MANY BACTERIA SEEN.  SELF COLLECT.            ASSESSMENT:      ICD-10-CM    1. Urinary urgency R39.15 *UA reflex to Microscopic and Culture ([facility] (except [place] and [place])     Urine Microscopic   2. Vaginal discharge N89.8 Wet prep   3. Microscopic hematuria R31.29    4. Flank pain R10.9 HYDROcodone-acetaminophen (NORCO) 5-325 MG per tablet        Medical Decision Making:    Differential Diagnosis:  UTI and Kidney Stone    Serious Comorbid Conditions:  Adult:  None    PLAN:  I discussed the possibility of kidney stones because of microscopic hematuria. I advised Loreese to increase hydration and if pain is getting worse then it's best to go to the ER.     Followup:    If not improving or if condition worsens, follow up with your Primary Care Provider, If not improving or if conditions worsens over the next 12-24 hours, go to the Emergency Department    Patient Instructions     Kidney Stone (Urine)  Does this test have other names?  Urine stone risk profile, 24-hour collection  What is this test?  This test checks your urine for chemicals that might cause your body to form kidney stones. The test also looks for blood in your urine, which can be a symptom of kidney stones.  Kidney stones are hard masses of minerals and salts that can form in your kidneys. They can be as small as a grain of sand or more than an inch in diameter. Usually theses stones or crystals pass through your body when you urinate. But sometimes they can get stuck in your urinary tract and cause a lot of pain.  Why do I need this test?  You may need this test if your healthcare provider suspects that you have kidney stones. Symptoms of kidney stones include:    Pain in your lower belly (abdomen) or side    Nausea and vomiting    Sudden, strong urge to urinate    Pain when urinating    Blood in your urine  You may also have this test if you had a kidney  stone or you are being treated for kidney stones. If you have had a kidney stone or any treatments for a kidney stone, you should wait 1 to 2 months, or until you have completely recovered, before having this test.  You will need to repeat the test at least twice so your healthcare provider can compare the results.  What other tests might I have along with this test?  Your healthcare provider may also order imaging tests. These include an ultrasound, CT scan and, a special type of X-ray (pyelogram) that uses a dye to look for kidney stones.  Your provider is also likely to order blood tests, to look for calcium, phosphate, uric acid, oxalate, and citrate. These are some of the chemicals that are most likely to cause your body to form kidney stones.  What do my test results mean?  Many things may affect your lab test results. These include the method each lab uses to do the test. Even if your test results are different from the normal value, you may not have a problem. To learn what the results mean for you, talk with your healthcare provider.  The results will show whether your urine has high or low levels of the chemicals that are most likely to cause stones to form. These chemicals are calcium, phosphate, uric acid, oxalate, and citrate.  If your levels are not normal, it may mean that you have a kidney stone or stones.  Abnormal levels may also mean that you have another kidney disorder, such as a urinary tract infection.  How is this test done?  This test requires a 24-hour urine sample. For this sample, you must collect all of your urine for 24 hours. Empty your bladder completely first in the morning without collecting it and note the time. Then collect your urine every time you go to the bathroom over the next 24 hours. As you collect the urine, store it in the refrigerator.  Does this test pose any risks?  This test does not pose any known risks.  What might affect my test results?  Having this test too soon  after treatment for a previous kidney stone can affect your results. You should wait several months after treatment before having this test.  How do I get ready for this test?  You don't need to prepare for this test.      6536-1049 [employer]. [address] [place] [address], [place] [address]. All rights reserved. This information is not intended as a substitute for professional medical care. Always follow your healthcare professional's instructions.

## 2018-03-16 NOTE — NURSING NOTE
"Chief Complaint   Patient presents with     Back Pain     Patient complains of lower back pain and chills       Initial /79 (BP Location: Left arm, Patient Position: Chair, Cuff Size: Adult Regular)  Pulse 98  Temp 97.1  F (36.2  C) (Oral)  Wt 127 lb 3.2 oz (57.7 kg)  SpO2 96%  BMI 23.45 kg/m2 Estimated body mass index is 23.45 kg/(m^2) as calculated from the following:    Height as of 1/5/18: 5' 1.75\" (1.568 m).    Weight as of this encounter: 127 lb 3.2 oz (57.7 kg).  Medication Reconciliation: complete       Cris Alvarenga    "

## 2018-03-17 NOTE — PATIENT INSTRUCTIONS
Kidney Stone (Urine)  Does this test have other names?  Urine stone risk profile, 24-hour collection  What is this test?  This test checks your urine for chemicals that might cause your body to form kidney stones. The test also looks for blood in your urine, which can be a symptom of kidney stones.  Kidney stones are hard masses of minerals and salts that can form in your kidneys. They can be as small as a grain of sand or more than an inch in diameter. Usually theses stones or crystals pass through your body when you urinate. But sometimes they can get stuck in your urinary tract and cause a lot of pain.  Why do I need this test?  You may need this test if your healthcare provider suspects that you have kidney stones. Symptoms of kidney stones include:    Pain in your lower belly (abdomen) or side    Nausea and vomiting    Sudden, strong urge to urinate    Pain when urinating    Blood in your urine  You may also have this test if you had a kidney stone or you are being treated for kidney stones. If you have had a kidney stone or any treatments for a kidney stone, you should wait 1 to 2 months, or until you have completely recovered, before having this test.  You will need to repeat the test at least twice so your healthcare provider can compare the results.  What other tests might I have along with this test?  Your healthcare provider may also order imaging tests. These include an ultrasound, CT scan and, a special type of X-ray (pyelogram) that uses a dye to look for kidney stones.  Your provider is also likely to order blood tests, to look for calcium, phosphate, uric acid, oxalate, and citrate. These are some of the chemicals that are most likely to cause your body to form kidney stones.  What do my test results mean?  Many things may affect your lab test results. These include the method each lab uses to do the test. Even if your test results are different from the normal value, you may not have a problem. To  learn what the results mean for you, talk with your healthcare provider.  The results will show whether your urine has high or low levels of the chemicals that are most likely to cause stones to form. These chemicals are calcium, phosphate, uric acid, oxalate, and citrate.  If your levels are not normal, it may mean that you have a kidney stone or stones.  Abnormal levels may also mean that you have another kidney disorder, such as a urinary tract infection.  How is this test done?  This test requires a 24-hour urine sample. For this sample, you must collect all of your urine for 24 hours. Empty your bladder completely first in the morning without collecting it and note the time. Then collect your urine every time you go to the bathroom over the next 24 hours. As you collect the urine, store it in the refrigerator.  Does this test pose any risks?  This test does not pose any known risks.  What might affect my test results?  Having this test too soon after treatment for a previous kidney stone can affect your results. You should wait several months after treatment before having this test.  How do I get ready for this test?  You don't need to prepare for this test.      6884-4424 [employer]. [address] [place] [address], [place] [address]. All rights reserved. This information is not intended as a substitute for professional medical care. Always follow your healthcare professional's instructions.

## 2018-03-27 ENCOUNTER — OFFICE VISIT (OUTPATIENT)
Dept: FAMILY MEDICINE | Facility: CLINIC | Age: 58
End: 2018-03-27
Payer: COMMERCIAL

## 2018-03-27 VITALS
HEIGHT: 62 IN | OXYGEN SATURATION: 100 % | WEIGHT: 125 LBS | BODY MASS INDEX: 23 KG/M2 | HEART RATE: 84 BPM | DIASTOLIC BLOOD PRESSURE: 62 MMHG | TEMPERATURE: 98.7 F | SYSTOLIC BLOOD PRESSURE: 123 MMHG

## 2018-03-27 DIAGNOSIS — Z12.31 ENCOUNTER FOR SCREENING MAMMOGRAM FOR BREAST CANCER: ICD-10-CM

## 2018-03-27 DIAGNOSIS — R10.9 FLANK PAIN: Primary | ICD-10-CM

## 2018-03-27 DIAGNOSIS — R07.0 THROAT PAIN: ICD-10-CM

## 2018-03-27 LAB
ALBUMIN UR-MCNC: NEGATIVE MG/DL
APPEARANCE UR: CLEAR
BACTERIA #/AREA URNS HPF: ABNORMAL /HPF
BILIRUB UR QL STRIP: NEGATIVE
COLOR UR AUTO: YELLOW
DEPRECATED S PYO AG THROAT QL EIA: NORMAL
GLUCOSE UR STRIP-MCNC: NEGATIVE MG/DL
HGB UR QL STRIP: ABNORMAL
KETONES UR STRIP-MCNC: NEGATIVE MG/DL
LEUKOCYTE ESTERASE UR QL STRIP: NEGATIVE
NITRATE UR QL: NEGATIVE
NON-SQ EPI CELLS #/AREA URNS LPF: ABNORMAL /LPF
PH UR STRIP: 5.5 PH (ref 5–7)
RBC #/AREA URNS AUTO: ABNORMAL /HPF
SOURCE: ABNORMAL
SP GR UR STRIP: 1.02 (ref 1–1.03)
SPECIMEN SOURCE: NORMAL
UROBILINOGEN UR STRIP-ACNC: 0.2 EU/DL (ref 0.2–1)
WBC #/AREA URNS AUTO: ABNORMAL /HPF

## 2018-03-27 PROCEDURE — 99214 OFFICE O/P EST MOD 30 MIN: CPT | Performed by: FAMILY MEDICINE

## 2018-03-27 PROCEDURE — 87081 CULTURE SCREEN ONLY: CPT | Performed by: FAMILY MEDICINE

## 2018-03-27 PROCEDURE — 81001 URINALYSIS AUTO W/SCOPE: CPT | Performed by: FAMILY MEDICINE

## 2018-03-27 PROCEDURE — 87086 URINE CULTURE/COLONY COUNT: CPT | Performed by: FAMILY MEDICINE

## 2018-03-27 PROCEDURE — 87880 STREP A ASSAY W/OPTIC: CPT | Performed by: FAMILY MEDICINE

## 2018-03-27 RX ORDER — HYDROCODONE BITARTRATE AND ACETAMINOPHEN 5; 325 MG/1; MG/1
1 TABLET ORAL EVERY 6 HOURS PRN
Qty: 16 TABLET | Refills: 0 | Status: SHIPPED | OUTPATIENT
Start: 2018-03-27 | End: 2018-07-17

## 2018-03-27 ASSESSMENT — PAIN SCALES - GENERAL: PAINLEVEL: WORST PAIN (10)

## 2018-03-27 NOTE — LETTER
March 29, 2018      Paco Medrano  [address] [place] [address]        Ms. Medrano,    Your throat and urine cultures were negative.    Please contact the clinic if you have additional questions.  Thank you.    Sincerely,      Rodney Desai MD/sonja    Resulted Orders   UA reflex to Microscopic and Culture   Result Value Ref Range    Color Urine Yellow     Appearance Urine Clear     Glucose Urine Negative NEG^Negative mg/dL    Bilirubin Urine Negative NEG^Negative    Ketones Urine Negative NEG^Negative mg/dL    Specific Gravity Urine 1.025 1.003 - 1.035    Blood Urine Moderate (A) NEG^Negative    pH Urine 5.5 5.0 - 7.0 pH    Protein Albumin Urine Negative NEG^Negative mg/dL    Urobilinogen Urine 0.2 0.2 - 1.0 EU/dL    Nitrite Urine Negative NEG^Negative    Leukocyte Esterase Urine Negative NEG^Negative    Source Midstream Urine    Rapid strep screen   Result Value Ref Range    Specimen Description Throat     Rapid Strep A Screen       NEGATIVE: No Group A streptococcal antigen detected by immunoassay, await culture report.   Urine Microscopic   Result Value Ref Range    WBC Urine 0 - 5 OTO5^0 - 5 /HPF    RBC Urine 2-5 (A) OTO2^O - 2 /HPF    Squamous Epithelial /LPF Urine Few FEW^Few /LPF    Bacteria Urine Moderate (A) NEG^Negative /HPF   Urine Culture Aerobic Bacterial   Result Value Ref Range    Specimen Description Midstream Urine     Culture Micro <10,000 colonies/mL  urogenital eric      Beta strep group A culture   Result Value Ref Range    Specimen Description Throat     Culture Micro No beta hemolytic Streptococcus Group A isolated

## 2018-03-27 NOTE — MR AVS SNAPSHOT
After Visit Summary   3/27/2018    Paco Medrano    MRN: [id number]           Patient Information     Date Of Birth          1960        Visit Information        Provider Department      3/27/2018 2:20 PM Rodney Desai MD [facility]        Today's Diagnoses     Flank pain    -  1    Throat pain        Encounter for screening mammogram for breast cancer          Care Instructions    At [facility], we strive to deliver an exceptional experience to you, every time we see you.  If you receive a survey in the mail, please send us back your thoughts. We really do value your feedback.    Based on your medical history, these are the current health maintenance/preventive care services that you are due for (some may have been done at this visit.)  Health Maintenance Due   Topic Date Due     MAMMO SCREEN Q2 YR (SYSTEM ASSIGNED)  12/10/2017         Suggested websites for health information:  Www.[employer]walk : Up to date and easily searchable information on multiple topics.  Www.[employer].gov : medication info, interactive tutorials, watch real surgeries online  Www.familydoctor.org : good info from the Academy of Family Physicians  Www.cdc.gov : public health info, travel advisories, epidemics (H1N1)  Www.aap.org : children's health info, normal development, vaccinations  Www.health.state[place].us : [place] dept of health, public health issues in [place], N1N1    Your care team:                            Family Medicine Internal Medicine   MD Colton Brito MD Shantel Branch-Fleming, MD Katya Georgiev PA-C Nam Ho, MD Pediatrics   JANKI Perez, GUALBERTO Aviles APRN MD Yesenia Piper MD Deborah Mielke, MD Kim Thein, APRN CNP      Clinic hours: Monday - Thursday 7 am-7 pm; Fridays 7 am-5 pm.   Urgent care: Monday - Friday 11 am-9 pm; Saturday and Sunday 9 am-5 pm.  Pharmacy : Monday -Thursday 8  "am-8 pm; Friday 8 am-6 pm; Saturday and  9 am-5 pm.     Clinic: [phone]   Pharmacy: [phone]      Please call the [facility]  [phone] to schedule your abdominal and pelvic CT and mammogram.           Follow-ups after your visit        Future tests that were ordered for you today     Open Future Orders        Priority Expected Expires Ordered    CT Abdomen Pelvis w/o Contrast STAT 3/29/2018 2018 3/27/2018    *MA Screening Digital Bilateral Routine  3/27/2019 3/27/2018            Who to contact     If you have questions or need follow up information about today's clinic visit or your schedule please contact [facility] directly at [phone].  Normal or non-critical lab and imaging results will be communicated to you by MyChart, letter or phone within 4 business days after the clinic has received the results. If you do not hear from us within 7 days, please contact the clinic through [employer] or phone. If you have a critical or abnormal lab result, we will notify you by phone as soon as possible.  Submit refill requests through [employer] or call your pharmacy and they will forward the refill request to us. Please allow 3 business days for your refill to be completed.          Additional Information About Your Visit        [employer] Information     [employer] lets you send messages to your doctor, view your test results, renew your prescriptions, schedule appointments and more. To sign up, go to [email or web address] . Click on \"Log in\" on the left side of the screen, which will take you to the Welcome page. Then click on \"Sign up Now\" on the right side of the page.     You will be asked to enter the access code listed below, as well as some personal information. Please follow the directions to create your username and password.     Your access code is: [email or web address]  Expires: 2018  5:29 PM     Your access code will  in 90 days. If you need " "help or a new code, please call your [facility] clinic or [phone].        Care EveryWhere ID     This is your Care EveryWhere ID. This could be used by other organizations to access your [facility] medical records  [id number]        Your Vitals Were     Pulse Temperature Height Pulse Oximetry BMI (Body Mass Index)       84 98.7  F (37.1  C) (Oral) 1.568 m (5' 1.75\") 100% 23.05 kg/m2        Blood Pressure from Last 3 Encounters:   03/27/18 123/62   03/16/18 116/79   01/05/18 133/86    Weight from Last 3 Encounters:   03/27/18 56.7 kg (125 lb)   03/16/18 57.7 kg (127 lb 3.2 oz)   01/05/18 57.2 kg (126 lb)              We Performed the Following     Rapid strep screen     UA reflex to Microscopic and Culture     Urine Culture Aerobic Bacterial     Urine Microscopic          Today's Medication Changes          These changes are accurate as of 3/27/18  3:21 PM.  If you have any questions, ask your nurse or doctor.               Start taking these medicines.        Dose/Directions    HYDROcodone-acetaminophen 5-325 MG per tablet   Commonly known as:  NORCO   Used for:  Flank pain   Started by:  Rodney Desai MD        Dose:  1 tablet   Take 1 tablet by mouth every 6 hours as needed for pain maximum 4 tablet(s) per day   Quantity:  16 tablet   Refills:  0            Where to get your medicines      Some of these will need a paper prescription and others can be bought over the counter.  Ask your nurse if you have questions.     Bring a paper prescription for each of these medications     HYDROcodone-acetaminophen 5-325 MG per tablet                Primary Care Provider Office Phone # Fax #    Rodney Desai [phone]       [address]  [facility] [address]        Equal Access to Services     [facility] JAYLEN : Lloyd Falcon, waaxda luqadaha, qaybta kaalmada sarah, kervin morris. So [facility] [phone].    ATENCIÓN: Si andrei espquoc, gregorio carney " disposición servicios gratuitos de asistencia lingüística. Deann james [phone].    We comply with applicable federal civil rights laws and [place] laws. We do not discriminate on the basis of race, color, national origin, age, disability, sex, sexual orientation, or gender identity.            Thank you!     Thank you for choosing [facility]  for your care. Our goal is always to provide you with excellent care. Hearing back from our patients is one way we can continue to improve our services. Please take a few minutes to complete the written survey that you may receive in the mail after your visit with us. Thank you!             Your Updated Medication List - Protect others around you: Learn how to safely use, store and throw away your medicines at www.disposemymeds.org.          This list is accurate as of 3/27/18  3:21 PM.  Always use your most recent med list.                   Brand Name Dispense Instructions for use Diagnosis    HYDROcodone-acetaminophen 5-325 MG per tablet    NORCO    16 tablet    Take 1 tablet by mouth every 6 hours as needed for pain maximum 4 tablet(s) per day    Flank pain       zolpidem 5 MG tablet    AMBIEN    30 tablet    Take 0.5-1 tablets (2.5-5 mg) by mouth nightly as needed for sleep (take right at bedtime)    Psychophysiological insomnia

## 2018-03-27 NOTE — NURSING NOTE
Patient declined mammogram.  Patient will wait until summer to get mammogram done.    Marcos Brown MA

## 2018-03-27 NOTE — PATIENT INSTRUCTIONS
At [facility], we strive to deliver an exceptional experience to you, every time we see you.  If you receive a survey in the mail, please send us back your thoughts. We really do value your feedback.    Based on your medical history, these are the current health maintenance/preventive care services that you are due for (some may have been done at this visit.)  Health Maintenance Due   Topic Date Due     MAMMO SCREEN Q2 YR (SYSTEM ASSIGNED)  12/10/2017         Suggested websites for health information:  Www.[employer].org : Up to date and easily searchable information on multiple topics.  Www.[employer].gov : medication info, interactive tutorials, watch real surgeries online  Www.familydoctor.org : good info from the Academy of Family Physicians  Www.cdc.gov : public health info, travel advisories, epidemics (H1N1)  Www.aap.org : children's health info, normal development, vaccinations  Www.health.[facility].[place].us : [place] dept of health, public health issues in [place], N1N1    Your care team:                            Family Medicine Internal Medicine   MD Colton Brito MD Shantel Branch-Fleming, MD Katya Georgiev PA-C Nam Ho, MD Pediatrics   [facility] [place], JANKI Joaquin, CNP Marcy JALLOH CNP   MD Yesenia Navarrete MD Deborah Mielke, MD Kim Thein, APRN CNP      Clinic hours: Monday - Thursday 7 am-7 pm; Fridays 7 am-5 pm.   Urgent care: Monday - Friday 11 am-9 pm; Saturday and Sunday 9 am-5 pm.  Pharmacy : Monday -Thursday 8 am-8 pm; Friday 8 am-6 pm; Saturday and Sunday 9 am-5 pm.     Clinic: [phone]   Pharmacy: [phone]      Please call the [facility]  [phone] to schedule your abdominal and pelvic CT and mammogram.

## 2018-03-27 NOTE — PROGRESS NOTES
"  SUBJECTIVE:   Paco Medrano is a 58 year old female who presents to clinic today for the following health issues:    FLANK PAIN     Onset: 12 days    Description:   Character: Sharp  Location: right flank and left flank, lumbosacral  Radiation: None    Intensity: severe    Progression of Symptoms:  Improved on 3/23 and 3/24 then worsened on 3/25    Accompanying Signs & Symptoms:  Fever/Chills?: YES- chills  Gas/Bloating: YES  Nausea: no   Vomitting: no   Diarrhea?: no   Constipation:no   Dysuria or Hematuria: no can't tell (toilet bowl water is blue)   History:   Trauma: no   Previous similar pain: no    Previous tests done: none    Precipitating factors:   Does the pain change with:     Food: no      BM: no     Urination: no     Alleviating factors:      Therapies Tried and outcome: Seen at  on 3/16/18, given vicodin, helps a lot with the pain    LMP:  not applicable       Past medical, family, and social histories, medications, and allergies are reviewed and updated in [facility].     ROS:  CONSTITUTIONAL: NEGATIVE for fever, chills, change in weight  ENT/MOUTH: She notes she had a sore throat yesterday and requests to be tested for Strep.  RESP: NEGATIVE for significant cough or SOB  CV: NEGATIVE for chest pain, palpitations or peripheral edema  ROS otherwise negative    This document serves as a record of the services and decisions personally performed and made by Dr. Desai. It was created on his behalf by Lenore Saldivar, a trained medical scribe. The creation of this document is based the provider's statements to the medical scribe.  Lenore Saldivar March 27, 2018 2:58 PM     OBJECTIVE:                                                    /62 (BP Location: Left arm, Patient Position: Chair, Cuff Size: Adult Regular)  Pulse 84  Temp 98.7  F (37.1  C) (Oral)  Ht 1.568 m (5' 1.75\")  Wt 56.7 kg (125 lb)  SpO2 100%  BMI 23.05 kg/m2   Body mass index is 23.05 kg/(m^2).     GENERAL: healthy, alert and " no distress  EYES: Eyes grossly normal to inspection, PERRL, EOMI, sclerae white and conjunctivae normal  HENT: mouth: mild erythema  RESP: lungs clear to auscultation - no crackles or wheezes, no areas of dullness, no tachypnea  CV: Heart regular rate and rhythm without murmur, click or rub. No peripheral edema and peripheral pulses strong  MS: no gross musculoskeletal defects noted, no edema  BACK: No CVA tenderness  ABDOMEN: soft, mild suprapubic tenderness, no hepatosplenomegaly, no masses and bowel sounds normal  SKIN: no suspicious lesions or rashes  NEURO: Normal strength and tone, sensory exam grossly normal, mentation intact, oriented times 3 and cranial nerves 2-12 intact  PSYCH: mentation appears normal, affect normal/bright     Diagnostic Test Results:  Results for orders placed or performed in visit on 03/27/18 (from the past 24 hour(s))   UA reflex to Microscopic and Culture   Result Value Ref Range    Color Urine Yellow     Appearance Urine Clear     Glucose Urine Negative NEG^Negative mg/dL    Bilirubin Urine Negative NEG^Negative    Ketones Urine Negative NEG^Negative mg/dL    Specific Gravity Urine 1.025 1.003 - 1.035    Blood Urine Moderate (A) NEG^Negative    pH Urine 5.5 5.0 - 7.0 pH    Protein Albumin Urine Negative NEG^Negative mg/dL    Urobilinogen Urine 0.2 0.2 - 1.0 EU/dL    Nitrite Urine Negative NEG^Negative    Leukocyte Esterase Urine Negative NEG^Negative    Source Midstream Urine    Urine Microscopic   Result Value Ref Range    WBC Urine 0 - 5 OTO5^0 - 5 /HPF    RBC Urine 2-5 (A) OTO2^O - 2 /HPF    Squamous Epithelial /LPF Urine Few FEW^Few /LPF    Bacteria Urine Moderate (A) NEG^Negative /HPF   Rapid strep screen   Result Value Ref Range    Specimen Description Throat     Rapid Strep A Screen       NEGATIVE: No Group A streptococcal antigen detected by immunoassay, await culture report.        ASSESSMENT/PLAN:                                                      (R10.9) Flank pain   (primary encounter diagnosis)  Comment: There still is some microhematuria. I want to identify a kidney stone before we start treating empirically for renal colic for too long of a period. Her back pain is a little lower than usual for renal colic.  Plan: UA reflex to Microscopic and Culture, Urine         Microscopic, CT Abdomen Pelvis w/o Contrast,         HYDROcodone-acetaminophen (NORCO) 5-325 MG per         tablet, Urine Culture Aerobic Bacterial        Follow up depending on CT results.     (R07.0) Throat pain  Comment: Strep negative.  Plan: Rapid strep screen            (Z12.31) Encounter for screening mammogram for breast cancer  Comment: She is encouraged to have this done at the same time as her CT scan.  Plan: *MA Screening Digital Bilateral            The information in this document, created by the medical scribe for me, accurately reflects the services I personally performed and the decisions made by me. I have reviewed and approved this document for accuracy prior to leaving the patient care area. March 27, 2018 2:58 PM   Rodney Desai MD

## 2018-03-28 LAB
BACTERIA SPEC CULT: NORMAL
BACTERIA SPEC CULT: NORMAL
SPECIMEN SOURCE: NORMAL
SPECIMEN SOURCE: NORMAL

## 2018-03-29 ENCOUNTER — RADIANT APPOINTMENT (OUTPATIENT)
Dept: CT IMAGING | Facility: CLINIC | Age: 58
End: 2018-03-29
Attending: FAMILY MEDICINE
Payer: COMMERCIAL

## 2018-03-29 DIAGNOSIS — R10.9 FLANK PAIN: ICD-10-CM

## 2018-03-29 PROCEDURE — 74176 CT ABD & PELVIS W/O CONTRAST: CPT | Performed by: RADIOLOGY

## 2018-04-04 ENCOUNTER — TELEPHONE (OUTPATIENT)
Dept: FAMILY MEDICINE | Facility: CLINIC | Age: 58
End: 2018-04-04

## 2018-04-04 NOTE — TELEPHONE ENCOUNTER
Per patient request, writer left a detailed message describing what Diverticulosis is, writer instructed patient to call back if she has further questions. Closing encounter.     Selin Aguiar RN

## 2018-04-04 NOTE — TELEPHONE ENCOUNTER
Patient CT showed patient to have Diverticulosis  She would like a call back to discuss what that means  Dr Desai or his nurse is ok  Ok to leave message  [phone]

## 2018-07-10 DIAGNOSIS — F51.04 PSYCHOPHYSIOLOGICAL INSOMNIA: ICD-10-CM

## 2018-07-10 RX ORDER — ZOLPIDEM TARTRATE 5 MG/1
2.5-5 TABLET ORAL
Qty: 30 TABLET | Refills: 5 | Status: SHIPPED | OUTPATIENT
Start: 2018-07-10 | End: 2019-01-11

## 2018-07-10 NOTE — TELEPHONE ENCOUNTER
ambien 5mg tablets      Last Written Prescription Date:  1/5/2018  Last Fill Quantity: 30,   # refills: 5  Last Office Visit: 3/27/2018  Future Office visit:       Routing refill request to provider for review/approval because:  Drug not on the [facility], [facility] or [facility] refill protocol or controlled substance    Thank you,  Dee Villaseñor, Pharmacy Intern  [facility] [facility]

## 2018-07-17 ENCOUNTER — OFFICE VISIT (OUTPATIENT)
Dept: FAMILY MEDICINE | Facility: CLINIC | Age: 58
End: 2018-07-17
Payer: COMMERCIAL

## 2018-07-17 VITALS
HEART RATE: 91 BPM | WEIGHT: 125 LBS | SYSTOLIC BLOOD PRESSURE: 131 MMHG | DIASTOLIC BLOOD PRESSURE: 84 MMHG | BODY MASS INDEX: 23 KG/M2 | HEIGHT: 62 IN | TEMPERATURE: 98.1 F

## 2018-07-17 DIAGNOSIS — F41.9 ANXIETY: ICD-10-CM

## 2018-07-17 DIAGNOSIS — Z12.31 VISIT FOR SCREENING MAMMOGRAM: ICD-10-CM

## 2018-07-17 DIAGNOSIS — J01.90 ACUTE SINUSITIS WITH SYMPTOMS > 10 DAYS: Primary | ICD-10-CM

## 2018-07-17 DIAGNOSIS — F33.0 MAJOR DEPRESSIVE DISORDER, RECURRENT EPISODE, MILD (H): ICD-10-CM

## 2018-07-17 PROCEDURE — 99214 OFFICE O/P EST MOD 30 MIN: CPT | Performed by: FAMILY MEDICINE

## 2018-07-17 RX ORDER — DOXYCYCLINE 100 MG/1
100 TABLET ORAL 2 TIMES DAILY
Qty: 20 TABLET | Refills: 0 | Status: SHIPPED | OUTPATIENT
Start: 2018-07-17 | End: 2018-07-27

## 2018-07-17 ASSESSMENT — ANXIETY QUESTIONNAIRES
3. WORRYING TOO MUCH ABOUT DIFFERENT THINGS: NEARLY EVERY DAY
7. FEELING AFRAID AS IF SOMETHING AWFUL MIGHT HAPPEN: NEARLY EVERY DAY
1. FEELING NERVOUS, ANXIOUS, OR ON EDGE: NEARLY EVERY DAY
2. NOT BEING ABLE TO STOP OR CONTROL WORRYING: NEARLY EVERY DAY
GAD7 TOTAL SCORE: 19
5. BEING SO RESTLESS THAT IT IS HARD TO SIT STILL: MORE THAN HALF THE DAYS
6. BECOMING EASILY ANNOYED OR IRRITABLE: MORE THAN HALF THE DAYS
IF YOU CHECKED OFF ANY PROBLEMS ON THIS QUESTIONNAIRE, HOW DIFFICULT HAVE THESE PROBLEMS MADE IT FOR YOU TO DO YOUR WORK, TAKE CARE OF THINGS AT HOME, OR GET ALONG WITH OTHER PEOPLE: SOMEWHAT DIFFICULT

## 2018-07-17 ASSESSMENT — PATIENT HEALTH QUESTIONNAIRE - PHQ9: 5. POOR APPETITE OR OVEREATING: NEARLY EVERY DAY

## 2018-07-17 ASSESSMENT — PAIN SCALES - GENERAL: PAINLEVEL: MODERATE PAIN (4)

## 2018-07-17 NOTE — MR AVS SNAPSHOT
After Visit Summary   7/17/2018    Paco Medrano    MRN: [id number]           Patient Information     Date Of Birth          1960        Visit Information        Provider Department      7/17/2018 4:00 PM Rodney Desai MD [facility]        Today's Diagnoses     Acute sinusitis with symptoms > 10 days    -  1    Major depressive disorder, recurrent episode, mild (H)        Anxiety        Visit for screening mammogram          Care Inst[facility]    [facility] to you, every time we see you.  If you receive a survey in the mail, please send us back your thoughts. We really do value your feedback.    Based on your medical history, these are the current health maintenance/preventive care services that you are due for (some may have been done at this visit.)  Health Maintenance Due   Topic Date Due     HIV SCREEN (SYSTEM ASSIGNED)  01/16/1978     MAMMO SCREEN Q2 YR (SYSTEM ASSIGNED)  12/10/2017     PHQ-2 Q1 YR  05/25/2018       Suggested websites for health information:  Www.Tribesports.org : Up to date and easily searchable information on multiple topics.  Www.medlineplus.gov : medication info, interactive tutorials, watch real surgeries online  Www.familydoctor.org : good info from the Academy of Family Physicians  Www.cdc.gov : public health info, travel advisories, epidemics (H1N1)  Www.aap.org : children's health info, normal development, vaccinations  Www.health.state[place] : [place] dept of health, public health issues in [place], N1N1    Your care team:                            Family Medicine Internal Medicine   MD Colton Brito MD Shantel Branch-Fleming, MD Katya Georgiev PA-C Megan Hill, SHUBHAM Leigh MD Pediatrics   JANKI Perez, MD Marcy Bingham APRN CNP   MD Yesenia Navarrete MD Deborah Mielke, MD Kim Thein, APRN  "CNP      Clinic hours: Monday - Thursday 7 am-7 pm; Fridays 7 am-5 pm.   Urgent care: Monday - Friday 11 am-9 pm; Saturday and Sunday 9 am-5 pm.  Pharmacy : Monday -Thursday 8 am-8 pm; Friday 8 am-6 pm; Saturday and Sunday 9 am-5 pm.     Clinic: [phone]   Pharmacy: [phone]    Return for recheck of sinus symptoms if they have not resolved by 7/31/18    Please call the [facility]  [phone] to schedule your mammogram          Follow-ups after your visit        Follow-up notes from your care team     Return in about 6 weeks (around 8/28/2018) for recheck depression.      Future tests that were ordered for you today     Open Future Orders        Priority Expected Expires Ordered    MA SCREENING DIGITAL BILAT - Future  (s+30) Routine  7/17/2019 7/17/2018            Who to contact     If you have questions or need follow up information about today's clinic visit or your schedule please contact [facility] directly at [phone].  Normal or non-critical lab and imaging results will be communicated to you by MyChart, letter or phone within 4 business days after the clinic has received the results. If you do not hear from us within 7 days, please contact the clinic through Audicushart or phone. If you have a critical or abnormal lab result, we will notify you by phone as soon as possible.  Submit refill requests through Equiendo or call your pharmacy and they will forward the refill request to us. Please allow 3 business days for your refill to be completed.          Additional Information About Your Visit        Audicushart Information     Equiendo lets you send messages to your doctor, view your test results, renew your prescriptions, schedule appointments and more. To sign up, go to [email or web address] . Click on \"Log in\" on the left side of the screen, which will take you to the Welcome page. Then click on \"Sign up Now\" on the right side of the page.     You " "will be asked to enter the access code listed below, as well as some personal information. Please follow the directions to create your username and password.     Your access code is: [email or web address]  Expires: 10/15/2018  4:37 PM     Your access code will  in 90 days. If you need help or a new code, please call your [facility] clinic or [phone].        Care EveryWhere ID     This is your Care EveryWhere ID. This could be used by other organizations to access your [facility] medical records  [id number]        Your Vitals Were     Pulse Temperature Height BMI (Body Mass Index)          91 98.1  F (36.7  C) (Oral) 1.568 m (5' 1.75\") 23.05 kg/m2         Blood Pressure from Last 3 Encounters:   18 131/84   18 123/62   18 116/79    Weight from Last 3 Encounters:   18 56.7 kg (125 lb)   18 56.7 kg (125 lb)   18 57.7 kg (127 lb 3.2 oz)              We Performed the Following     DEPRESSION ACTION PLAN (DAP)          Today's Medication Changes          These changes are accurate as of 18  4:37 PM.  If you have any questions, ask your nurse or doctor.               Start taking these medicines.        Dose/Directions    amoxicillin-clavulanate 875-125 MG per tablet   Commonly known as:  AUGMENTIN   Used for:  Acute sinusitis with symptoms > 10 days   Started by:  Rodney Desai MD        Dose:  1 tablet   Take 1 tablet by mouth 2 times daily for 10 days for sinus infection.   Quantity:  20 tablet   Refills:  0         These medicines have changed or have updated prescriptions.        Dose/Directions    * sertraline 50 MG tablet   Commonly known as:  ZOLOFT   This may have changed:  You were already taking a medication with the same name, and this prescription was added. Make sure you understand how and when to take each.   Used for:  Major depressive disorder, recurrent episode, mild (H), Anxiety   Changed by:  Rodney Desai MD        Dose:  50 mg   Take 1 tablet (50 mg) " by mouth At Bedtime for depression and anxiety prevention.   Quantity:  30 tablet   Refills:  1       * sertraline 50 MG tablet   Commonly known as:  ZOLOFT   This may have changed:  Another medication with the same name was added. Make sure you understand how and when to take each.   Used for:  Major depressive disorder, recurrent episode, mild (H), Anxiety   Changed by:  Rodney Desai MD        Take 1/2 tablet daily for the first week, then increase to 1 tablet daily.   Refills:  0       * Notice:  This list has 2 medication(s) that are the same as other medications prescribed for you. Read the directions carefully, and ask your doctor or other care provider to review them with you.         Where to get your medicines      These medications were [place] [facility] [place] - [address]  [address], [facility] [address]     Phone:  [phone]     amoxicillin-clavulanate 875-125 MG per tablet    sertraline 50 MG tablet                Primary Care Provider Office Phone # Fax #    Rodney Desai [phone]       [address]  [facility] [address]        Equal Access to Services     [facility] : Hadii aad ku hadasho Soomaali, waaxda luqadaha, qaybta kaalmada adeegyada, waxay kathrynin hayalistairn daniel morris. So [facility] [phone].    ATENCIÓN: Si habla español, tiene a carney disposición servicios gratuitos de asistencia lingüística. [facility] [phone].    We comply with applicable federal civil rights laws and [place] laws. We do not discriminate on the basis of race, color, national origin, age, disability, sex, sexual orientation, or gender identity.            Thank you!     Thank you for choosing [facility]  for your care. Our goal is always to provide you with excellent care. Hearing back from our patients is one way we can continue to improve our services. Please take a few minutes to complete the written  survey that you may receive in the mail after your visit with us. Thank you!             Your Updated Medication List - Protect others around you: Learn how to safely use, store and throw away your medicines at www.disposemymeds.org.          This list is accurate as of 7/17/18  4:37 PM.  Always use your most recent med list.                   Brand Name Dispense Instructions for use Diagnosis    amoxicillin-clavulanate 875-125 MG per tablet    AUGMENTIN    20 tablet    Take 1 tablet by mouth 2 times daily for 10 days for sinus infection.    Acute sinusitis with symptoms > 10 days       * sertraline 50 MG tablet    ZOLOFT    30 tablet    Take 1 tablet (50 mg) by mouth At Bedtime for depression and anxiety prevention.    Major depressive disorder, recurrent episode, mild (H), Anxiety       * sertraline 50 MG tablet    ZOLOFT     Take 1/2 tablet daily for the first week, then increase to 1 tablet daily.    Major depressive disorder, recurrent episode, mild (H), Anxiety       zolpidem 5 MG tablet    AMBIEN    30 tablet    Take 0.5-1 tablets (2.5-5 mg) by mouth nightly as needed for sleep (take right at bedtime)    Psychophysiological insomnia       * Notice:  This list has 2 medication(s) that are the same as other medications prescribed for you. Read the directions carefully, and ask your doctor or other care provider to review them with you.
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
## 2018-07-17 NOTE — PATIENT INSTRUCTIONS
At [facility], we strive to deliver an exceptional experience to you, every time we see you.  If you receive a survey in the mail, please send us back your thoughts. We really do value your feedback.    Based on your medical history, these are the current health maintenance/preventive care services that you are due for (some may have been done at this visit.)  Health Maintenance Due   Topic Date Due     HIV SCREEN (SYSTEM ASSIGNED)  01/16/1978     MAMMO SCREEN Q2 YR (SYSTEM ASSIGNED)  12/10/2017     PHQ-2 Q1 YR  05/25/2018       Suggested websites for health information:  Www.[employer] : Up to date and easily searchable information on multiple topics.  Www.medlineplus.gov : medication info, interactive tutorials, watch real surgeries online  Www.familydoctor.org : good info from the Academy of Family Physicians  Www.cdc.gov : public health info, travel advisories, epidemics (H1N1)  Www.aap.org : children's health info, normal development, vaccinations  Www.health.[facility].[place].us : [place] dept of health, public health issues in [place], N1N1    Your care team:                            Family Medicine Internal Medicine   MD Colton Brito MD Shantel Branch-Fleming, MD Katya Georgiev PA-C Megan Hill, APRN GUALBERTO Leigh MD Pediatrics   JANKI Perez, MD Marcy Bingham APRN CNP   MD Yesenia Navarrete MD Deborah Mielke, MD Kim Thein, APRN [facility]      Clinic hours: Monday - Thursday 7 am-7 pm; Fridays 7 am-5 pm.   Urgent care: Monday - Friday 11 am-9 pm; Saturday and Sunday 9 am-5 pm.  Pharmacy : Monday -Thursday 8 am-8 pm; Friday 8 am-6 pm; Saturday and Sunday 9 am-5 pm.     Clinic: [phone]   Pharmacy: [phone]    Return for recheck of sinus symptoms if they have not resolved by 7/31/18    Please call the [facility]  [phone] to schedule your mammogram

## 2018-07-17 NOTE — LETTER
My Depression Action Plan  Name: Paco Medrano   Date of Birth 1960  Date: 7/17/2018    My doctor: Rodney Desai   My clinic: [address] [id number] [phone]          GREEN    ZONE   Good Control    What it looks like:     Things are going generally well. You have normal up s and down s. You may even feel depressed from time to time, but bad moods usually last less than a day.   What you need to do:  1. Continue to care for yourself (see self care plan)  2. Check your depression survival kit and update it as needed  3. Follow your physician s recommendations including any medication.  4. Do not stop taking medication unless you consult with your physician first.           YELLOW         ZONE Getting Worse    What it looks like:     Depression is starting to interfere with your life.     It may be hard to get out of bed; you may be starting to isolate yourself from others.    Symptoms of depression are starting to last most all day and this has happened for several days.     You may have suicidal thoughts but they are not constant.   What you need to do:     1. Call your care team, your response to treatment will improve if you keep your care team informed of your progress. Yellow periods are signs an adjustment may need to be made.     2. Continue your self-care, even if you have to fake it!    3. Talk to someone in your support network    4. Open up your depression survival kit           RED    ZONE Medical Alert - Get Help    What it looks like:     Depression is seriously interfering with your life.     You may experience these or other symptoms: You can t get out of bed most days, can t work or engage in other necessary activities, you have trouble taking care of basic hygiene, or basic responsibilities, thoughts of suicide or death that will not go away, self-injurious behavior.     What you need to do:  1. Call your  care team and request a same-day appointment. If they are not available (weekends or after hours) call your local crisis line, emergency room or 911.            Depression Self Care Plan / Survival Kit    Self-Care for Depression  Here s the deal. Your body and mind are really not as separate as most people think.  What you do and think affects how you feel and how you feel influences what you do and think. This means if you do things that people who feel good do, it will help you feel better.  Sometimes this is all it takes.  There is also a place for medication and therapy depending on how severe your depression is, so be sure to consult with your medical provider and/ or Behavioral Health Consultant if your symptoms are worsening or not improving.     In order to better manage my stress, I will:    Exercise  Get some form of exercise, every day. This will help reduce pain and release endorphins, the  feel good  chemicals in your brain. This is almost as good as taking antidepressants!  This is not the same as joining a gym and then never going! (they count on that by the way ) It can be as simple as just going for a walk or doing some gardening, anything that will get you moving.      Hygiene   Maintain good hygiene (Get out of bed in the morning, Make your bed, Brush your teeth, Take a shower, and Get dressed like you were going to work, even if you are unemployed).  If your clothes don't fit try to get ones that do.    Diet  I will strive to eat foods that are good for me, drink plenty of water, and avoid excessive sugar, caffeine, alcohol, and other mood-altering substances.  Some foods that are helpful in depression are: complex carbohydrates, B vitamins, flaxseed, fish or fish oil, fresh fruits and vegetables.    Psychotherapy  I agree to participate in Individual Therapy (if recommended).    Medication  If prescribed medications, I agree to take them.  Missing doses can result in serious side effects.  I  understand that drinking alcohol, or other illicit drug use, may cause potential side effects.  I will not stop my medication abruptly without first discussing it with my provider.    Staying Connected With Others  I will stay in touch with my friends, family members, and my primary care provider/team.    Use your imagination  Be creative.  We all have a creative side; it doesn t matter if it s oil painting, sand castles, or mud pies! This will also kick up the endorphins.    Witness Beauty  (AKA stop and smell the roses) Take a look outside, even in mid-winter. Notice colors, textures. Watch the squirrels and birds.     Service to others  Be of service to others.  There is always someone else in need.  By helping others we can  get out of ourselves  and remember the really important things.  This also provides opportunities for practicing all the other parts of the program.    Humor  Laugh and be silly!  Adjust your TV habits for less news and crime-drama and more comedy.    Control your stress  Try breathing deep, massage therapy, biofeedback, and meditation. Find time to relax each day.     My support system    Clinic Contact:  Phone number:    Contact 1:  Phone number:    Contact 2:  Phone number:    Druze/:  Phone number:    Therapist:  Phone number:    Local crisis center:    Phone number:    Other community support:  Phone number:

## 2018-07-18 ASSESSMENT — PATIENT HEALTH QUESTIONNAIRE - PHQ9: SUM OF ALL RESPONSES TO PHQ QUESTIONS 1-9: 11

## 2018-07-18 ASSESSMENT — ANXIETY QUESTIONNAIRES: GAD7 TOTAL SCORE: 19

## 2018-08-31 ENCOUNTER — OFFICE VISIT (OUTPATIENT)
Dept: URGENT CARE | Facility: URGENT CARE | Age: 58
End: 2018-08-31
Payer: COMMERCIAL

## 2018-08-31 VITALS
TEMPERATURE: 98.1 F | HEART RATE: 89 BPM | SYSTOLIC BLOOD PRESSURE: 130 MMHG | BODY MASS INDEX: 22.64 KG/M2 | OXYGEN SATURATION: 100 % | DIASTOLIC BLOOD PRESSURE: 84 MMHG | WEIGHT: 122.8 LBS

## 2018-08-31 DIAGNOSIS — J01.90 ACUTE SINUSITIS WITH SYMPTOMS > 10 DAYS: Primary | ICD-10-CM

## 2018-08-31 PROCEDURE — 99213 OFFICE O/P EST LOW 20 MIN: CPT | Performed by: FAMILY MEDICINE

## 2018-08-31 RX ORDER — DOXYCYCLINE 100 MG/1
100 CAPSULE ORAL 2 TIMES DAILY
Qty: 20 CAPSULE | Refills: 0 | Status: SHIPPED | OUTPATIENT
Start: 2018-08-31 | End: 2018-09-26

## 2018-08-31 NOTE — MR AVS SNAPSHOT
After Visit Summary   8/31/2018    Paco Medrano    MRN: [id number]           Patient Information     Date Of Birth          1960        Visit Information        Provider Department      8/31/2018 6:50 PM Darrick Hatfield MD [facility]        Today's Diagnoses     Acute sinusitis with symptoms > 10 days    -  1      Care Instructions      Sinusitis (Antibiotic Treatment)    The sinuses are air-filled spaces within the bones of the face. They connect to the inside of the nose. Sinusitis is an inflammation of the tissue that lines the sinuses. Sinusitis can occur during a cold. It can also happen due to allergies to pollens and other particles in the air. Sinusitis can cause symptoms of sinus congestion and a feeling of fullness. A sinus infection causes fever, headache, and facial pain. There is often green or yellow fluid draining from the nose or into the back of the throat (post-nasal drip). You have been given antibiotics to treat this condition.  Home care    Take the full course of antibiotics as instructed. Do not stop taking them, even when you feel better.    Drink plenty of water, hot tea, and other liquids. This may help thin nasal mucus. It also may help your sinuses drain fluids.    Heat may help soothe painful areas of your face. Use a towel soaked in hot water. Or,  the shower and direct the warm spray onto your face. Using a vaporizer along with a menthol rub at night may also help soothe symptoms.     An expectorant with guaifenesin may help thin nasal mucus and help your sinuses drain fluids.    You can use an over-the-counter decongestant, unless a similar medicine was prescribed to you. Nasal sprays work the fastest. Use one that contains phenylephrine or oxymetazoline. First blow your nose gently. Then use the spray. Do not use these medicines more often than directed on the label. If you do, your symptoms may get worse. You may also take pills  that contain pseudoephedrine. Don t use products that combine multiple medicines. This is because side effects may be increased. Read labels. You can also ask the pharmacist for help. (People with high blood pressure should not use decongestants. They can raise blood pressure.)    Over-the-counter antihistamines may help if allergies contributed to your sinusitis.      Do not use nasal rinses or irrigation during an acute sinus infection, unless your healthcare provider tells you to. Rinsing may spread the infection to other areas in your sinuses.    Use acetaminophen or ibuprofen to control pain, unless another pain medicine was prescribed to you. If you have chronic liver or kidney disease or ever had a stomach ulcer, talk with your healthcare provider before using these medicines. (Aspirin should never be taken by anyone under age 18 who is ill with a fever. It may cause severe liver damage.)    Don't smoke. This can make symptoms worse.  Follow-up care  Follow up with your healthcare provider or our staff if you are better in 1 week.  When to seek medical advice  Call your healthcare provider if any of these occur:    Facial pain or headache that gets worse    Stiff neck    Unusual drowsiness or confusion    Swelling of your forehead or eyelids    Vision problems, such as blurred or double vision    Fever of 100.4 F (38 C) or higher, or as directed by your healthcare provider    Seizure    Breathing problems    Symptoms don't go away in 10 days  Prevention  Here are steps you can take to help prevent an infection:    Keep good hand washing habits.    Don t have close contact with people who have sore throats, colds, or other upper respiratory infections.    Don t smoke, and stay away from secondhand smoke.    Stay up to date with of your vaccines.  Date Last Reviewed: 11/1/2017 2000-2017 [employer]. [address] [place] [address], [place] [address]. All rights reserved. This information is not intended  "as a substitute for professional medical care. Always follow your healthcare professional's instructions.                Follow-ups after your visit        Who to contact     If you have questions or need follow up information about today's clinic visit or your schedule please contact [facility] directly at [phone].  Normal or non-critical lab and imaging results will be communicated to you by MyChart, letter or phone within 4 business days after the clinic has received the results. If you do not hear from us within 7 days, please contact the clinic through [employer] or phone. If you have a critical or abnormal lab result, we will notify you by phone as soon as possible.  Submit refill requests through [employer]-CODES or call your pharmacy and they will forward the refill request to us. Please allow 3 business days for your refill to be completed.          Additional Information About Your Visit        MyChart Information     O-CODES lets you send messages to your doctor, view your test results, renew your prescriptions, schedule appointments and more. To sign up, go to [email or web address] . Click on \"Log in\" on the left side of the screen, which will take you to the Welcome page. Then click on \"Sign up Now\" on the right side of the page.     You will be asked to enter the access code listed below, as well as some personal information. Please follow the directions to create your username and password.     Your access code is: [email or web address]  Expires: 10/15/2018  4:37 PM     Your access code will  in 90 days. If you need help or a new code, please call your [place] clinic or [phone].        Care EveryWhere ID     This is your Care EveryWhere ID. This could be used by other organizations to access your [place] medical records  [id number]        Your Vitals Were     Pulse Temperature Pulse Oximetry BMI (Body Mass Index)          89 98.1  F (36.7  C) (Oral) 100% 22.64 kg/m2         Blood " Pressure from Last 3 Encounters:   08/31/18 130/84   07/17/18 131/84   03/27/18 123/62    Weight from Last 3 Encounters:   08/31/18 122 lb 12.8 oz (55.7 kg)   07/17/18 125 lb (56.7 kg)   03/27/18 125 lb (56.7 kg)              Today, you had the following     No orders found for display         Today's Medication Changes          These changes are accurate as of 8/31/18  7:14 PM.  If you have any questions, ask your nurse or doctor.               Start taking these medicines.        Dose/Directions    doxycycline 100 MG capsule   Commonly known as:  VIBRAMYCIN   Used for:  Acute sinusitis with symptoms > 10 days   Started by:  Darrick Hatfield MD        Dose:  100 mg   Take 1 capsule (100 mg) by mouth 2 times daily   Quantity:  20 capsule   Refills:  0            Where to get your medicines      These medications were sent to [facility] [address] [place] [address] [address] [place] AT [facility] [address]  [address] [place] [address]     Phone:  [phone]     doxycycline 100 MG capsule                Primary Care Provider Office Phone # Fax #    Rodney Desai [phone] [address]  [facility] [address]        Equal Access to Services     [facility] [place] [facility]: Hadii aad ku hadasho Soomaali, waaxda luqadaha, qaybta kaalmada adeegyada, waxay idiin hayaan adeviola kharaartur morris. So [facility] [phone].    ATENCIÓN: Si habla español, tiene a carney disposición servicios gratuitos de asistencia lingüística. Llame al [phone].    We comply with applicable federal civil rights laws and [place] laws. We do not discriminate on the basis of race, color, national origin, age, disability, sex, sexual orientation, or gender identity.            Thank you!     Thank you for choosing [facility]  for your care. Our goal is always to provide you with excellent care. Hearing back from our patients is one way we can continue to improve our services. Please  take a few minutes to complete the written survey that you may receive in the mail after your visit with us. Thank you!             Your Updated Medication List - Protect others around you: Learn how to safely use, store and throw away your medicines at www.disposemymeds.org.          This list is accurate as of 8/31/18  7:14 PM.  Always use your most recent med list.                   Brand Name Dispense Instructions for use Diagnosis    doxycycline 100 MG capsule    VIBRAMYCIN    20 capsule    Take 1 capsule (100 mg) by mouth 2 times daily    Acute sinusitis with symptoms > 10 days       sertraline 50 MG tablet    ZOLOFT    30 tablet    Take 1 tablet (50 mg) by mouth At Bedtime for depression and anxiety prevention.    Major depressive disorder, recurrent episode, mild (H), Anxiety       zolpidem 5 MG tablet    AMBIEN    30 tablet    Take 0.5-1 tablets (2.5-5 mg) by mouth nightly as needed for sleep (take right at bedtime)    Psychophysiological insomnia

## 2018-09-01 NOTE — PATIENT INSTRUCTIONS

## 2018-09-12 ENCOUNTER — TELEPHONE (OUTPATIENT)
Dept: FAMILY MEDICINE | Facility: CLINIC | Age: 58
End: 2018-09-12

## 2018-09-21 ENCOUNTER — RADIANT APPOINTMENT (OUTPATIENT)
Dept: MAMMOGRAPHY | Facility: CLINIC | Age: 58
End: 2018-09-21
Attending: FAMILY MEDICINE
Payer: COMMERCIAL

## 2018-09-21 DIAGNOSIS — Z12.31 VISIT FOR SCREENING MAMMOGRAM: ICD-10-CM

## 2018-09-21 PROCEDURE — 77067 SCR MAMMO BI INCL CAD: CPT | Mod: TC

## 2018-09-26 DIAGNOSIS — J01.90 ACUTE SINUSITIS WITH SYMPTOMS > 10 DAYS: ICD-10-CM

## 2018-09-26 NOTE — TELEPHONE ENCOUNTER
Requested Prescriptions   Pending Prescriptions Disp Refills     doxycycline (VIBRAMYCIN) 100 MG capsule [Pharmacy Med Name: DOXYCYCLINE HYCLATE 100MG CAPSULES] 20 capsule 0     Sig: TAKE 1 CAPSULE(100 MG) BY MOUTH TWICE DAILY    There is no refill protocol information for this order        Last Written Prescription Date:  8/31/19  Last Fill Quantity: 20,  # refills: 0   Last office visit: 8/31/2018 with prescribing provider:  JAVON   Future Office Visit:

## 2018-09-27 NOTE — TELEPHONE ENCOUNTER
Routing refill request to provider for review/approval because:  Drug not on the [facility] refill protocol.    Dede Alexander RN, [facility]

## 2018-09-28 RX ORDER — DOXYCYCLINE 100 MG/1
100 CAPSULE ORAL 2 TIMES DAILY
Qty: 20 CAPSULE | Refills: 0 | Status: SHIPPED | OUTPATIENT
Start: 2018-09-28 | End: 2019-03-08

## 2018-11-11 ENCOUNTER — OFFICE VISIT (OUTPATIENT)
Dept: URGENT CARE | Facility: URGENT CARE | Age: 58
End: 2018-11-11
Payer: COMMERCIAL

## 2018-11-11 VITALS
SYSTOLIC BLOOD PRESSURE: 130 MMHG | DIASTOLIC BLOOD PRESSURE: 77 MMHG | WEIGHT: 120.8 LBS | RESPIRATION RATE: 16 BRPM | HEART RATE: 92 BPM | TEMPERATURE: 97.8 F | OXYGEN SATURATION: 96 % | BODY MASS INDEX: 22.23 KG/M2 | HEIGHT: 62 IN

## 2018-11-11 DIAGNOSIS — J32.9 CHRONIC SINUSITIS, UNSPECIFIED LOCATION: Primary | ICD-10-CM

## 2018-11-11 PROCEDURE — 99213 OFFICE O/P EST LOW 20 MIN: CPT | Performed by: FAMILY MEDICINE

## 2018-11-11 ASSESSMENT — PAIN SCALES - GENERAL: PAINLEVEL: SEVERE PAIN (6)

## 2018-11-11 NOTE — MR AVS SNAPSHOT
After Visit Summary   11/11/2018    Paco Medrano    MRN: [id number]           Patient Information     Date Of Birth          1960        Visit Information        Provider Department      11/11/2018 4:10 PM Darrick Hatfield MD [facility]        Today's Diagnoses     Chronic sinusitis, unspecified location    -  1      Care Instructions      Treating Chronic Sinusitis    The sinuses are hollow areas formed by the bones of the face. Sinuses make and drain mucus. This keeps the nasal passages clean and moist. When the sinuses become swollen (inflamed) or infected, the condition is called sinusitis. Symptoms may include:    Thick, discolored drainage from the nose    Nasal congestion    Pain and pressure around the eyes, nose, cheeks, or forehead    Headache    Cough    Thick mucus draining down the back of the throat (postnasal drainage)    Fever    Loss of smell  With chronic sinusitis, the symptoms last more than 12 weeks.     Ongoing prevention  It s important to treat the cause of a sinus problem. If you have allergies, talk with your doctor about treatment. Or ask about getting an evaluation by an allergy specialist. If you re exposed to nasal irritants such as sawdust, use a filter mask. If you smoke, ask your doctor for help with quitting. Smoke irritates the sinuses and can make your sinus problem worse. If you live with smokers, ask them to consider quitting or only smoking outdoors.   Medicine  Medicines for sinusitis may include:    Antibiotic medicines. You may need to take antibiotic medicine for a longer period. If bacteria aren't the cause, antibiotics won't help.    Inhaled corticosteroid medicine. Nasal sprays or drops with steroids are often prescribed.    Other medicines. You may need to take nasal sprays with antihistamines and decongestants, or saltwater (saline) sprays or drops. Your provider may also prescribe mucolytics or expectorants to loosen and  clear mucus.    Allergy shots (immunotherapy). If you have nasal allergies, shots may help reduce your sensitivity to allergens such as pollen, dust mites, or mold.   If your symptoms still do not get better, you may need more testing. This may include a CT scan of the sinuses.  Surgery  If other treatments don t solve the problem, you may need surgery. The type of surgery depends on what is causing your sinusitis. It also depends on which sinuses are involved. Your doctor will tell you more about your options. The types of surgery include:    Endoscopic surgery. This is often used to clear blockages. The sinuses can then heal on their own. During the surgery, the doctor uses a thin, lighted tube (endoscope). The doctor puts the endoscope into your nose to see into the sinuses. This surgery can be done without incisions on the face.    Open surgery. This is often used to clean out a sinus lining that is very damaged. It lets the doctor reach areas that an endoscope may not reach.  Date Last Reviewed: 10/1/2016    3707-6691 [employer]. [address] [place] [address]. All rights reserved. This information is not intended as a substitute for professional medical care. Always follow your healthcare professional's instructions.                Follow-ups after your visit        Who to contact     If you have questions or need follow up information about today's clinic visit or your schedule please contact [facility] directly at [phone].  Normal or non-critical lab and imaging results will be communicated to you by MyChart, letter or phone within 4 business days after the clinic has received the results. If you do not hear from us within 7 days, please contact the clinic through MyChart or phone. If you have a critical or abnormal lab result, we will notify you by phone as soon as possible.  Submit refill requests through [employer] Mobile or call your pharmacy and they will forward  "the refill request to us. Please allow 3 business days for your refill to be completed.          Additional Information About Your Visit        MyChart Information     Particle lets you send messages to your doctor, view your test results, renew your prescriptions, schedule appointments and more. To sign up, go to [email or web address] . Click on \"Log in\" on the left side of the screen, which will take you to the Welcome page. Then click on \"Sign up Now\" on the right side of the page.     You will be asked to enter the access code listed below, as well as some personal information. Please follow the directions to create your username and password.     Your access code is: [email or web address]  Expires: 2019  4:43 PM     Your access code will  in 90 days. If you need help or a new code, please call your [place] clinic or [phone].        Care EveryWhere ID     This is your Care EveryWhere ID. This could be used by other organizations to access your [place] medical records  [id number]        Your Vitals Were     Pulse Temperature Respirations Height Pulse Oximetry BMI (Body Mass Index)    92 97.8  F (36.6  C) (Oral) 16 5' 1.75\" (1.568 m) 96% 22.27 kg/m2       Blood Pressure from Last 3 Encounters:   18 130/77   18 130/84   18 131/84    Weight from Last 3 Encounters:   18 120 lb 12.8 oz (54.8 kg)   18 122 lb 12.8 oz (55.7 kg)   18 125 lb (56.7 kg)              Today, you had the following     No orders found for display         Today's Medication Changes          These changes are accurate as of 18  4:45 PM.  If you have any questions, ask your nurse or doctor.               Start taking these medicines.        Dose/Directions    amoxicillin-clavulanate 875-125 MG per tablet   Commonly known as:  AUGMENTIN   Used for:  Chronic sinusitis, unspecified location   Started by:  Darrick Hatfield MD        Dose:  1 tablet   Take 1 tablet by mouth 2 times daily   Quantity:  " 20 tablet   Refills:  0            Where to get your medicines      These medications were sent to [facility] - [place], [place] - [address] [place] AT [facility] [address]  [address] [place] [address]     Phone:  [phone]     amoxicillin-clavulanate 875-125 MG per tablet                Primary Care Provider Office Phone # Fax #    Rodney Desai [phone] [address]  [place] [address]        Equal Access to Services     [facility]: Hadii aad ku hadasho Soomaali, waaxda luqadaha, qaybta kaalmada adeegyada, waxay idiin hayaan adeeg kharash ladarshan . So [facility] [phone].    ATENCIÓN: Si habla español, tiene a carney disposición servicios gratuitos de asistencia lingüística. [facility] [phone].    We comply with applicable federal civil rights laws and [place] laws. We do not discriminate on the basis of race, color, national origin, age, disability, sex, sexual orientation, or gender identity.            Thank you!     Thank you for choosing [facility]  for your care. Our goal is always to provide you with excellent care. Hearing back from our patients is one way we can continue to improve our services. Please take a few minutes to complete the written survey that you may receive in the mail after your visit with us. Thank you!             Your Updated Medication List - Protect others around you: Learn how to safely use, store and throw away your medicines at www.disposemymeds.org.          This list is accurate as of 11/11/18  4:45 PM.  Always use your most recent med list.                   Brand Name Dispense Instructions for use Diagnosis    amoxicillin-clavulanate 875-125 MG per tablet    AUGMENTIN    20 tablet    Take 1 tablet by mouth 2 times daily    Chronic sinusitis, unspecified location       sertraline 50 MG tablet    ZOLOFT    30 tablet    Take 1 tablet (50 mg) by mouth At Bedtime for depression and  anxiety prevention.    Major depressive disorder, recurrent episode, mild (H), Anxiety       zolpidem 5 MG tablet    AMBIEN    30 tablet    Take 0.5-1 tablets (2.5-5 mg) by mouth nightly as needed for sleep (take right at bedtime)    Psychophysiological insomnia

## 2018-11-11 NOTE — PROGRESS NOTES
SUBJECTIVE:   Paco Medrano is a 58 year old female who presents to clinic today for the following health issues:      Chief Complaint   Patient presents with     Sinus Problem       Duration: several weeks    Description (location/character/radiation): nasal congestion, facial pressure, loss of smell     Intensity:  moderate    Accompanying signs and symptoms: none     History (similar episodes/previous evaluation): recurrent sinusitis     Precipitating or alleviating factors: None    Therapies tried and outcome: OTC analgesia, fluids         Problem list and histories reviewed & adjusted, as indicated.  Additional history: as documented    Patient Active Problem List   Diagnosis     CARDIOVASCULAR SCREENING; LDL GOAL LESS THAN 160     Advance care planning     Psychophysiological insomnia     Tobacco use disorder     History reviewed. No pertinent surgical history.    Social History   Substance Use Topics     Smoking status: Current Every Day Smoker     Start date: 1/9/2012     Smokeless tobacco: Never Used     Alcohol use 0.0 oz/week     0 Standard drinks or equivalent per week     Family History   Problem Relation Age of Onset     Glaucoma Mother      Hypertension Mother      Hypertension Father      Cancer Sister      Diabetes Daughter          Current Outpatient Prescriptions   Medication Sig Dispense Refill     sertraline (ZOLOFT) 50 MG tablet Take 1 tablet (50 mg) by mouth At Bedtime for depression and anxiety prevention. 30 tablet 1     zolpidem (AMBIEN) 5 MG tablet Take 0.5-1 tablets (2.5-5 mg) by mouth nightly as needed for sleep (take right at bedtime) 30 tablet 5     Allergies   Allergen Reactions     Azithromycin Nausea     Erythromycin      gi upset     Septra [Sulfamethoxazole W-Trimethoprim] Rash     Recent Labs   Lab Test 06/16/15   A1C  5.3   LDL  121   HDL  66   TRIG  99      BP Readings from Last 3 Encounters:   11/11/18 130/77   08/31/18 130/84   07/17/18 131/84    Wt Readings from Last 3  "Encounters:   11/11/18 120 lb 12.8 oz (54.8 kg)   08/31/18 122 lb 12.8 oz (55.7 kg)   07/17/18 125 lb (56.7 kg)                  Labs reviewed in EPIC    Reviewed and updated as needed this visit by clinical staff  Tobacco  Allergies  Meds  Med Hx  Surg Hx  Fam Hx  Soc Hx      Reviewed and updated as needed this visit by Provider         ROS:  Constitutional, HEENT, cardiovascular, pulmonary, gi and gu systems are negative, except as otherwise noted.    OBJECTIVE:     /77 (BP Location: Left arm, Patient Position: Sitting, Cuff Size: Adult Regular)  Pulse 92  Temp 97.8  F (36.6  C) (Oral)  Resp 16  Ht 5' 1.75\" (1.568 m)  Wt 120 lb 12.8 oz (54.8 kg)  SpO2 96%  BMI 22.27 kg/m2  Body mass index is 22.27 kg/(m^2).  GENERAL: healthy, alert and no distress  EYES: Eyes grossly normal to inspection, PERRL and conjunctivae and sclerae normal  HENT: normal cephalic/atraumatic, ear canals and TM's normal, nasal mucosa edematous , oral mucous membranes moist and sinuses: not tender  NECK: no adenopathy, no asymmetry, masses, or scars and thyroid normal to palpation  RESP: lungs clear to auscultation - no rales, rhonchi or wheezes  CV: regular rate and rhythm, normal S1 S2, no S3 or S4, no murmur    ASSESSMENT/PLAN:         ICD-10-CM    1. Chronic sinusitis, unspecified location J32.9 amoxicillin-clavulanate (AUGMENTIN) 875-125 MG per tablet       Discussed in detail differentials and further management. Symptoms are likely secondary to chronic sinusitis. Augmentin prescribed as per patient desire, patient deferred ENT referral.  Recommended well hydration, over-the-counter analgesia, warm fluids and saline irrigation. Written instructions/information provided. Patient understood and in agreement with the above plan. All questions are answered.         Patient Instructions       Treating Chronic Sinusitis    The sinuses are hollow areas formed by the bones of the face. Sinuses make and drain mucus. This keeps " the nasal passages clean and moist. When the sinuses become swollen (inflamed) or infected, the condition is called sinusitis. Symptoms may include:    Thick, discolored drainage from the nose    Nasal congestion    Pain and pressure around the eyes, nose, cheeks, or forehead    Headache    Cough    Thick mucus draining down the back of the throat (postnasal drainage)    Fever    Loss of smell  With chronic sinusitis, the symptoms last more than 12 weeks.     Ongoing prevention  It s important to treat the cause of a sinus problem. If you have allergies, talk with your doctor about treatment. Or ask about getting an evaluation by an allergy specialist. If you re exposed to nasal irritants such as sawdust, use a filter mask. If you smoke, ask your doctor for help with quitting. Smoke irritates the sinuses and can make your sinus problem worse. If you live with smokers, ask them to consider quitting or only smoking outdoors.   Medicine  Medicines for sinusitis may include:    Antibiotic medicines. You may need to take antibiotic medicine for a longer period. If bacteria aren't the cause, antibiotics won't help.    Inhaled corticosteroid medicine. Nasal sprays or drops with steroids are often prescribed.    Other medicines. You may need to take nasal sprays with antihistamines and decongestants, or saltwater (saline) sprays or drops. Your provider may also prescribe mucolytics or expectorants to loosen and clear mucus.    Allergy shots (immunotherapy). If you have nasal allergies, shots may help reduce your sensitivity to allergens such as pollen, dust mites, or mold.   If your symptoms still do not get better, you may need more testing. This may include a CT scan of the sinuses.  Surgery  If other treatments don t solve the problem, you may need surgery. The type of surgery depends on what is causing your sinusitis. It also depends on which sinuses are involved. Your doctor will tell you more about your options. The  types of surgery include:    Endoscopic surgery. This is often used to clear blockages. The sinuses can then heal on their own. During the surgery, the doctor uses a thin, lighted tube (endoscope). The doctor puts the endoscope into your nose to see into the sinuses. This surgery can be done without incisions on the face.    Open surgery. This is often used to clean out a sinus lining that is very damaged. It lets the doctor reach areas that an endoscope may not reach.  Date Last Reviewed: 10/1/2016    8187-5008 [employer]. [address] [place] [address]. All rights reserved. This information is not intended as a substitute for professional medical care. Always follow your healthcare professional's instructions.            Darrick Hatfield MD  [facility]

## 2018-11-11 NOTE — PATIENT INSTRUCTIONS
Treating Chronic Sinusitis    The sinuses are hollow areas formed by the bones of the face. Sinuses make and drain mucus. This keeps the nasal passages clean and moist. When the sinuses become swollen (inflamed) or infected, the condition is called sinusitis. Symptoms may include:    Thick, discolored drainage from the nose    Nasal congestion    Pain and pressure around the eyes, nose, cheeks, or forehead    Headache    Cough    Thick mucus draining down the back of the throat (postnasal drainage)    Fever    Loss of smell  With chronic sinusitis, the symptoms last more than 12 weeks.     Ongoing prevention  It s important to treat the cause of a sinus problem. If you have allergies, talk with your doctor about treatment. Or ask about getting an evaluation by an allergy specialist. If you re exposed to nasal irritants such as sawdust, use a filter mask. If you smoke, ask your doctor for help with quitting. Smoke irritates the sinuses and can make your sinus problem worse. If you live with smokers, ask them to consider quitting or only smoking outdoors.   Medicine  Medicines for sinusitis may include:    Antibiotic medicines. You may need to take antibiotic medicine for a longer period. If bacteria aren't the cause, antibiotics won't help.    Inhaled corticosteroid medicine. Nasal sprays or drops with steroids are often prescribed.    Other medicines. You may need to take nasal sprays with antihistamines and decongestants, or saltwater (saline) sprays or drops. Your provider may also prescribe mucolytics or expectorants to loosen and clear mucus.    Allergy shots (immunotherapy). If you have nasal allergies, shots may help reduce your sensitivity to allergens such as pollen, dust mites, or mold.   If your symptoms still do not get better, you may need more testing. This may include a CT scan of the sinuses.  Surgery  If other treatments don t solve the problem, you may need surgery. The type of surgery depends on  what is causing your sinusitis. It also depends on which sinuses are involved. Your doctor will tell you more about your options. The types of surgery include:    Endoscopic surgery. This is often used to clear blockages. The sinuses can then heal on their own. During the surgery, the doctor uses a thin, lighted tube (endoscope). The doctor puts the endoscope into your nose to see into the sinuses. This surgery can be done without incisions on the face.    Open surgery. This is often used to clean out a sinus lining that is very damaged. It lets the doctor reach areas that an endoscope may not reach.  Date Last Reviewed: 10/1/2016    3896-4709 [employer]. [address] [place] [address], [place] [address]. All rights reserved. This information is not intended as a substitute for professional medical care. Always follow your healthcare professional's instructions.

## 2018-11-22 PROBLEM — F33.0 MAJOR DEPRESSIVE DISORDER, RECURRENT EPISODE, MILD (H): Status: ACTIVE | Noted: 2018-11-22

## 2018-11-22 PROBLEM — F41.9 ANXIETY: Status: ACTIVE | Noted: 2018-11-22

## 2018-11-29 ENCOUNTER — TELEPHONE (OUTPATIENT)
Dept: FAMILY MEDICINE | Facility: CLINIC | Age: 58
End: 2018-11-29

## 2018-11-29 NOTE — TELEPHONE ENCOUNTER
Panel Management Review        Last Office Visit with this department:     Fail List measure:     Depression / Dysthymia review    Measure:  Needs PHQ-9 score of 4 or less during index window.  Administer PHQ-9 and if score is 5 or more, send encounter to provider for next steps.    5 - 7 month window range:     INDEX 7/17/18  Follow-up START 11/17/18  Follow-up END 3/17/19    PHQ-9 SCORE 1/16/2015 7/20/2016 7/17/2018   PHQ-9 Total Score 12 - -   PHQ-9 Total Score - 4 11       If PHQ-9 recheck is 5 or more, route to provider for next steps.    Patient is due for:  PHQ9      Patient is due/failing the following:   PHQ9    Action needed:   Patient needs to do PHQ9.    Type of outreach:    Phone, left message for patient to call back.     Questions for provider review:    None                                                                                                                                    Frank Wang MA

## 2019-01-01 ENCOUNTER — OFFICE VISIT (OUTPATIENT)
Dept: URGENT CARE | Facility: URGENT CARE | Age: 59
End: 2019-01-01
Payer: COMMERCIAL

## 2019-01-01 ENCOUNTER — NURSE TRIAGE (OUTPATIENT)
Dept: NURSING | Facility: CLINIC | Age: 59
End: 2019-01-01

## 2019-01-01 VITALS
OXYGEN SATURATION: 98 % | HEART RATE: 82 BPM | WEIGHT: 122 LBS | BODY MASS INDEX: 22.5 KG/M2 | TEMPERATURE: 98.2 F | SYSTOLIC BLOOD PRESSURE: 140 MMHG | DIASTOLIC BLOOD PRESSURE: 80 MMHG | RESPIRATION RATE: 19 BRPM

## 2019-01-01 DIAGNOSIS — J02.9 SORE THROAT: ICD-10-CM

## 2019-01-01 DIAGNOSIS — J01.01 ACUTE RECURRENT MAXILLARY SINUSITIS: Primary | ICD-10-CM

## 2019-01-01 LAB
DEPRECATED S PYO AG THROAT QL EIA: NORMAL
SPECIMEN SOURCE: NORMAL

## 2019-01-01 PROCEDURE — 87880 STREP A ASSAY W/OPTIC: CPT | Performed by: PHYSICIAN ASSISTANT

## 2019-01-01 PROCEDURE — 87081 CULTURE SCREEN ONLY: CPT | Performed by: PHYSICIAN ASSISTANT

## 2019-01-01 PROCEDURE — 99214 OFFICE O/P EST MOD 30 MIN: CPT | Performed by: PHYSICIAN ASSISTANT

## 2019-01-01 RX ORDER — DOXYCYCLINE HYCLATE 100 MG
100 TABLET ORAL 2 TIMES DAILY
Qty: 28 TABLET | Refills: 0 | Status: SHIPPED | OUTPATIENT
Start: 2019-01-01 | End: 2019-01-01

## 2019-01-01 RX ORDER — CEFUROXIME AXETIL 500 MG/1
500 TABLET ORAL 2 TIMES DAILY
Qty: 20 TABLET | Refills: 0 | Status: SHIPPED | OUTPATIENT
Start: 2019-01-01 | End: 2019-03-08

## 2019-01-01 NOTE — PROGRESS NOTES
S: 58-year-old female here for sinus congestion left maxillary sinus pain and postnasal drip for 12 days.  Just got back from [place].  Also has severe sore throat since yesterday.  No nausea, vomiting or diarrhea.  No rash.  Chart reviewed and has had recurrent sinusitis.  It has been a long time since she has seen an ENT and surgery was recommended at that time.  She never followed through.  Has had some blood tinge when she blows her nose or coughs up the postnasal drip for 2 days.    Allergies   Allergen Reactions     Azithromycin Nausea     Erythromycin      gi upset     Septra [Sulfamethoxazole W-Trimethoprim] Rash       No past medical history on file.      Current Outpatient Medications on File Prior to Visit:  zolpidem (AMBIEN) 5 MG tablet Take 0.5-1 tablets (2.5-5 mg) by mouth nightly as needed for sleep (take right at bedtime)   amoxicillin-clavulanate (AUGMENTIN) 875-125 MG per tablet Take 1 tablet by mouth 2 times daily (Patient not taking: Reported on 1/1/2019)   sertraline (ZOLOFT) 50 MG tablet Take 1 tablet (50 mg) by mouth At Bedtime for depression and anxiety prevention. (Patient not taking: Reported on 1/1/2019)   sertraline (ZOLOFT) 50 MG tablet Take 1/2 tablet daily for the first week, then increase to 1 tablet daily.     No current facility-administered medications on file prior to visit.     Social History     Tobacco Use     Smoking status: Current Every Day Smoker     Start date: 1/9/2012     Smokeless tobacco: Never Used   Substance Use Topics     Alcohol use: Yes     Alcohol/week: 0.0 oz       ROS:  CONSTITUTIONAL: Negative for fatigue or fever.  EYES: Negative for eye problems.  ENT: As above.  RESP: As above.  CV: Negative for chest pains.  GI: Negative for vomiting.  MUSCULOSKELETAL:  Negative for significant muscle or joint pains.  NEUROLOGIC: Negative for headaches.  SKIN: Negative for rash.    OBJECTIVE:  /80 (BP Location: Left arm, Patient Position: Chair, Cuff Size: Adult  Regular)   Pulse 82   Temp 98.2  F (36.8  C) (Oral)   Resp 19   Wt 55.3 kg (122 lb)   SpO2 98%   BMI 22.50 kg/m    GENERAL APPEARANCE: Healthy, alert and no distress.  EYES:Conjunctiva/sclera clear.  EARS: No cerumen.   Ear canals w/o erythema.  TM's intact w/o erythema.    NOSE/MOUTH: nasal turbinates red and inflamed and friable  SINUSES: Moderate to severe left  maxillary sinus tenderness.  THROAT: Mild  erythema w/o tonsillar enlargement . No exudates.  NECK: Supple, nontender, no lymphadenopathy.  RESP: Lungs clear to auscultation - no rales, rhonchi or wheezes  CV: Regular rate and rhythm, normal S1 S2, no murmur noted.  NEURO: Awake, alert    SKIN: No rashes    Results for orders placed or performed in visit on 01/01/19   Rapid strep screen   Result Value Ref Range    Specimen Description Throat     Rapid Strep A Screen       NEGATIVE: No Group A streptococcal antigen detected by immunoassay, await culture report.         ASSESSMENT:     ICD-10-CM    1. Acute recurrent maxillary sinusitis J01.01 doxycycline hyclate (VIBRA-TABS) 100 MG tablet     OTOLARYNGOLOGY REFERRAL   2. Sore throat J02.9 Rapid strep screen         PLAN: Needs to see ENT- really stressed the importance as she repeatedly comes to Urgent Care  Lots of rest and fluids.  RTC if any worsening symptoms or if not improving.    Jaylyn Amezquita PA-C

## 2019-01-02 LAB
BACTERIA SPEC CULT: NORMAL
SPECIMEN SOURCE: NORMAL

## 2019-01-02 NOTE — TELEPHONE ENCOUNTER
Reason for Call/Nurse Assessment:  Patient received a message from  staff today s/p her urgent care visit today, questioning if it was about her strep test results. This nurse reviewed the results below:    Exam Information     Exam Date Exam Time Accession # Results    1/1/19  5:03 PM [id number]    Specimen Information: Throat        Component Collected Lab   Specimen Description 01/01/2019  5:03 PM BK   Throat    Rapid Strep A Screen 01/01/2019  5:03 PM BK   NEGATIVE: No Group A streptococcal antigen detected by immunoassay, await culture report.      RN Action/Disposition:  Caller had no further questions. Encouraged call back to [facility] 24/7 for new/worsening symptoms or further questions.    Cathleen Bennett RN  [facility]    Reason for Disposition    Caller requesting lab results     Seen in  today    Protocols used: [facility]CP CALL - [facility] TRIAGE[facility]

## 2019-01-09 ENCOUNTER — TELEPHONE (OUTPATIENT)
Dept: FAMILY MEDICINE | Facility: CLINIC | Age: 59
End: 2019-01-09

## 2019-01-09 DIAGNOSIS — F51.04 PSYCHOPHYSIOLOGICAL INSOMNIA: ICD-10-CM

## 2019-01-09 NOTE — TELEPHONE ENCOUNTER
ambien 5mg tablets      Last Written Prescription Date:  7/10/2018  Last Fill Quantity: 30,   # refills: 5  Last Office Visit: 7/17/18  Future Office visit:       Routing refill request to provider for review/approval because:  Drug not on the [facility], [facility] or [facility] refill protocol or controlled substance  Thank you,  Dee Hewitt, Pharmacy Intern  [facility]

## 2019-01-11 RX ORDER — ZOLPIDEM TARTRATE 5 MG/1
2.5-5 TABLET ORAL
Qty: 30 TABLET | Refills: 5 | Status: SHIPPED | OUTPATIENT
Start: 2019-01-11 | End: 2019-05-13

## 2019-01-14 NOTE — TELEPHONE ENCOUNTER
Provider walked Rx down to pharmacy.      This writer attempted to contact patient on 01/14/19      Reason for call informed Rx refilled and left detailed message.      If patient calls back:   Relay message, (read verbatim), document that pt called and close encounter        Frank Wang MA

## 2019-03-03 ENCOUNTER — NURSE TRIAGE (OUTPATIENT)
Dept: NURSING | Facility: CLINIC | Age: 59
End: 2019-03-03

## 2019-03-03 NOTE — TELEPHONE ENCOUNTER
"Patient reports some dizziness, a bit of nausea, achy shoulder and sore stiff neck, headache, and pressure behind her eyes.  Patient reports feeling cold/hot but does not have a thermometer.  Current pain level is \"tolerable,\" at 5/10.  Patient reports the pressure behind her eye has bee there for 6 days.  Reviewed guideline and care advice with caller to be seen within 3 days.  Caller verbalizes understanding.      Reason for Disposition    [1] Sinus congestion (pressure, fullness) AND [2] present > 10 days    Additional Information    Negative: Severe difficulty breathing (e.g., struggling for each breath, speaks in single words)    Negative: Sounds like a life-threatening emergency to the triager    Negative: [1] Sinus infection AND [2] taking an antibiotic AND [3] symptoms continue    Negative: [1] Difficulty breathing AND [2] not from stuffy nose (e.g., not relieved by cleaning out the nose)    Negative: [1] SEVERE headache AND [2] fever    Negative: [1] Redness or swelling on the cheek, forehead or around the eye AND [2] fever    Negative: Fever > 104 F (40 C)    Negative: Patient sounds very sick or weak to the triager    Negative: [1] SEVERE pain AND [2] not improved 2 hours after pain medicine    Negative: [1] Fever > 101 F (38.3 C) AND [2] age > 60    Negative: [1] Fever > 101 F (38.3 C) AND [2] bedridden (e.g., nursing home patient, CVA, chronic illness, recovering from surgery)    Negative: [1] Fever > 100.5 F (38.1 C) AND [2] diabetes mellitus or weak immune system (e.g., HIV positive, cancer chemo, splenectomy, organ transplant, chronic steroids)    Negative: Fever present > 3 days (72 hours)    Negative: [1] Fever returns after gone for over 24 hours AND [2] symptoms worse or not improved    Negative: Earache    Negative: [1] Redness or swelling on the cheek, forehead or around the eye AND [2] no fever    Protocols used: SINUS PAIN OR CONGESTION-ADULT-      "

## 2019-03-08 ENCOUNTER — OFFICE VISIT (OUTPATIENT)
Dept: FAMILY MEDICINE | Facility: CLINIC | Age: 59
End: 2019-03-08
Payer: COMMERCIAL

## 2019-03-08 VITALS
DIASTOLIC BLOOD PRESSURE: 78 MMHG | HEIGHT: 62 IN | HEART RATE: 89 BPM | TEMPERATURE: 98.1 F | BODY MASS INDEX: 21.9 KG/M2 | SYSTOLIC BLOOD PRESSURE: 133 MMHG | WEIGHT: 119 LBS | OXYGEN SATURATION: 100 %

## 2019-03-08 DIAGNOSIS — J01.01 ACUTE RECURRENT MAXILLARY SINUSITIS: ICD-10-CM

## 2019-03-08 DIAGNOSIS — H83.09 LABYRINTHITIS, UNSPECIFIED LATERALITY: ICD-10-CM

## 2019-03-08 DIAGNOSIS — J32.9 RECURRENT SINUSITIS: Primary | ICD-10-CM

## 2019-03-08 PROCEDURE — 99214 OFFICE O/P EST MOD 30 MIN: CPT | Performed by: FAMILY MEDICINE

## 2019-03-08 RX ORDER — CEFUROXIME AXETIL 500 MG/1
500 TABLET ORAL 2 TIMES DAILY
Qty: 20 TABLET | Refills: 0 | Status: SHIPPED | OUTPATIENT
Start: 2019-03-08 | End: 2020-04-16

## 2019-03-08 ASSESSMENT — ANXIETY QUESTIONNAIRES
2. NOT BEING ABLE TO STOP OR CONTROL WORRYING: NOT AT ALL
6. BECOMING EASILY ANNOYED OR IRRITABLE: NOT AT ALL
3. WORRYING TOO MUCH ABOUT DIFFERENT THINGS: SEVERAL DAYS
1. FEELING NERVOUS, ANXIOUS, OR ON EDGE: SEVERAL DAYS
IF YOU CHECKED OFF ANY PROBLEMS ON THIS QUESTIONNAIRE, HOW DIFFICULT HAVE THESE PROBLEMS MADE IT FOR YOU TO DO YOUR WORK, TAKE CARE OF THINGS AT HOME, OR GET ALONG WITH OTHER PEOPLE: NOT DIFFICULT AT ALL
7. FEELING AFRAID AS IF SOMETHING AWFUL MIGHT HAPPEN: NOT AT ALL
5. BEING SO RESTLESS THAT IT IS HARD TO SIT STILL: NOT AT ALL
GAD7 TOTAL SCORE: 2

## 2019-03-08 ASSESSMENT — MIFFLIN-ST. JEOR: SCORE: 1064.06

## 2019-03-08 ASSESSMENT — PATIENT HEALTH QUESTIONNAIRE - PHQ9
SUM OF ALL RESPONSES TO PHQ QUESTIONS 1-9: 5
5. POOR APPETITE OR OVEREATING: NOT AT ALL

## 2019-03-08 ASSESSMENT — PAIN SCALES - GENERAL: PAINLEVEL: NO PAIN (0)

## 2019-03-08 NOTE — PATIENT INSTRUCTIONS
================================================================================  Normal Values   Blood pressure  <140/90 for most adults    <130/80 for some chronic diseases (ask your care team about yours)    BMI (body mass index)  18.5-25 kg/m2 (based on height and weight)     Thank you for visiting [facility]    Normal or non-critical lab and imaging results will be communicated to you by MyChart, letter or phone within 7 days.  If you do not hear from us within 10 days, please call the clinic. If you have a critical or abnormal lab result, we will notify you by phone as soon as possible.     If you have any questions regarding your visit please contact:     Team Comfort:   Clinic Hours Telephone Number   Dr. Rodney Leigh Dr. [place] 7am-5pm  Monday - Friday [phone]  Markell RN  Celsa Carl RN   Pharmacy 8:00am-8pm Monday-Friday    9am-5pm Saturday-Sunday [phone]   Urgent Care 11am-9pm Monday-Friday        9am-5pm Saturday-Sunday [phone]     After hours, weekend or if you need to make an appointment with your primary provider please call [phone].   After Hours nurse advise: call [facility]: [phone]    Medication Refills:  Call your pharmacy and they will forward the refill to us. Please allow 3 business days for your refills to be completed.          Patient Education     Labyrinthitis    The inner ear is located behind the middle ear. It is part of the balance center of your body. When the inner ear becomes irritated or inflamed it causes a condition known as labyrinthitis. It may due to a viral infection, but often a cause is not found. Labyrinthitis causes sudden dizziness and balance problems. It often causes a feeling that you or the room is spinning (vertigo). You may feel nauseated or throw up. You may also feel a loss of balance when trying to walk. Head movement from side to side or changes in  body position (from lying to sitting or standing) may worsen symptoms. You may have ringing in the ear. Hearing may also be affected.  An episode of labyrinthitis may last seconds, minutes, or hours. It may never return. Or symptoms may recur off and on over several weeks or longer. In many cases, the problem is short-term and goes away when the inner ear issue resolves.  Home care    Take medicine as prescribed to relieve your symptoms. Unless another medicine was prescribed, you can try over-the-counter motion sickness pills. Note that these medicines may cause drowsiness.    If symptoms are severe, rest quietly in bed. Change positions slowly. There may be 1 position feels best, such as lying on 1 side or lying on your back with your head slightly raised on pillows. Until you have no symptoms, you are at a higher risk of falling. Let someone help you when you get up. Get rid of home hazards such as loose electrical cords and throw rugs. Don t walk in unfamiliar areas that are not lighted. Use night lights in bathrooms and kitchen areas.    Vestibular rehabilitation exercises are done by moving your head to help fix problems in the inner ear. If these exercises have been prescribed, do them as you have been instructed.    Do not drive or work with dangerous machinery until 1 week has passed without symptoms. Be careful when using stairs.  Follow-up care  Follow up with your healthcare provider or as advised by our staff.  When to seek medical advice  Call your healthcare provider for any of the following:    Symptoms that are not controlled by medicine     Symptoms that get worse    Repeated vomiting that is not relieved by medicine    Weakness that gets worse    Fainting    Headache or unusual drowsiness    Trouble with vision or speech    Trouble moving your face, arms, or legs    Hearing loss    Symptoms that last more than a few days  Date Last Reviewed: 11/1/2017 2000-2018 [employer] [address] [id number]. All rights reserved. This information is not intended as a substitute for professional medical care. Always follow your healthcare professional's instructions.           Patient Education     Labyrinthitis    Labyrinthitis is the inflammation of part of the inner ear called the labyrinth. It usually affects only one ear. A nerve in the head called the 8th cranial nerve may also be inflamed. Labyrinthitis causes a sense of spinning and hearing loss. In most people these go away over time.  Understanding the inner ear  The inner ear has a system of fluid-filled tubes and sacs. This system is called the labyrinth. Inside the inner ear, the cochlea senses sound. The vestibular organs take in sense motion and changes in space. These create your sense of balance. The 8th cranial nerve (vestibulocochlear nerve) sends all of this information from the inner ear to the brain.  When 1 of the nerves or the labyrinth is infected, it can become inflamed. This can cause it to not work normally. It may cause hearing loss in 1 ear. The brain now has to make sense of the information that doesn't match between the normal nerve and the infected one. This causes a feeling that the world is spinning around you (vertigo).  What causes labyrinthitis?  A viral infection may cause the condition. The virus may have spread throughout your body. Or it may only affect the labyrinth and 8th cranial nerve. Usually only 1 nerve is affected. Viruses that can cause labyrinthitis include:    Herpes viruses    Influenza    Measles    Mumps    Rubella    Polio    Hepatitis    Kelvin-Ponce    Varicella  Chronic bacterial infections of the middle ear can spread to the inner ear and cause labyrinthitis. In rare cases bacterial meningitis or head trauma may cause labyrinthitis. In other cases the cause of labyrinthitis is not known.  Symptoms of labyrinthitis  Symptoms of labyrinthitis of may include:    A feeling of  spinning (vertigo)    Dizziness    Lack of balance when walking    Nausea and vomiting    Trouble concentrating    Back-and-forth eye movements that you can't control (nystagmus)    Hearing loss    Ringing in the ears  Symptoms may range from mild to severe. Severe symptoms such as vertigo can cause problems with standing and walking. Symptoms may come on very quickly and start during routine daily activities. Or you may start to have symptoms when you wake up in the morning. In many people, these symptoms go away over several weeks. Or they can last longer.  Diagnosing labyrinthitis  Your healthcare provider will ask about your past health. You may also have a physical exam. This may include hearing and balance tests. It will also include an exam of your nervous system. There are no tests for labyrinthitis. But your healthcare provider may have you take an imaging test. Many health conditions can cause dizziness and vertigo. Your healthcare provider will need to make sure you don't have another condition that causes these symptoms, such as stroke.  You may have tests such as:    MRI, to check for a stroke    Electrocardiogram (ECG), to check for cardiovascular causes    Electronystagmography (ENG) or videonystagmography (VNG). Either of these records your eye movement to find where the problem is in your vestibular system. These tests can find the cause of a balance disorder.  Treatment for labyrinthitis  Treatment depends on your overall health and symptoms. Treatment for labyrinthitis may include:    Corticosteroids to help reduce inflammation of the nerve    Antiviral medicines    Antibiotics if you have signs of a bacterial infection    Medicines to take for a short time that control nausea and dizziness, such as diphenhydramine or lorazepam  If your symptoms go away in a few weeks, you likely won't need other treatment. If you have symptoms that don't go away, you may need to do certain exercises. These are  known as vestibular rehabilitation exercises. They are a form of physical therapy. These exercises may help your brain learn to adjust to the vestibular imbalance. A physical therapist will teach you the exercises. Then you can do them at home.  Possible complications of labyrinthitis  In most cases labyrinthitis does not cause any complications. In rare cases it may permanently damage the 8th cranial nerve. This can cause lasting problems with balance. It can also cause partial or total loss of hearing. You may need to use a hearing aid. Getting treated right away can help reduce your risk for these problems.     When to call the healthcare provider  Call your healthcare provider right away if you have any of these:    Symptoms that get worse, or don't get better with treatment    New symptoms    Date Last Reviewed: 11/1/2017 2000-2018 [employer]. [address] [place] [address], [place] [address]. All rights reserved. This information is not intended as a substitute for professional medical care. Always follow your healthcare professional's instructions.

## 2019-03-08 NOTE — PROGRESS NOTES
"  SUBJECTIVE:   Paco Medrano is a 59 year old female who presents to clinic today for the following health issues:      ENT Symptoms             Symptoms: cc Present Absent Comment   Fever/Chills  x     Fatigue  x     Muscle Aches  x     Eye Irritation  x  Pressure   Sneezing   x    Nasal Tyrel/Drg  x     Sinus Pressure/Pain  x  LT maxillary   Loss of smell  x     Dental pain   x    Sore Throat   x    Swollen Glands   x    Ear Pain/Fullness   x    Cough   x    Wheeze   x    Chest Pain   x    Shortness of breath   x    Rash   x    Other         Symptom duration:  2/19/19   Symptom severity:  moderate   Treatments tried:  Advil Cold & SINUS   Contacts:         Medications updated and reviewed.  Past, family and surgical history is updated and reviewed in the record.    ROS:  Other than noted above, general, HEENT, respiratory, cardiac and gastrointestinal systems are negative.      OBJECTIVE:                                                    /78 (BP Location: Left arm, Patient Position: Chair, Cuff Size: Adult Regular)   Pulse 89   Temp 98.1  F (36.7  C) (Oral)   Ht 1.568 m (5' 1.75\")   Wt 54 kg (119 lb)   SpO2 100%   BMI 21.94 kg/m     Body mass index is 21.94 kg/m .   GENERAL APPEARANCE ADULT: Alert, no acute distress  EYES: PERRL, EOM normal, conjunctiva and lids normal  HENT: right TM normal, left TM normal, nose purulent rhinorrhea right, frontal sinus tenderness bilateral, maxillary sinus tenderness bilateral, throat/mouth:normal, mucous membranes moist  NECK: No adenopathy,masses or thyromegaly  RESP: lungs clear to auscultation   CV: normal rate, regular rhythm, no murmur or gallop  SKIN: no suspicious lesions or rashes  NEURO: Alert, oriented, speech and mentation normal  PSYCH: mentation appears normal., affect and mood normal       ASSESSMENT/PLAN:                                                      (J32.9) Recurrent sinusitis  (primary encounter diagnosis)  Comment: She notes an increase " in frequency lately, and she expresses some interest in meeting a surgeon to discuss options.  Plan: OTOLARYNGOLOGY REFERRAL          (J01.01) Acute recurrent maxillary sinusitis  Comment:   Plan: cefuroxime (CEFTIN) 500 MG tablet        Return in about 2 weeks (around 3/22/2019) for recheck if sinus symptoms not resolved by then.     (H83.09) Labyrinthitis, unspecified laterality  Comment:   Plan: Handouts provided.    Rodney Desai MD

## 2019-03-09 ASSESSMENT — ANXIETY QUESTIONNAIRES: GAD7 TOTAL SCORE: 2

## 2019-03-26 ENCOUNTER — TELEPHONE (OUTPATIENT)
Dept: FAMILY MEDICINE | Facility: CLINIC | Age: 59
End: 2019-03-26

## 2019-03-26 DIAGNOSIS — J01.01 ACUTE RECURRENT MAXILLARY SINUSITIS: ICD-10-CM

## 2019-03-26 NOTE — TELEPHONE ENCOUNTER
Requested Prescriptions   Pending Prescriptions Disp Refills     cefuroxime (CEFTIN) 500 MG tablet [Pharmacy Med Name: CEFUROXIME 500MG TABLETS] 20 tablet 0     Sig: TAKE 1 TABLET(500 MG) BY MOUTH TWICE DAILY FOR 10 DAYS FOR SINUS INFECTION    There is no refill protocol information for this order        Last Written Prescription Date:  3/8/19  Last Fill Quantity: 20,  # refills: 0   Last Office Visit with [facility] or [facility] prescribing provider:  3/8/19   Future Office Visit:

## 2019-03-27 NOTE — TELEPHONE ENCOUNTER
Team, please call and triage if necessary. May have been automated refill request from pharmacy? Patient requesting refill on Abx. Seen by PCP on 3/8/19 for acute visit.  1.01) Acute recurrent maxillary sinusitis  Comment:   Plan: cefuroxime (CEFTIN) 500 MG tablet        Return in about 2 weeks (around 3/22/2019) for recheck if sinus symptoms not resolved by then.     Trinity Lake RN

## 2019-03-28 RX ORDER — CEFUROXIME AXETIL 500 MG/1
TABLET ORAL
Qty: 20 TABLET | Refills: 0 | OUTPATIENT
Start: 2019-03-28

## 2019-03-28 NOTE — TELEPHONE ENCOUNTER
Called and spoke with pharmacist. Inquired if automated refill or patient requested. Per pharmacist, is patient initiated. 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 Did mention to pharmacist patient should RTC if continued symptoms. Was last seen 3/8/19. Per provider plan:  (J01.01) Acute recurrent maxillary sinusitis  Comment:   Plan: cefuroxime (CEFTIN) 500 MG tablet        Return in about 2 weeks (around 3/22/2019) for recheck if sinus symptoms not resolved by then.   Pharmacist agreed, will deny refill request and will inform patient to be seen. Writer agreed.    Trinity Lake RN

## 2019-05-08 DIAGNOSIS — F51.04 PSYCHOPHYSIOLOGICAL INSOMNIA: ICD-10-CM

## 2019-05-08 RX ORDER — ZOLPIDEM TARTRATE 5 MG/1
2.5-5 TABLET ORAL
Qty: 30 TABLET | Refills: 5
Start: 2019-05-08

## 2019-05-08 NOTE — TELEPHONE ENCOUNTER
30 day supply with 5 refills sent on 1/11/19. Should have refills on file at pharmacy. Too soon to refill.         Macy Crabtree RN, BSN, PHN

## 2019-05-11 DIAGNOSIS — F51.04 PSYCHOPHYSIOLOGICAL INSOMNIA: ICD-10-CM

## 2019-05-11 NOTE — TELEPHONE ENCOUNTER
Requested Prescriptions   Pending Prescriptions Disp Refills     zolpidem (AMBIEN) 5 MG tablet            Last Written Prescription Date:  1/11/19  Last Fill Quantity: 30,   # refills: 5  Last Office Visit: 3/8/19  Future Office visit:       Routing refill request to provider for review/approval because:  Drug not on the [facility], [facility] or [facility] refill protocol or controlled substance   30 tablet 5     Sig: Take 0.5-1 tablets (2.5-5 mg) by mouth nightly as needed for sleep (take right at bedtime)       There is no refill protocol information for this order              Irwin Faarax  [facility]

## 2019-05-11 NOTE — TELEPHONE ENCOUNTER
Zolpidem 5mg      Last Written Prescription Date:  01/11/19  Last Fill Quantity: 30,   # refills: 5  Last Office Visit: 03/08/19  Future Office visit:       Although this does have remaining refills this script was transferred to [facility] ([phone]) in [place] per patient's request in March 2019. Since this is a scheduled drug we can't transfer more than once so we aren't able to transfer it back to [facility] to fill the remaining refills.    We will need a new script to fill this medication, you can call the [facility] @ [phone] and void the remaining refills when a new script is written if needed.     Contact the [facility] in [place] with questions.      Thank You,  Karlee Mcclure, Pharmacy Tech  [place]

## 2019-05-13 RX ORDER — ZOLPIDEM TARTRATE 5 MG/1
2.5-5 TABLET ORAL
Qty: 30 TABLET | Refills: 1 | Status: SHIPPED | OUTPATIENT
Start: 2019-05-13 | End: 2019-07-02

## 2019-05-13 NOTE — TELEPHONE ENCOUNTER
Written rx will be deliver to the pharmacy this afternoon.  Our pharmacy will notify pt when ready for pickup.  Noe Leonard,  [facility]

## 2019-05-13 NOTE — TELEPHONE ENCOUNTER
Routing refill request to provider for review/approval because:  Drug not on the [facility] refill protocol   **See Pharmacy message below regarding need for refill    Trinity Lake RN

## 2019-06-08 ENCOUNTER — OFFICE VISIT (OUTPATIENT)
Dept: URGENT CARE | Facility: URGENT CARE | Age: 59
End: 2019-06-08
Payer: COMMERCIAL

## 2019-06-08 VITALS
OXYGEN SATURATION: 99 % | BODY MASS INDEX: 21.2 KG/M2 | WEIGHT: 115 LBS | RESPIRATION RATE: 12 BRPM | SYSTOLIC BLOOD PRESSURE: 125 MMHG | HEART RATE: 85 BPM | DIASTOLIC BLOOD PRESSURE: 82 MMHG | TEMPERATURE: 98.5 F

## 2019-06-08 DIAGNOSIS — J01.01 ACUTE RECURRENT MAXILLARY SINUSITIS: Primary | ICD-10-CM

## 2019-06-08 DIAGNOSIS — R07.0 THROAT PAIN: ICD-10-CM

## 2019-06-08 LAB
DEPRECATED S PYO AG THROAT QL EIA: NORMAL
SPECIMEN SOURCE: NORMAL

## 2019-06-08 PROCEDURE — 87880 STREP A ASSAY W/OPTIC: CPT | Performed by: NURSE PRACTITIONER

## 2019-06-08 PROCEDURE — 87081 CULTURE SCREEN ONLY: CPT | Performed by: NURSE PRACTITIONER

## 2019-06-08 PROCEDURE — 99213 OFFICE O/P EST LOW 20 MIN: CPT | Performed by: NURSE PRACTITIONER

## 2019-06-08 RX ORDER — IBUPROFEN 200 MG
200 TABLET ORAL EVERY 4 HOURS PRN
COMMUNITY
End: 2020-10-08 | Stop reason: ALTCHOICE

## 2019-06-08 RX ORDER — CEFUROXIME AXETIL 500 MG/1
500 TABLET ORAL 2 TIMES DAILY
Qty: 20 TABLET | Refills: 0 | Status: SHIPPED | OUTPATIENT
Start: 2019-06-08 | End: 2019-06-18

## 2019-06-08 RX ORDER — LORATADINE 10 MG/1
10 TABLET ORAL DAILY
COMMUNITY
End: 2024-04-11

## 2019-06-08 ASSESSMENT — ENCOUNTER SYMPTOMS
SINUS PAIN: 1
SORE THROAT: 1
DIARRHEA: 0
COUGH: 0
WHEEZING: 0
NAUSEA: 0
SINUS PRESSURE: 1
APPETITE CHANGE: 1
DYSURIA: 0
FATIGUE: 1
CHILLS: 1
MYALGIAS: 1
HEADACHES: 1
ACTIVITY CHANGE: 1
SHORTNESS OF BREATH: 0
VOICE CHANGE: 1
VOMITING: 0
FEVER: 1

## 2019-06-08 NOTE — PROGRESS NOTES
SUBJECTIVE:   Paco Medrano is a 59 year old female presenting with a chief complaint of   Chief Complaint   Patient presents with     Sinus Problem     x1 month- pressure behind eyes, fatigue, pain in neck/shoulders, discolored nasal drainage     Throat Problem     Dry throat x1 week, losing voice      Headache       She is an established patient of [facility].    URI Adult    Onset of symptoms was 1 MONTH ago.  Course of illness is worsening throat, sinus pressure and left ear over past 1 week  Severity 9/10  Current and Associated symptoms: fever, chills, ear pain left, sore throat, facial pain/pressure, headache, body aches and fatigue  Treatment measures tried include Benadryl, Advil cold/sinus and Claritin  Predisposing factors include HX of chronic sinusitis, smoker, seasonal allergies   Reports increased fatigue, decreased appetite, and disruption in sleep due to headache/bodyaches however no changes in bowel or bladder habits.     Review of Systems   Constitutional: Positive for activity change, appetite change, chills, fatigue and fever.   HENT: Positive for congestion, ear pain, sinus pressure, sinus pain, sore throat and voice change.    Respiratory: Negative for cough, shortness of breath and wheezing.    Gastrointestinal: Negative for diarrhea, nausea and vomiting.   Genitourinary: Negative for dysuria.   Musculoskeletal: Positive for myalgias.   Neurological: Positive for headaches.   All other systems reviewed and are negative.      History reviewed. No pertinent past medical history.  Family History   Problem Relation Age of Onset     Glaucoma Mother      Hypertension Mother      Hypertension Father      Cancer Sister      Diabetes Daughter      Current Outpatient Medications   Medication Sig Dispense Refill     cefuroxime (CEFTIN) 500 MG tablet Take 1 tablet (500 mg) by mouth 2 times daily for 10 days 20 tablet 0     ibuprofen (ADVIL/MOTRIN) 200 MG tablet Take 200 mg by mouth every 4 hours as  needed for mild pain       loratadine (CLARITIN) 10 MG tablet Take 10 mg by mouth daily       zolpidem (AMBIEN) 5 MG tablet Take 0.5-1 tablets (2.5-5 mg) by mouth nightly as needed for sleep (take right at bedtime) 30 tablet 1     sertraline (ZOLOFT) 50 MG tablet Take 1 tablet (50 mg) by mouth At Bedtime for depression and anxiety prevention. (Patient not taking: Reported on 6/8/2019) 30 tablet 1     Social History     Tobacco Use     Smoking status: Current Every Day Smoker     Start date: 1/9/2012     Smokeless tobacco: Never Used   Substance Use Topics     Alcohol use: Yes     Alcohol/week: 0.0 oz       OBJECTIVE  /82   Pulse 85   Temp 98.5  F (36.9  C) (Oral)   Resp 12   Wt 52.2 kg (115 lb)   SpO2 99%   BMI 21.20 kg/m      Physical Exam   Constitutional: She is oriented to person, place, and time. No distress.   HENT:   Right Ear: External ear normal.   Left Ear: External ear normal.   Mouth/Throat: No oropharyngeal exudate.   Sinuses: LEFT maxillary sinus tenderness   Neck: Neck supple.   Cardiovascular: Normal rate, regular rhythm, normal heart sounds and intact distal pulses. Exam reveals no gallop and no friction rub.   No murmur heard.  Pulmonary/Chest: Effort normal and breath sounds normal. No respiratory distress. She has no wheezes.   Lymphadenopathy:     She has cervical adenopathy.   Neurological: She is alert and oriented to person, place, and time.   Skin: Skin is warm. Capillary refill takes less than 2 seconds. No rash noted.   Psychiatric: She has a normal mood and affect.       Labs:  Results for orders placed or performed in visit on 06/08/19 (from the past 24 hour(s))   Strep, Rapid Screen   Result Value Ref Range    Specimen Description Throat     Rapid Strep A Screen       NEGATIVE: No Group A streptococcal antigen detected by immunoassay, await culture report.         ASSESSMENT:    ICD-10-CM    1. Acute recurrent maxillary sinusitis J01.01 cefuroxime (CEFTIN) 500 MG tablet   2.  Throat pain R07.0 Strep, Rapid Screen     Beta strep group A culture        Medical Decision Making:    Differential Diagnosis:  URI Adult/Peds:  Sinusitis, Strep pharyngitis, Viral pharyngitis, Viral syndrome and Viral upper respiratory illness    Serious Comorbid Conditions:  Adult:  None    PLAN: Discussed with patient course of antibiotics to be taken with food as possible and need for completion for sinusitis infection. Increase water intake. Apply heat packs to sinuses for comfort and take OTC NSAIDs as needed for pain relief, use of saline nasal spray. Reviewed rapid strep results and culture process.     Consideration for OTC allergy medications such as Allegra, Claritin or Zyrtec discussed as well. Education provided. Discussed when to return for re-evaluation of persistent symptoms. Patient agreed to the plan of care with no further questions or concerns.     SHUBHAM Rodriguez, CNP      Patient Instructions     Patient Education     Sinusitis (Antibiotic Treatment)    The sinuses are air-filled spaces within the bones of the face. They connect to the inside of the nose. Sinusitis is an inflammation of the tissue that lines the sinuses. Sinusitis can occur during a cold. It can also happen due to allergies to pollens and other particles in the air. Sinusitis can cause symptoms of sinus congestion and a feeling of fullness. A sinus infection causes fever, headache, and facial pain. There is often green or yellow fluid draining from the nose or into the back of the throat (post-nasal drip). You have been given antibiotics to treat this condition.  Home care    Take the full course of antibiotics as instructed. Do not stop taking them, even when you feel better.    Drink plenty of water, hot tea, and other liquids. This may help thin nasal mucus. It also may help your sinuses drain fluids.    Heat may help soothe painful areas of your face. Use a towel soaked in hot water. Or,  the shower and  direct the warm spray onto your face. Using a vaporizer along with a menthol rub at night may also help soothe symptoms.     An expectorant with guaifenesin may help thin nasal mucus and help your sinuses drain fluids.    You can use an over-the-counter decongestant, unless a similar medicine was prescribed to you. Nasal sprays work the fastest. Use one that contains phenylephrine or oxymetazoline. First blow your nose gently. Then use the spray. Do not use these medicines more often than directed on the label. If you do, your symptoms may get worse. You may also take pills that contain pseudoephedrine. Don t use products that combine multiple medicines. This is because side effects may be increased. Read labels. You can also ask the pharmacist for help. (People with high blood pressure should not use decongestants. They can raise blood pressure.)    Over-the-counter antihistamines may help if allergies contributed to your sinusitis.      Do not use nasal rinses or irrigation during an acute sinus infection, unless your healthcare provider tells you to. Rinsing may spread the infection to other areas in your sinuses.    Use acetaminophen or ibuprofen to control pain, unless another pain medicine was prescribed to you. If you have chronic liver or kidney disease or ever had a stomach ulcer, talk with your healthcare provider before using these medicines. (Aspirin should never be taken by anyone under age 18 who is ill with a fever. It may cause severe liver damage.)    Don't smoke. This can make symptoms worse.  Follow-up care  Follow up with your healthcare provider or our staff if you are not better in 1 week.  When to seek medical advice  Call your healthcare provider if any of these occur:    Facial pain or headache that gets worse    Stiff neck    Unusual drowsiness or confusion    Swelling of your forehead or eyelids    Vision problems, such as blurred or double vision    Fever of 100.4 F (38 C) or higher, or  as directed by your healthcare provider    Seizure    Breathing problems    Symptoms don't go away in 10 days  Prevention  Here are steps you can take to help prevent an infection:    Keep good hand washing habits.    Don t have close contact with people who have sore throats, colds, or other upper respiratory infections.    Don t smoke, and stay away from secondhand smoke.    Stay up to date with of your vaccines.  Date Last Reviewed: 11/1/2017 2000-2018 [employer]. [address] [place] [address], [place] [address]. All rights reserved. This information is not intended as a substitute for professional medical care. Always follow your healthcare professional's instructions.

## 2019-06-08 NOTE — LETTER
June 9, 2019      Paco Medrano  [address] [place] [address]      Dear MsKenna,    The results of your recent lab tests were within normal limits-negative throat culture. Enclosed is a copy of these results.  If you have any further questions or problems, please contact our office at [phone].    Sincerely,        SHUBHAM Rodriguez CNP      Resulted Orders   Strep, Rapid Screen   Result Value Ref Range    Specimen Description Throat     Rapid Strep A Screen       NEGATIVE: No Group A streptococcal antigen detected by immunoassay, await culture report.   Beta strep group A culture   Result Value Ref Range    Specimen Description Throat     Culture Micro No beta hemolytic Streptococcus Group A isolated

## 2019-06-08 NOTE — PATIENT INSTRUCTIONS
Patient Education     Sinusitis (Antibiotic Treatment)    The sinuses are air-filled spaces within the bones of the face. They connect to the inside of the nose. Sinusitis is an inflammation of the tissue that lines the sinuses. Sinusitis can occur during a cold. It can also happen due to allergies to pollens and other particles in the air. Sinusitis can cause symptoms of sinus congestion and a feeling of fullness. A sinus infection causes fever, headache, and facial pain. There is often green or yellow fluid draining from the nose or into the back of the throat (post-nasal drip). You have been given antibiotics to treat this condition.  Home care    Take the full course of antibiotics as instructed. Do not stop taking them, even when you feel better.    Drink plenty of water, hot tea, and other liquids. This may help thin nasal mucus. It also may help your sinuses drain fluids.    Heat may help soothe painful areas of your face. Use a towel soaked in hot water. Or,  the shower and direct the warm spray onto your face. Using a vaporizer along with a menthol rub at night may also help soothe symptoms.     An expectorant with guaifenesin may help thin nasal mucus and help your sinuses drain fluids.    You can use an over-the-counter decongestant, unless a similar medicine was prescribed to you. Nasal sprays work the fastest. Use one that contains phenylephrine or oxymetazoline. First blow your nose gently. Then use the spray. Do not use these medicines more often than directed on the label. If you do, your symptoms may get worse. You may also take pills that contain pseudoephedrine. Don t use products that combine multiple medicines. This is because side effects may be increased. Read labels. You can also ask the pharmacist for help. (People with high blood pressure should not use decongestants. They can raise blood pressure.)    Over-the-counter antihistamines may help if allergies contributed to your  sinusitis.      Do not use nasal rinses or irrigation during an acute sinus infection, unless your healthcare provider tells you to. Rinsing may spread the infection to other areas in your sinuses.    Use acetaminophen or ibuprofen to control pain, unless another pain medicine was prescribed to you. If you have chronic liver or kidney disease or ever had a stomach ulcer, talk with your healthcare provider before using these medicines. (Aspirin should never be taken by anyone under age 18 who is ill with a fever. It may cause severe liver damage.)    Don't smoke. This can make symptoms worse.  Follow-up care  Follow up with your healthcare provider or our staff if you are not better in 1 week.  When to seek medical advice  Call your healthcare provider if any of these occur:    Facial pain or headache that gets worse    Stiff neck    Unusual drowsiness or confusion    Swelling of your forehead or eyelids    Vision problems, such as blurred or double vision    Fever of 100.4 F (38 C) or higher, or as directed by your healthcare provider    Seizure    Breathing problems    Symptoms don't go away in 10 days  Prevention  Here are steps you can take to help prevent an infection:    Keep good hand washing habits.    Don t have close contact with people who have sore throats, colds, or other upper respiratory infections.    Don t smoke, and stay away from secondhand smoke.    Stay up to date with of your vaccines.  Date Last Reviewed: 11/1/2017 2000-2018 [employer]. [address] [place] [address], [place] [address]. All rights reserved. This information is not intended as a substitute for professional medical care. Always follow your healthcare professional's instructions.

## 2019-06-09 LAB
BACTERIA SPEC CULT: NORMAL
SPECIMEN SOURCE: NORMAL

## 2019-07-01 DIAGNOSIS — F51.04 PSYCHOPHYSIOLOGICAL INSOMNIA: ICD-10-CM

## 2019-07-01 NOTE — TELEPHONE ENCOUNTER
Reason for Call:  Medication or medication refill:    Do you use a [facility]?  Name of the pharmacy and phone number for the current request:  [facility] [address] [place] [address] [address] [place] AT [facility]  & [id number]     Name of the medication requested:   zolpidem (AMBIEN) 5 MG tablet 2.5-5 mg, AT BEDTIME PRN         Other request: only have couple pills left asking if this can be filled before the holiday weekend.     Can we leave a detailed message on this number? YES    Phone number patient can be reached at: Home number on file [phone] (home)    Best Time: Any    Call taken on 7/1/2019 at 2:11 PM by Terri Palencia

## 2019-07-01 NOTE — TELEPHONE ENCOUNTER
Routing refill request to provider for review/approval because:  Drug not on the [facility] refill protocol   Charisse Serrano RN

## 2019-07-01 NOTE — TELEPHONE ENCOUNTER
Requested Prescriptions   Pending Prescriptions Disp Refills     zolpidem (AMBIEN) 5 MG tablet 30 tablet 1     Sig: Take 0.5-1 tablets (2.5-5 mg) by mouth nightly as needed for sleep (take right at bedtime)       There is no refill protocol information for this order        Routing refill request to provider for review/approval because:  Drug not on the [facility] refill protocol     LASHELL Joseph, RN

## 2019-07-02 RX ORDER — ZOLPIDEM TARTRATE 5 MG/1
TABLET ORAL
Qty: 30 TABLET | Refills: 0 | OUTPATIENT
Start: 2019-07-02

## 2019-07-02 RX ORDER — ZOLPIDEM TARTRATE 5 MG/1
2.5-5 TABLET ORAL
Qty: 30 TABLET | Refills: 1 | Status: SHIPPED | OUTPATIENT
Start: 2019-07-02 | End: 2019-08-26

## 2019-07-24 ENCOUNTER — NURSE TRIAGE (OUTPATIENT)
Dept: NURSING | Facility: CLINIC | Age: 59
End: 2019-07-24

## 2019-07-25 NOTE — TELEPHONE ENCOUNTER
Started running recently and now has knee pain 12/10.  Should see provider with four hours.  Will wait until tomorrow.    Makayla Flynn RN  [facility]      Reason for Disposition    [1] SEVERE pain (e.g., excruciating, unable to walk) AND [2] not improved after 2 hours of pain medicine    Additional Information    Negative: Sounds like a life-threatening emergency to the triager    Negative: [1] Swollen joint AND [2] fever    Negative: [1] Red area or streak AND [2] fever    Negative: Patient sounds very sick or weak to the triager    Protocols used: KNEE PAIN-A-AH

## 2019-08-12 DIAGNOSIS — J01.90 ACUTE SINUSITIS WITH SYMPTOMS > 10 DAYS: ICD-10-CM

## 2019-08-12 NOTE — TELEPHONE ENCOUNTER
Requested Prescriptions   Pending Prescriptions Disp Refills     doxycycline hyclate (VIBRAMYCIN) 100 MG capsule [Pharmacy Med Name: DOXYCYCLINE HYCLATE 100MG CAPSULES] 20 capsule 0     Sig: TAKE 1 CAPSULE BY MOUTH TWICE DAILY X 10 DAYS       There is no refill protocol information for this order        Last Written Prescription Date:  9/28/18  Last Fill Quantity: 20,  # refills: 0   Last Office Visit with [facility] or [facility] prescribing provider:  3/8/19   Future Office Visit:

## 2019-08-13 NOTE — TELEPHONE ENCOUNTER
Please call and inform patient that she will need to be seen. Give options available. Send to provider to refuse medication as RN can not.    Dede Alexander RN, [facility]

## 2019-08-13 NOTE — TELEPHONE ENCOUNTER
Reason for Call:  Other prescription    Detailed comments: Bad sinus infection and cant get in too see provider. Needs this asap . Please call when approved. Sending High Priority Message    Phone Number Patient can be reached at: Home number on file [phone] (home)    Best Time: any    Can we leave a detailed message on this number? YES    Call taken on 8/13/2019 at 12:20 PM by Blaire Min

## 2019-08-14 RX ORDER — DOXYCYCLINE 100 MG/1
100 CAPSULE ORAL 2 TIMES DAILY
Qty: 20 CAPSULE | Refills: 0 | Status: SHIPPED | OUTPATIENT
Start: 2019-08-14 | End: 2019-08-24

## 2019-08-14 NOTE — TELEPHONE ENCOUNTER
Reason for Call:  Other prescription    Detailed comments: Pt calling for he attempted to schedule Dr. Desai but Provider is booked for next two weeks and Pt will not see any other provider. She would like to see if Dr. Desai can fit Pt in sooner tomorrow or Friday for she would like to have medication refilled as soon as possible.    Phone Number Patient can be reached at: Home number on file [phone] (home)    Best Time: anytime    Can we leave a detailed message on this number? YES    Call taken on 8/14/2019 at 3:37 PM by Charanjit Rothman

## 2019-08-26 DIAGNOSIS — F51.04 PSYCHOPHYSIOLOGICAL INSOMNIA: ICD-10-CM

## 2019-08-27 RX ORDER — ZOLPIDEM TARTRATE 5 MG/1
TABLET ORAL
Qty: 30 TABLET | Refills: 0 | Status: SHIPPED | OUTPATIENT
Start: 2019-08-27 | End: 2019-09-25

## 2019-08-27 NOTE — TELEPHONE ENCOUNTER
Routing refill request to provider for review/approval because:  Drug not on the [facility] refill protocol         Macy Crabtree RN, BSN, PHN

## 2019-08-27 NOTE — TELEPHONE ENCOUNTER
Requested Prescriptions   Pending Prescriptions Disp Refills     zolpidem (AMBIEN) 5 MG tablet [Pharmacy Med Name: ZOLPIDEM 5MG TABLETS] 30 tablet 0     Sig: TAKE 1/2 TO 1 TABLET BY MOUTH NIGHTLY AS NEEDED FOR SLEEP( TAKE RIGHT AT BEDTIME)       There is no refill protocol information for this order        Last Written Prescription Date:  7/2/19  Last Fill Quantity: 30,  # refills: 1   Last Office Visit with [facility], [facility] or [facility] prescribing provider:  3/8/19   Future Office Visit:

## 2019-08-28 NOTE — TELEPHONE ENCOUNTER
Written rx faxed to the pharmacy, Pharmacy will contact pt when medication is ready for pickup.  Noe Leonard,  [facility]

## 2019-09-20 ENCOUNTER — TELEPHONE (OUTPATIENT)
Dept: FAMILY MEDICINE | Facility: CLINIC | Age: 59
End: 2019-09-20

## 2019-09-20 NOTE — TELEPHONE ENCOUNTER
Panel Management Review   One phone call and send letter if unable to reach them or [employer] message and send letter if not read after 2 weeks (You will get a message to your inbasket)      BP Readings from Last 1 Encounters:   06/08/19 125/82        Health Maintenance Due   Topic Date Due     PREVENTIVE CARE VISIT  1960     HIV SCREENING  01/16/1975     ZOSTER IMMUNIZATION (1 of 2) 01/16/2010     PAP  12/10/2018     INFLUENZA VACCINE (1) 09/01/2019     PHQ-9  09/08/2019        Fail List measure:     Depression / Dysthymia review    Measure:  Needs PHQ-9 score of 4 or less during index window.  Administer PHQ-9 and if score is 5 or more, send encounter to provider for next steps.      PHQ-9 SCORE 7/20/2016 7/17/2018 3/8/2019   PHQ-9 Total Score - - -   PHQ-9 Total Score 4 11 5       If PHQ-9 recheck is 5 or more, route to provider for next steps.    Patient is due for:  PHQ9        Patient is due/failing the following:   PHQ9    Action needed:   Patient needs to do PHQ9.    Type of outreach:    Phone, left message for patient to call back.     Questions for provider review:    None                                                                                       Chart routed to Care Team .                                            Alyson Magana MA

## 2019-09-25 DIAGNOSIS — F51.04 PSYCHOPHYSIOLOGICAL INSOMNIA: ICD-10-CM

## 2019-09-25 NOTE — TELEPHONE ENCOUNTER
Requested Prescriptions   Pending Prescriptions Disp Refills     zolpidem (AMBIEN) 5 MG tablet [Pharmacy Med Name: ZOLPIDEM 5MG TABLETS] 30 tablet 0     Sig: TAKE 1/2 TO 1 TABLET BY MOUTH NIGHTLY AS NEEDED FOR SLEEP( TAKE RIGHT AT BEDTIME)       There is no refill protocol information for this order              Last Written Prescription Date:  8/27/19  Last Fill Quantity: 30,   # refills: 0  Last Office Visit: 3/8/19  Future Office visit:       Routing refill request to provider for review/approval because:  Drug not on the [facility] or [facility] refill protocol or controlled substance          Irwin Faarax  [facility]

## 2019-09-27 DIAGNOSIS — F51.04 PSYCHOPHYSIOLOGICAL INSOMNIA: ICD-10-CM

## 2019-09-27 NOTE — TELEPHONE ENCOUNTER
Routing refill request to provider for review/approval because:  Drug not on the [facility] refill protocol   Celsa Coffey RN

## 2019-09-28 RX ORDER — ZOLPIDEM TARTRATE 5 MG/1
2.5-5 TABLET ORAL
Qty: 30 TABLET | Refills: 5 | Status: SHIPPED | OUTPATIENT
Start: 2019-09-28 | End: 2020-03-16

## 2019-09-30 RX ORDER — ZOLPIDEM TARTRATE 5 MG/1
TABLET ORAL
Qty: 30 TABLET | Refills: 0 | OUTPATIENT
Start: 2019-09-30

## 2020-01-28 ENCOUNTER — OFFICE VISIT (OUTPATIENT)
Dept: URGENT CARE | Facility: URGENT CARE | Age: 60
End: 2020-01-28
Payer: COMMERCIAL

## 2020-01-28 VITALS
WEIGHT: 120.4 LBS | RESPIRATION RATE: 12 BRPM | DIASTOLIC BLOOD PRESSURE: 65 MMHG | OXYGEN SATURATION: 100 % | HEART RATE: 80 BPM | SYSTOLIC BLOOD PRESSURE: 122 MMHG | BODY MASS INDEX: 22.2 KG/M2 | TEMPERATURE: 97.4 F

## 2020-01-28 DIAGNOSIS — J01.10 ACUTE NON-RECURRENT FRONTAL SINUSITIS: Primary | ICD-10-CM

## 2020-01-28 PROCEDURE — 99213 OFFICE O/P EST LOW 20 MIN: CPT | Performed by: PHYSICIAN ASSISTANT

## 2020-01-28 RX ORDER — AMOXICILLIN 875 MG
875 TABLET ORAL 2 TIMES DAILY
Qty: 20 TABLET | Refills: 0 | Status: SHIPPED | OUTPATIENT
Start: 2020-01-28 | End: 2020-04-16

## 2020-01-28 ASSESSMENT — ENCOUNTER SYMPTOMS
CONSTITUTIONAL NEGATIVE: 1
EYES NEGATIVE: 1
MUSCULOSKELETAL NEGATIVE: 1
SINUS PAIN: 1
CARDIOVASCULAR NEGATIVE: 1
RESPIRATORY NEGATIVE: 1
SINUS PRESSURE: 1

## 2020-01-29 NOTE — PATIENT INSTRUCTIONS
Patient Education     Sinusitis (Antibiotic Treatment)    The sinuses are air-filled spaces within the bones of the face. They connect to the inside of the nose. Sinusitis is an inflammation of the tissue that lines the sinuses. Sinusitis can occur during a cold. It can also happen due to allergies to pollens and other particles in the air. Sinusitis can cause symptoms of sinus congestion and a feeling of fullness. A sinus infection causes fever, headache, and facial pain. There is often green or yellow fluid draining from the nose or into the back of the throat (post-nasal drip). You have been given antibiotics to treat this condition.  Home care    Take the full course of antibiotics as instructed. Do not stop taking them, even when you feel better.    Drink plenty of water, hot tea, and other liquids. This may help thin nasal mucus. It also may help your sinuses drain fluids.    Heat may help soothe painful areas of your face. Use a towel soaked in hot water. Or,  the shower and direct the warm spray onto your face. Using a vaporizer along with a menthol rub at night may also help soothe symptoms.     An expectorant with guaifenesin may help thin nasal mucus and help your sinuses drain fluids.    You can use an over-the-counter decongestant, unless a similar medicine was prescribed to you. Nasal sprays work the fastest. Use one that contains phenylephrine or oxymetazoline. First blow your nose gently. Then use the spray. Do not use these medicines more often than directed on the label. If you do, your symptoms may get worse. You may also take pills that contain pseudoephedrine. Don t use products that combine multiple medicines. This is because side effects may be increased. Read labels. You can also ask the pharmacist for help. (People with high blood pressure should not use decongestants. They can raise blood pressure.)    Over-the-counter antihistamines may help if allergies contributed to your  sinusitis.      Do not use nasal rinses or irrigation during an acute sinus infection, unless your healthcare provider tells you to. Rinsing may spread the infection to other areas in your sinuses.    Use acetaminophen or ibuprofen to control pain, unless another pain medicine was prescribed to you. If you have chronic liver or kidney disease or ever had a stomach ulcer, talk with your healthcare provider before using these medicines. (Aspirin should never be taken by anyone under age 18 who is ill with a fever. It may cause severe liver damage.)    Don't smoke. This can make symptoms worse.  Follow-up care  Follow up with your healthcare provider or our staff if you are not better in 1 week.  When to seek medical advice  Call your healthcare provider if any of these occur:    Facial pain or headache that gets worse    Stiff neck    Unusual drowsiness or confusion    Swelling of your forehead or eyelids    Vision problems, such as blurred or double vision    Fever of 100.4 F (38 C) or higher, or as directed by your healthcare provider    Seizure    Breathing problems    Symptoms don't go away in 10 days  Prevention  Here are steps you can take to help prevent an infection:    Keep good hand washing habits.    Don t have close contact with people who have sore throats, colds, or other upper respiratory infections.    Don t smoke, and stay away from secondhand smoke.    Stay up to date with of your vaccines.  Date Last Reviewed: 11/1/2017 2000-2019 [employer]. [address] [place] [address], [place] [address]. All rights reserved. This information is not intended as a substitute for professional medical care. Always follow your healthcare professional's instructions.

## 2020-01-29 NOTE — PROGRESS NOTES
SUBJECTIVE:   Paco Medrano is a 60 year old female presenting with a chief complaint of   Chief Complaint   Patient presents with     Sinus Problem     Fatigue, pressure/pain in face, stiff neck for a couple weeks        She is an established patient of [facility].  Patient presents with weeks of left side face pressure.  No fevers.      URI Adult    Onset of symptoms was 2 week(s) ago.  Course of illness is same.    Severity moderate  Current and Associated symptoms: facial pain/pressure  Treatment measures tried include None tried.  Predisposing factors include None.        Review of Systems   Constitutional: Negative.    HENT: Positive for sinus pressure and sinus pain.    Eyes: Negative.    Respiratory: Negative.    Cardiovascular: Negative.    Genitourinary: Negative.    Musculoskeletal: Negative.    Skin: Negative.    All other systems reviewed and are negative.      History reviewed. No pertinent past medical history.  Family History   Problem Relation Age of Onset     Glaucoma Mother      Hypertension Mother      Hypertension Father      Cancer Sister      Diabetes Daughter      Current Outpatient Medications   Medication Sig Dispense Refill     amoxicillin (AMOXIL) 875 MG tablet Take 1 tablet (875 mg) by mouth 2 times daily for 10 days 20 tablet 0     ibuprofen (ADVIL/MOTRIN) 200 MG tablet Take 200 mg by mouth every 4 hours as needed for mild pain       zolpidem (AMBIEN) 5 MG tablet Take 0.5-1 tablets (2.5-5 mg) by mouth nightly as needed for sleep (take right at bedtime) 30 tablet 5     loratadine (CLARITIN) 10 MG tablet Take 10 mg by mouth daily       sertraline (ZOLOFT) 50 MG tablet Take 1 tablet (50 mg) by mouth At Bedtime for depression and anxiety prevention. (Patient not taking: Reported on 6/8/2019) 30 tablet 1     Social History     Tobacco Use     Smoking status: Current Every Day Smoker     Start date: 1/9/2012     Smokeless tobacco: Never Used   Substance Use Topics     Alcohol use: Yes      Alcohol/week: 0.0 standard drinks       OBJECTIVE  /65   Pulse 80   Temp 97.4  F (36.3  C) (Oral)   Resp 12   Wt 54.6 kg (120 lb 6.4 oz)   SpO2 100%   BMI 22.20 kg/m      Physical Exam  Vitals signs and nursing note reviewed.   Constitutional:       Appearance: Normal appearance. She is normal weight.   HENT:      Head: Normocephalic and atraumatic.      Right Ear: Tympanic membrane, ear canal and external ear normal.      Left Ear: Tympanic membrane, ear canal and external ear normal.      Mouth/Throat:      Mouth: Mucous membranes are moist.      Pharynx: Oropharynx is clear.   Eyes:      Extraocular Movements: Extraocular movements intact.      Conjunctiva/sclera: Conjunctivae normal.   Neck:      Musculoskeletal: Normal range of motion and neck supple.   Cardiovascular:      Rate and Rhythm: Normal rate and regular rhythm.      Pulses: Normal pulses.      Heart sounds: Normal heart sounds.   Pulmonary:      Effort: Pulmonary effort is normal.      Breath sounds: Normal breath sounds.   Musculoskeletal: Normal range of motion.   Skin:     General: Skin is warm and dry.      Capillary Refill: Capillary refill takes less than 2 seconds.   Neurological:      General: No focal deficit present.      Mental Status: She is alert and oriented to person, place, and time.   Psychiatric:         Mood and Affect: Mood normal.         Behavior: Behavior normal.         Labs:  No results found for this or any previous visit (from the past 24 hour(s)).    X-Ray was not done.    ASSESSMENT:      ICD-10-CM    1. Acute non-recurrent frontal sinusitis J01.10 amoxicillin (AMOXIL) 875 MG tablet        Medical Decision Making:    Differential Diagnosis:  URI Adult/Peds:  Sinusitis    Serious Comorbid Conditions:  Adult:  None    PLAN:    URI Adult:  RX sinusitis  Augmentin 875 bid x 10 days.  Patient refused medrol dose renato    Followup:    If not improving or if condition worsens, follow up with your Primary Care Provider,  If not improving or if conditions worsens over the next 12-24 hours, go to the Emergency Department    Patient Instructions     Patient Education     Sinusitis (Antibiotic Treatment)    The sinuses are air-filled spaces within the bones of the face. They connect to the inside of the nose. Sinusitis is an inflammation of the tissue that lines the sinuses. Sinusitis can occur during a cold. It can also happen due to allergies to pollens and other particles in the air. Sinusitis can cause symptoms of sinus congestion and a feeling of fullness. A sinus infection causes fever, headache, and facial pain. There is often green or yellow fluid draining from the nose or into the back of the throat (post-nasal drip). You have been given antibiotics to treat this condition.  Home care    Take the full course of antibiotics as instructed. Do not stop taking them, even when you feel better.    Drink plenty of water, hot tea, and other liquids. This may help thin nasal mucus. It also may help your sinuses drain fluids.    Heat may help soothe painful areas of your face. Use a towel soaked in hot water. Or,  the shower and direct the warm spray onto your face. Using a vaporizer along with a menthol rub at night may also help soothe symptoms.     An expectorant with guaifenesin may help thin nasal mucus and help your sinuses drain fluids.    You can use an over-the-counter decongestant, unless a similar medicine was prescribed to you. Nasal sprays work the fastest. Use one that contains phenylephrine or oxymetazoline. First blow your nose gently. Then use the spray. Do not use these medicines more often than directed on the label. If you do, your symptoms may get worse. You may also take pills that contain pseudoephedrine. Don t use products that combine multiple medicines. This is because side effects may be increased. Read labels. You can also ask the pharmacist for help. (People with high blood pressure should not use  decongestants. They can raise blood pressure.)    Over-the-counter antihistamines may help if allergies contributed to your sinusitis.      Do not use nasal rinses or irrigation during an acute sinus infection, unless your healthcare provider tells you to. Rinsing may spread the infection to other areas in your sinuses.    Use acetaminophen or ibuprofen to control pain, unless another pain medicine was prescribed to you. If you have chronic liver or kidney disease or ever had a stomach ulcer, talk with your healthcare provider before using these medicines. (Aspirin should never be taken by anyone under age 18 who is ill with a fever. It may cause severe liver damage.)    Don't smoke. This can make symptoms worse.  Follow-up care  Follow up with your healthcare provider or our staff if you are not better in 1 week.  When to seek medical advice  Call your healthcare provider if any of these occur:    Facial pain or headache that gets worse    Stiff neck    Unusual drowsiness or confusion    Swelling of your forehead or eyelids    Vision problems, such as blurred or double vision    Fever of 100.4 F (38 C) or higher, or as directed by your healthcare provider    Seizure    Breathing problems    Symptoms don't go away in 10 days  Prevention  Here are steps you can take to help prevent an infection:    Keep good hand washing habits.    Don t have close contact with people who have sore throats, colds, or other upper respiratory infections.    Don t smoke, and stay away from secondhand smoke.    Stay up to date with of your vaccines.  Date Last Reviewed: 11/1/2017 2000-2019 [employer]. [address] [place] [address], [place] [address]. All rights reserved. This information is not intended as a substitute for professional medical care. Always follow your healthcare professional's instructions.

## 2020-01-29 NOTE — PROGRESS NOTES
"SUBJECTIVE:   Paco Medrano is a 60 year old female presenting with a chief complaint of   Chief Complaint   Patient presents with     Sinus Problem     Fatigue, pressure/pain in face, stiff neck for a couple weeks        She is an established patient of [facility].    { Conditions:765595}    Review of Systems    History reviewed. No pertinent past medical history.  Family History   Problem Relation Age of Onset     Glaucoma Mother      Hypertension Mother      Hypertension Father      Cancer Sister      Diabetes Daughter      Current Outpatient Medications   Medication Sig Dispense Refill     ibuprofen (ADVIL/MOTRIN) 200 MG tablet Take 200 mg by mouth every 4 hours as needed for mild pain       zolpidem (AMBIEN) 5 MG tablet Take 0.5-1 tablets (2.5-5 mg) by mouth nightly as needed for sleep (take right at bedtime) 30 tablet 5     loratadine (CLARITIN) 10 MG tablet Take 10 mg by mouth daily       sertraline (ZOLOFT) 50 MG tablet Take 1 tablet (50 mg) by mouth At Bedtime for depression and anxiety prevention. (Patient not taking: Reported on 6/8/2019) 30 tablet 1     Social History     Tobacco Use     Smoking status: Current Every Day Smoker     Start date: 1/9/2012     Smokeless tobacco: Never Used   Substance Use Topics     Alcohol use: Yes     Alcohol/week: 0.0 standard drinks       OBJECTIVE  /65   Pulse 80   Temp 97.4  F (36.3  C) (Oral)   Resp 12   Wt 54.6 kg (120 lb 6.4 oz)   SpO2 100%   BMI 22.20 kg/m      Physical Exam    Labs:  No results found for this or any previous visit (from the past 24 hour(s)).    {XRay was/was not done (Optional):512535}    ASSESSMENT:    No diagnosis found.     Medical Decision Making:    Differential Diagnosis:  { Differential Choices:445837}    Serious Comorbid Conditions:  { Serious Comorbid Conditions:260654}    PLAN:    { Plan Choices:414747}    Followup:    { Followup:677839::\"If not improving or if condition worsens, follow up with your Primary Care " "Provider\"}    There are no Patient Instructions on file for this visit.      "

## 2020-02-12 NOTE — PROGRESS NOTES
SUBJECTIVE:   Paco Medrano is a 58 year old female who presents to clinic today for the following health issues:      Chief Complaint   Patient presents with     Sinus Problem     Patient complains of sinus problems        Duration: 2 weeks     Description (location/character/radiation): facial pain, nasal congestion, headache, cough, purulent drainage     Intensity:  moderate    Accompanying signs and symptoms: fever, chills     History (similar episodes/previous evaluation): h/o sinusitis     Precipitating or alleviating factors: None    Therapies tried and outcome: OTC analgesia,        Problem list and histories reviewed & adjusted, as indicated.  Additional history: as documented    Patient Active Problem List   Diagnosis     CARDIOVASCULAR SCREENING; LDL GOAL LESS THAN 160     Advance care planning     Psychophysiological insomnia     Tobacco use disorder     No past surgical history on file.    Social History   Substance Use Topics     Smoking status: Current Every Day Smoker     Start date: 1/9/2012     Smokeless tobacco: Never Used     Alcohol use 0.0 oz/week     0 Standard drinks or equivalent per week     Family History   Problem Relation Age of Onset     Glaucoma Mother      Hypertension Mother      Hypertension Father      Cancer Sister      Diabetes Daughter          Current Outpatient Prescriptions   Medication Sig Dispense Refill     zolpidem (AMBIEN) 5 MG tablet Take 0.5-1 tablets (2.5-5 mg) by mouth nightly as needed for sleep (take right at bedtime) 30 tablet 5     sertraline (ZOLOFT) 50 MG tablet Take 1 tablet (50 mg) by mouth At Bedtime for depression and anxiety prevention. (Patient not taking: Reported on 8/31/2018) 30 tablet 1     Allergies   Allergen Reactions     Azithromycin Nausea     Erythromycin      gi upset     Septra [Sulfamethoxazole W-Trimethoprim] Rash     Recent Labs   Lab Test 06/16/15   A1C  5.3   LDL  121   HDL  66   TRIG  99      BP Readings from Last 3 Encounters:  [FreeTextEntry1] : Plan: Aditya is a 14-year-old boy who is 6 1/2  weeks from sustaining his injury. His fracture is currently healing well in acceptable alignment. The recommendation at this time would be to gradually wean out of the Cam Walker over the next 3 weeks, and continue physical therapy with no activities. He may discontinue the Cam Walker completely at home in a controlled setting. He will followup in 4 weeks for repeat examination and x-rays of the left ankle at that time.\par \par At followup appointment obtain xrays AP/LAT/OBL of the left ankle\par \par We had a thorough talk in regards to the diagnosis, prognosis and treatment modalities.  All questions and concerns were addressed today. There was a verbal understanding from the parents and patient.\par \par KAZ Winter have acted as a scribe and documented the above information for Dr. Durbin.\par \par The above documentation  completed by the scribe is an accurate record of both my words and actions.\par \par Dr. Durbin   08/31/18 130/84   07/17/18 131/84   03/27/18 123/62    Wt Readings from Last 3 Encounters:   08/31/18 122 lb 12.8 oz (55.7 kg)   07/17/18 125 lb (56.7 kg)   03/27/18 125 lb (56.7 kg)                  Labs reviewed in EPIC    Reviewed and updated as needed this visit by clinical staff  Tobacco  Allergies  Meds       Reviewed and updated as needed this visit by Provider         ROS:  Constitutional, HEENT, cardiovascular, pulmonary, gi and gu systems are negative, except as otherwise noted.    OBJECTIVE:     /84 (BP Location: Left arm, Patient Position: Chair, Cuff Size: Adult Regular)  Pulse 89  Temp 98.1  F (36.7  C) (Oral)  Wt 122 lb 12.8 oz (55.7 kg)  SpO2 100%  BMI 22.64 kg/m2  Body mass index is 22.64 kg/(m^2).  GENERAL: healthy, alert and no distress  EYES: Eyes grossly normal to inspection, PERRL and conjunctivae and sclerae normal  HENT: normal cephalic/atraumatic, ear canals and TM's normal, nasal mucosa edematous , oral mucous membranes moist and sinuses: maxillary, frontal tenderness on bilateral  NECK: no adenopathy, no asymmetry, masses, or scars and thyroid normal to palpation  RESP: lungs clear to auscultation - no rales, rhonchi or wheezes  CV: regular rates and rhythm, normal S1 S2, no S3 or S4 and no murmur, click or rub  MS: no gross musculoskeletal defects noted, no edema    ASSESSMENT/PLAN:         ICD-10-CM    1. Acute sinusitis with symptoms > 10 days J01.90 doxycycline (VIBRAMYCIN) 100 MG capsule       Discussed in detail differentials and further management. Symptoms are likely secondary to acute sinusitis with symptoms >10 days.  Doxycycline prescribed, common side effects discussed.  Recommended well hydration, over-the-counter analgesia, warm fluids and steam inhalation. Follow-up if symptoms persist or worsen. Written instructions/information provided. Patient understood and in agreement with the above plan. All questions are answered.           Patient Instructions      Sinusitis (Antibiotic Treatment)    The sinuses are air-filled spaces within the bones of the face. They connect to the inside of the nose. Sinusitis is an inflammation of the tissue that lines the sinuses. Sinusitis can occur during a cold. It can also happen due to allergies to pollens and other particles in the air. Sinusitis can cause symptoms of sinus congestion and a feeling of fullness. A sinus infection causes fever, headache, and facial pain. There is often green or yellow fluid draining from the nose or into the back of the throat (post-nasal drip). You have been given antibiotics to treat this condition.  Home care    Take the full course of antibiotics as instructed. Do not stop taking them, even when you feel better.    Drink plenty of water, hot tea, and other liquids. This may help thin nasal mucus. It also may help your sinuses drain fluids.    Heat may help soothe painful areas of your face. Use a towel soaked in hot water. Or,  the shower and direct the warm spray onto your face. Using a vaporizer along with a menthol rub at night may also help soothe symptoms.     An expectorant with guaifenesin may help thin nasal mucus and help your sinuses drain fluids.    You can use an over-the-counter decongestant, unless a similar medicine was prescribed to you. Nasal sprays work the fastest. Use one that contains phenylephrine or oxymetazoline. First blow your nose gently. Then use the spray. Do not use these medicines more often than directed on the label. If you do, your symptoms may get worse. You may also take pills that contain pseudoephedrine. Don t use products that combine multiple medicines. This is because side effects may be increased. Read labels. You can also ask the pharmacist for help. (People with high blood pressure should not use decongestants. They can raise blood pressure.)    Over-the-counter antihistamines may help if allergies contributed to your sinusitis.      Do not  use nasal rinses or irrigation during an acute sinus infection, unless your healthcare provider tells you to. Rinsing may spread the infection to other areas in your sinuses.    Use acetaminophen or ibuprofen to control pain, unless another pain medicine was prescribed to you. If you have chronic liver or kidney disease or ever had a stomach ulcer, talk with your healthcare provider before using these medicines. (Aspirin should never be taken by anyone under age 18 who is ill with a fever. It may cause severe liver damage.)    Don't smoke. This can make symptoms worse.  Follow-up care  Follow up with your healthcare provider or our staff if you are better in 1 week.  When to seek medical advice  Call your healthcare provider if any of these occur:    Facial pain or headache that gets worse    Stiff neck    Unusual drowsiness or confusion    Swelling of your forehead or eyelids    Vision problems, such as blurred or double vision    Fever of 100.4 F (38 C) or higher, or as directed by your healthcare provider    Seizure    Breathing problems    Symptoms don't go away in 10 days  Prevention  Here are steps you can take to help prevent an infection:    Keep good hand washing habits.    Don t have close contact with people who have sore throats, colds, or other upper respiratory infections.    Don t smoke, and stay away from secondhand smoke.    Stay up to date with of your vaccines.  Date Last Reviewed: 11/1/2017 2000-2017 [employer]. [address] [place] [address], [place] [address]. All rights reserved. This information is not intended as a substitute for professional medical care. Always follow your healthcare professional's instructions.            Darrick Hatfield MD  [facility]

## 2020-03-04 NOTE — LETTER
October 17, 2019      Paco Medrano  [address] [place] [address]        Dear ,    Please complete PHQ-9 and return.    If you have any questions or concerns, please call the clinic at the number listed above.       Sincerely,        Rodney Desai MD/sage                   No abnormal movements

## 2020-03-09 DIAGNOSIS — F51.04 PSYCHOPHYSIOLOGICAL INSOMNIA: ICD-10-CM

## 2020-03-09 NOTE — TELEPHONE ENCOUNTER
Requested Prescriptions   Pending Prescriptions Disp Refills     zolpidem (AMBIEN) 5 MG tablet [Pharmacy Med Name: ZOLPIDEM 5MG TABLETS]        Last Written Prescription Date:  09/28/19  Last Fill Quantity: 30,   # refills: 5  Last Office Visit: 03/08/19Sara  Future Office visit:       Routing refill request to provider for review/approval because:  Drug not on the G, P or [facility] refill protocol or controlled substance 30 tablet      Sig: TAKE 1/2 TO 1 TABLET(2.5 TO 5 MG) BY MOUTH EVERY NIGHT AT BEDTIME AS NEEDED FOR SLEEP( TAKE RIGHT AT BEDTIME)       There is no refill protocol information for this order

## 2020-03-11 DIAGNOSIS — F51.04 PSYCHOPHYSIOLOGICAL INSOMNIA: ICD-10-CM

## 2020-03-11 NOTE — TELEPHONE ENCOUNTER
Requested Prescriptions   Pending Prescriptions Disp Refills     zolpidem (AMBIEN) 5 MG tablet [Pharmacy Med Name: ZOLPIDEM 5MG TABLETS] 30 tablet      Sig: TAKE 1/2 TO 1 TABLET(2.5 TO 5 MG) BY MOUTH EVERY NIGHT AT BEDTIME AS NEEDED FOR SLEEP( TAKE RIGHT AT BEDTIME)       There is no refill protocol information for this order        Last Written Prescription Date:  9/28/19  Last Fill Quantity: 30,  # refills: 5   Last office visit: 3/8/2019 with prescribing provider:  Rodney Desai     Future Office Visit:

## 2020-03-12 DIAGNOSIS — F51.04 PSYCHOPHYSIOLOGICAL INSOMNIA: ICD-10-CM

## 2020-03-13 DIAGNOSIS — F51.04 PSYCHOPHYSIOLOGICAL INSOMNIA: ICD-10-CM

## 2020-03-13 NOTE — TELEPHONE ENCOUNTER
Routing refill request to provider for review/approval because:  Drug not on the [facility] refill protocol.    Dede Alexander RN, [facility] Triage

## 2020-03-14 NOTE — TELEPHONE ENCOUNTER
Requested Prescriptions   Pending Prescriptions Disp Refills     zolpidem (AMBIEN) 5 MG tablet [Pharmacy Med Name: ZOLPIDEM 5MG TABLETS] 30 tablet      Sig: TAKE 1/2 TO 1 TABLET(2.5 TO 5 MG) BY MOUTH EVERY NIGHT AT BEDTIME AS NEEDED FOR SLEEP( TAKE RIGHT AT BEDTIME)       There is no refill protocol information for this order        Last Written Prescription Date:  9/28/19  Last Fill Quantity: 30,  # refills: 5   Last office visit: 3/8/2019 with prescribing provider:     Future Office Visit:      Routing refill request to provider for review/approval because:  Drug not on the [facility], P or [facility] refill protocol or controlled substance

## 2020-03-16 ENCOUNTER — TELEPHONE (OUTPATIENT)
Dept: FAMILY MEDICINE | Facility: CLINIC | Age: 60
End: 2020-03-16

## 2020-03-16 RX ORDER — ZOLPIDEM TARTRATE 5 MG/1
TABLET ORAL
Qty: 30 TABLET | OUTPATIENT
Start: 2020-03-16

## 2020-03-16 RX ORDER — ZOLPIDEM TARTRATE 5 MG/1
2.5-5 TABLET ORAL
Qty: 30 TABLET | Refills: 0 | Status: SHIPPED | OUTPATIENT
Start: 2020-03-16 | End: 2020-04-10

## 2020-03-16 NOTE — TELEPHONE ENCOUNTER
Plan does not cover this medication for more than 60 days per year.    Plan does not cover  zolpidem (AMBIEN) 5 MG tablet  30 tablet  0  3/16/2020      .  Please call [phone] to initiate Prior Auth or change med.    Insurance type and ID number: [id number]      Additional Information:     Isabel Higgins

## 2020-03-16 NOTE — TELEPHONE ENCOUNTER
Reason for Call:  Other prescription    Detailed comments: Completely out of medication and needs this refilled right away.     Phone Number Patient can be reached at: Home number on file [phone] (home)    Best Time: Any    Can we leave a detailed message on this number? YES    Call taken on 3/16/2020 at 10:03 AM by Terri Palencia

## 2020-03-17 NOTE — TELEPHONE ENCOUNTER
Central Prior Authorization Team   Phone: [phone]      PA Initiation    Medication: zolpidem-Initiated  Insurance Company: [email or web address] - Phone [phone] Fax [phone]  Pharmacy Filling the Rx: [id number] [place], [place] - [address] [place] [address] [facility]  [address]  Filling Pharmacy Phone: [phone]  Filling Pharmacy Fax:    Start Date: 3/17/2020

## 2020-03-17 NOTE — TELEPHONE ENCOUNTER
Prior Authorization Approval    Authorization Effective Date: 2/16/2020  Authorization Expiration Date: 3/17/2021  Medication: zolpidem-APPROVED  Approved Dose/Quantity:   Reference #:     Insurance Company: [employer] - Phone [phone] Fax [phone]  Expected CoPay:       CoPay Card Available:      Foundation Assistance Needed:    Which Pharmacy is filling the prescription (Not needed for infusion/clinic administered): [id number] [place] [address] [place] AT [facility] [address]  Pharmacy Notified: Yes  Patient Notified: No    Pharmacy will notify patient when medication is ready.

## 2020-04-09 ENCOUNTER — TELEPHONE (OUTPATIENT)
Dept: FAMILY MEDICINE | Facility: CLINIC | Age: 60
End: 2020-04-09

## 2020-04-09 DIAGNOSIS — F51.04 PSYCHOPHYSIOLOGICAL INSOMNIA: ICD-10-CM

## 2020-04-09 NOTE — TELEPHONE ENCOUNTER
Requested Prescriptions   Pending Prescriptions Disp Refills     zolpidem (AMBIEN) 5 MG tablet [Pharmacy Med Name: ZOLPIDEM 5MG TABLETS]        Last Written Prescription Date:  03/16/2020  Last Fill Quantity: 30,   # refills: 0  Last Office Visit: 03/08/19-Giselle  Future Office visit:       Routing refill request to provider for review/approval because:  Drug not on the G, P or [facility] refill protocol or controlled substance 30 tablet      Sig: TAKE 1/2 TO 1 TABLET(2.5 TO 5 MG) BY MOUTH EVERY NIGHT AT BEDTIME AS NEEDED FOR SLEEP       There is no refill protocol information for this order

## 2020-04-10 RX ORDER — ZOLPIDEM TARTRATE 5 MG/1
TABLET ORAL
Qty: 30 TABLET | Refills: 0 | Status: SHIPPED | OUTPATIENT
Start: 2020-04-10 | End: 2020-05-11

## 2020-04-13 NOTE — TELEPHONE ENCOUNTER
Look sl like prior auth was done in march and approved for a year    Can this be done today as patient is out now    Thank you

## 2020-04-16 ENCOUNTER — VIRTUAL VISIT (OUTPATIENT)
Dept: FAMILY MEDICINE | Facility: CLINIC | Age: 60
End: 2020-04-16
Payer: COMMERCIAL

## 2020-04-16 DIAGNOSIS — J01.01 ACUTE RECURRENT MAXILLARY SINUSITIS: ICD-10-CM

## 2020-04-16 PROCEDURE — 99213 OFFICE O/P EST LOW 20 MIN: CPT | Mod: 95 | Performed by: FAMILY MEDICINE

## 2020-04-16 RX ORDER — CEFUROXIME AXETIL 500 MG/1
500 TABLET ORAL 2 TIMES DAILY
Qty: 20 TABLET | Refills: 0 | Status: SHIPPED | OUTPATIENT
Start: 2020-04-16 | End: 2020-07-08

## 2020-04-16 ASSESSMENT — PAIN SCALES - GENERAL: PAINLEVEL: MODERATE PAIN (4)

## 2020-04-16 NOTE — PROGRESS NOTES
"Paco Medrano is a 60 year old female who is being evaluated via a billable telephone visit.      The patient has been notified of following:     \"This telephone visit will be conducted via a call between you and your physician/provider. We have found that certain health care needs can be provided without the need for a physical exam.  This service lets us provide the care you need with a short phone conversation.  If a prescription is necessary we can send it directly to your pharmacy.  If lab work is needed we can place an order for that and you can then stop by our lab to have the test done at a later time.    Telephone visits are billed at different rates depending on your insurance coverage. During this emergency period, for some insurers they may be billed the same as an in-person visit.  Please reach out to your insurance provider with any questions.    If during the course of the call the physician/provider feels a telephone visit is not appropriate, you will not be charged for this service.\"    Patient has given verbal consent for Telephone visit?  Yes    How would you like to obtain your AVS? Mail a copy    Subjective     Paco Medrano is a 60 year old female who presents to clinic today for the following health issues:    Patient c/o runny nose, stuffy nose and facial pain for about 1 week. Worsening. No fever. No cp/sob. History of recurrent infections in the past.      Patient Active Problem List   Diagnosis     CARDIOVASCULAR SCREENING; LDL GOAL LESS THAN 160     Advance care planning     Psychophysiological insomnia     Tobacco use disorder     Major depressive disorder, recurrent episode, mild (H)     Anxiety     History reviewed. No pertinent surgical history.    Social History     Tobacco Use     Smoking status: Current Every Day Smoker     Start date: 1/9/2012     Smokeless tobacco: Never Used   Substance Use Topics     Alcohol use: Yes     Alcohol/week: 0.0 standard drinks     Family " History   Problem Relation Age of Onset     Glaucoma Mother      Hypertension Mother      Hypertension Father      Cancer Sister      Diabetes Daughter          Current Outpatient Medications   Medication Sig Dispense Refill     cefuroxime (CEFTIN) 500 MG tablet Take 1 tablet (500 mg) by mouth 2 times daily for sinus infection. 20 tablet 0     ibuprofen (ADVIL/MOTRIN) 200 MG tablet Take 200 mg by mouth every 4 hours as needed for mild pain       loratadine (CLARITIN) 10 MG tablet Take 10 mg by mouth daily       zolpidem (AMBIEN) 5 MG tablet TAKE 1/2 TO 1 TABLET(2.5 TO 5 MG) BY MOUTH EVERY NIGHT AT BEDTIME AS NEEDED FOR SLEEP 30 tablet 0     sertraline (ZOLOFT) 50 MG tablet Take 1 tablet (50 mg) by mouth At Bedtime for depression and anxiety prevention. (Patient not taking: Reported on 6/8/2019) 30 tablet 1     Allergies   Allergen Reactions     Azithromycin Nausea     Erythromycin      gi upset     Septra [Sulfamethoxazole W-Trimethoprim] Rash     BP Readings from Last 3 Encounters:   01/28/20 122/65   06/08/19 125/82   03/08/19 133/78    Wt Readings from Last 3 Encounters:   01/28/20 54.6 kg (120 lb 6.4 oz)   06/08/19 52.2 kg (115 lb)   03/08/19 54 kg (119 lb)                    Reviewed and updated as needed this visit by Provider         Review of Systems   ROS COMP: Constitutional, HEENT, cardiovascular, pulmonary, GI, , musculoskeletal, neuro, skin, endocrine and psych systems are negative, except as otherwise noted.       Objective   Reported vitals:  Breastfeeding No    healthy, alert and no distress  PSYCH: Alert and oriented times 3; coherent speech, normal   rate and volume, able to articulate logical thoughts, able   to abstract reason, no tangential thoughts, no hallucinations   or delusions  Her affect is normal  RESP: No cough, no audible wheezing, able to talk in full sentences  Remainder of exam unable to be completed due to telephone visits    Diagnostic Test Results:  Labs reviewed in Epic         Assessment/Plan:  1. Acute recurrent maxillary sinusitis  Treat with cefuroxime which has worked well in the past. Discussed continued symptomatic cares. Patient will let us know if symptoms not improving.  - cefuroxime (CEFTIN) 500 MG tablet; Take 1 tablet (500 mg) by mouth 2 times daily for sinus infection.  Dispense: 20 tablet; Refill: 0    Return in about 6 months (around 10/16/2020) for Physical Exam.      Phone call duration:  15 minutes    Rome Leigh MD, MD

## 2020-05-07 DIAGNOSIS — F51.04 PSYCHOPHYSIOLOGICAL INSOMNIA: ICD-10-CM

## 2020-05-07 NOTE — TELEPHONE ENCOUNTER
Requested Prescriptions   Pending Prescriptions Disp Refills     zolpidem (AMBIEN) 5 MG tablet [Pharmacy Med Name: ZOLPIDEM 5MG TABLETS] 30 tablet      Sig: TAKE 1/2 TO 1 TABLET(2.5 TO 5 MG) BY MOUTH EVERY NIGHT AT BEDTIME AS NEEDED FOR SLEEP       There is no refill protocol information for this order        Routing refill request to provider for review/approval because:  Drug not on the [facility] refill protocol       Macy Crabtree RN, BSN, PHN

## 2020-05-11 RX ORDER — ZOLPIDEM TARTRATE 5 MG/1
TABLET ORAL
Qty: 30 TABLET | Refills: 4 | Status: SHIPPED | OUTPATIENT
Start: 2020-05-11 | End: 2020-10-07

## 2020-07-06 ENCOUNTER — TELEPHONE (OUTPATIENT)
Dept: FAMILY MEDICINE | Facility: CLINIC | Age: 60
End: 2020-07-06

## 2020-07-06 DIAGNOSIS — J01.01 ACUTE RECURRENT MAXILLARY SINUSITIS: ICD-10-CM

## 2020-07-06 NOTE — TELEPHONE ENCOUNTER
.Reason for call:  Medication   If this is a refill request, has the caller requested the refill from the pharmacy already? No  Will the patient be using a [facility]? No  Name of the pharmacy and phone number for the current request: [facility] in [place]    Name of the medication requested: sinus infection antibiotic    Other request: fill script. Pt has on-going sinus infection issues    Phone number to reach patient:  Home number on file [phone] (home)    Best Time:  anytime    Can we leave a detailed message on this number?  YES    Travel screening: Negative

## 2020-07-07 NOTE — TELEPHONE ENCOUNTER
Reason for call:  Medication   If this is a refill request, has the caller requested the refill from the pharmacy already? No  Will the patient be using a [facility]? No  Name of the pharmacy and phone number for the current request: [facility] in [place]     Name of the medication requested: sinus infection antibiotic     Other request: fill script. Pt has on-going sinus infection issues     Phone number to reach patient:  Home number on file [phone] (home)     Best Time:  anytime     Can we leave a detailed message on this number?  YES     Travel screening: Negative    Requested Prescriptions   Pending Prescriptions Disp Refills     cefuroxime (CEFTIN) 500 MG tablet [Pharmacy Med Name: CEFUROXIME 500MG TABLETS] 20 tablet 0     Sig: TAKE 1 TABLET BY MOUTH TWICE DAILY FOR SINUS INFECTION       There is no refill protocol information for this order        Routing refill request to provider for review/approval because:  Drug not on the [facility] refill protocol         Macy Crabtree RN, BSN, PHN

## 2020-07-08 RX ORDER — CEFUROXIME AXETIL 500 MG/1
TABLET ORAL
Qty: 20 TABLET | Refills: 0 | Status: SHIPPED | OUTPATIENT
Start: 2020-07-08 | End: 2020-09-28

## 2020-07-08 NOTE — TELEPHONE ENCOUNTER
Routing refill request to provider for review/approval because:  Drug not on the [facility] refill protocol     Dede Alexander RN, [facility] Triage

## 2020-08-22 ENCOUNTER — OFFICE VISIT (OUTPATIENT)
Dept: URGENT CARE | Facility: URGENT CARE | Age: 60
End: 2020-08-22
Payer: COMMERCIAL

## 2020-08-22 VITALS
SYSTOLIC BLOOD PRESSURE: 140 MMHG | RESPIRATION RATE: 16 BRPM | HEART RATE: 100 BPM | DIASTOLIC BLOOD PRESSURE: 71 MMHG | OXYGEN SATURATION: 99 % | TEMPERATURE: 98.7 F

## 2020-08-22 DIAGNOSIS — S91.311A FOOT LACERATION, RIGHT, INITIAL ENCOUNTER: Primary | ICD-10-CM

## 2020-08-22 DIAGNOSIS — S81.811A LACERATION OF LOWER LEG, RIGHT, INITIAL ENCOUNTER: ICD-10-CM

## 2020-08-22 PROCEDURE — 12001 RPR S/N/AX/GEN/TRNK 2.5CM/<: CPT | Performed by: PHYSICIAN ASSISTANT

## 2020-08-22 NOTE — LETTER
[address] [address]  Phone: [phone]    August 23, 2020        Paco Medrano  [address] [place]  [facility] [address]          To whom it may concern:    RE: Paco Medrano    Patient was seen and treated at our clinic on 8/22/2020. Please excuse from work 8/24/2020 due to her foot laceration.    Please contact me for questions or concerns.      Sincerely,        Jaylyn Amezquita PA-C

## 2020-08-22 NOTE — PROGRESS NOTES
S:  59 yo female is here for 2 lacerations.  One on her  right shin and one on the bottom of her right foot.  Slipped onto a floor vent that had the metal grid removed.  Cut it on the exposed flat edge of the metal box. No numbness/tingling. No fever    No bleeding disorder. No blood thinners. Not diabetic    Last tetanus booster within 5 years: yes       Allergies   Allergen Reactions     Azithromycin Nausea     Erythromycin      gi upset     Septra [Sulfamethoxazole W-Trimethoprim] Rash       No past medical history on file.    cefuroxime (CEFTIN) 500 MG tablet, TAKE 1 TABLET BY MOUTH TWICE DAILY FOR SINUS INFECTION  ibuprofen (ADVIL/MOTRIN) 200 MG tablet, Take 200 mg by mouth every 4 hours as needed for mild pain  loratadine (CLARITIN) 10 MG tablet, Take 10 mg by mouth daily  sertraline (ZOLOFT) 50 MG tablet, Take 1 tablet (50 mg) by mouth At Bedtime for depression and anxiety prevention. (Patient not taking: Reported on 6/8/2019)  zolpidem (AMBIEN) 5 MG tablet, TAKE 1/2 TO 1 TABLET(2.5 TO 5 MG) BY MOUTH EVERY NIGHT AT BEDTIME AS NEEDED FOR SLEEP    No current facility-administered medications on file prior to visit.         ROS:  SKIN: as above  Musculoskeletal: as above    OBJECTIVE:  Blood pressure (!) 140/71, pulse 100, temperature 98.7  F (37.1  C), temperature source Oral, resp. rate 16, SpO2 99 %, not currently breastfeeding.     The patient appears anxious.    Laceration/Size: #1Right anterior shin, avulsion type laceration, circular, skin missing, 0.3 by 0.3mm. #2- bottom right foot MTP area with circular flap laceration- 2 edges not attached are 5mm by 5 mm in length.  No obvious FOREIGN BODY with palpation and visualization of both lacerations.    Characteristics of the foot laceration: flap type  Tendon function intact: yes  Sensation to light touch intact: yes  Pulses intact: yes  Cap refill intact.  Wound clean. No FBs.      Assessment:    ICD-10-CM    1. Foot laceration, right, initial encounter   S91.311A REPAIR SUPERFICIAL, WOUND BODY < =2.5CM     amoxicillin-clavulanate (AUGMENTIN) 875-125 MG tablet     HYDROcodone-acetaminophen (NORCO) 5-325 MG tablet   2. Laceration of lower leg, right, initial encounter  S81.811A amoxicillin-clavulanate (AUGMENTIN) 875-125 MG tablet     HYDROcodone-acetaminophen (NORCO) 5-325 MG tablet         PLAN:  Oral consent received. Risks discussed. Soaked in Hibiclens and sterile water. Betadine x 3 placed.  Wound was locally injected with 3 cc's of Lidocaine 1% plain, good anesthesia achieved.    Wound cleaned with saline. Wound explored, no FBs.  Using serile fashion, the   laceration was closed using 6, 4-0 Ethilon interrupted sutures to tack it down.  Bacitracin, Telfa, gauze, gauze wrap, coban applied.  Patient tolerated procedure well.    Shin lac- no sutures required. Bacitracin and band aid applied.  Declines cast boot- has some at home.  Patient Instructions   Keep dry for 48 hours. Then change dressing daily, Keep it covered. Watch for evidence of infection- redness, drainage, fever, increased pain. Suture removal 12-14 days.  Patient Education     Extremity Laceration: Stitches, Staples, or Tape  A laceration is a cut through the skin. If it is deep, it may require stitches or staples to close so it can heal. Minor cuts may be treated with surgical tape closures, or skin glue.  X-rays may be done if something may have entered the skin through the cut. You may also need a tetanus shot if you are not up to date on this vaccine.  Home care    Follow the healthcare provider s instructions on how to care for the cut.    Wash your hands with soap and warm water before and after caring for your wound. This is to help prevent infection.    Keep the wound clean and dry. If a bandage was applied and it becomes wet or dirty, replace it. Otherwise, leave it in place for the first 24 hours, then change it once a day or as directed.    If stitches or staples were used, clean the  wound daily:  ? After removing the bandage, wash the area with soap and water. Use a wet cotton swab to loosen and remove any blood or crust that forms.  ? After cleaning, keep the wound clean and dry. Talk with your healthcare provider before putting any antibiotic ointment on the wound. Reapply the bandage.    You may remove the bandage to shower as usual after the first 24 hours, but don't soak the area in water (no swimming) until the stitches or staples are removed.    If surgical tape closures were used, keep the area clean and dry. If it becomes wet, blot it dry with a towel. Let the surgical tape fall off on its own.    The healthcare provider may prescribe an antibiotic cream or ointment to prevent infection. He or she may also prescribe an antibiotic pill. Don't stop taking this medicine until you have finished it all or the provider tells you to stop.    The provider may also prescribe medicine for pain. Follow the instructions for taking these medicines.    Don't do activities that may reopen your wound.  Follow-up care  Follow up with your healthcare provider, or as advised. Most skin wounds heal within 10 days. But an infection may sometimes occur even with proper treatment. Check the wound daily for the signs of infection listed below. Stitches and staples should be removed within 7 to14 days. If surgical tape closures were used, you may remove them after 10 days if they have not fallen off by then.   When to seek medical advice  Call your healthcare provider right away if any of these occur:    Wound bleeding not controlled by direct pressure    Signs of infection, including increasing pain in the wound, increasing wound redness or swelling, or pus or bad odor coming from the wound    Fever of 100.4 F (38 C) or higher, or as directed by your healthcare provider    Stitches or staples come apart or fall out or surgical tape falls off before 7 days    Wound edges reopen    Wound changes  colors    Numbness occurs around the wound     Decreased movement around the injured area  Date Last Reviewed: 7/1/2017 2000-2019 [employer]. [address] [facility], [place] [address]. All rights reserved. This information is not intended as a substitute for professional medical care. Always follow your healthcare professional's instructions.               Jaylyn Amezquita PA-C

## 2020-08-22 NOTE — PATIENT INSTRUCTIONS
Keep dry for 48 hours. Then change dressing daily, Keep it covered. Watch for evidence of infection- redness, drainage, fever, increased pain. Suture removal 12-14 days.  Patient Education     Extremity Laceration: Stitches, Staples, or Tape  A laceration is a cut through the skin. If it is deep, it may require stitches or staples to close so it can heal. Minor cuts may be treated with surgical tape closures, or skin glue.  X-rays may be done if something may have entered the skin through the cut. You may also need a tetanus shot if you are not up to date on this vaccine.  Home care    Follow the healthcare provider s instructions on how to care for the cut.    Wash your hands with soap and warm water before and after caring for your wound. This is to help prevent infection.    Keep the wound clean and dry. If a bandage was applied and it becomes wet or dirty, replace it. Otherwise, leave it in place for the first 24 hours, then change it once a day or as directed.    If stitches or staples were used, clean the wound daily:  ? After removing the bandage, wash the area with soap and water. Use a wet cotton swab to loosen and remove any blood or crust that forms.  ? After cleaning, keep the wound clean and dry. Talk with your healthcare provider before putting any antibiotic ointment on the wound. Reapply the bandage.    You may remove the bandage to shower as usual after the first 24 hours, but don't soak the area in water (no swimming) until the stitches or staples are removed.    If surgical tape closures were used, keep the area clean and dry. If it becomes wet, blot it dry with a towel. Let the surgical tape fall off on its own.    The healthcare provider may prescribe an antibiotic cream or ointment to prevent infection. He or she may also prescribe an antibiotic pill. Don't stop taking this medicine until you have finished it all or the provider tells you to stop.    The provider may also prescribe medicine for  pain. Follow the instructions for taking these medicines.    Don't do activities that may reopen your wound.  Follow-up care  Follow up with your healthcare provider, or as advised. Most skin wounds heal within 10 days. But an infection may sometimes occur even with proper treatment. Check the wound daily for the signs of infection listed below. Stitches and staples should be removed within 7 to14 days. If surgical tape closures were used, you may remove them after 10 days if they have not fallen off by then.   When to seek medical advice  Call your healthcare provider right away if any of these occur:    Wound bleeding not controlled by direct pressure    Signs of infection, including increasing pain in the wound, increasing wound redness or swelling, or pus or bad odor coming from the wound    Fever of 100.4 F (38 C) or higher, or as directed by your healthcare provider    Stitches or staples come apart or fall out or surgical tape falls off before 7 days    Wound edges reopen    Wound changes colors    Numbness occurs around the wound     Decreased movement around the injured area  Date Last Reviewed: 7/1/2017 2000-2019 [employer]. [address] [place] [address], [place] [address]. All rights reserved. This information is not intended as a substitute for professional medical care. Always follow your healthcare professional's instructions.

## 2020-08-23 ENCOUNTER — TELEPHONE (OUTPATIENT)
Dept: NURSING | Facility: CLINIC | Age: 60
End: 2020-08-23

## 2020-08-23 ENCOUNTER — TELEPHONE (OUTPATIENT)
Dept: FAMILY MEDICINE | Facility: CLINIC | Age: 60
End: 2020-08-23

## 2020-08-23 RX ORDER — HYDROCODONE BITARTRATE AND ACETAMINOPHEN 5; 325 MG/1; MG/1
1 TABLET ORAL EVERY 6 HOURS PRN
Qty: 9 TABLET | Refills: 0 | Status: SHIPPED | OUTPATIENT
Start: 2020-08-23 | End: 2020-08-26

## 2020-08-23 NOTE — TELEPHONE ENCOUNTER
Reason for call:  Other   Patient called regarding (reason for call): call back and prescription  Additional comments: Doctor's note possibly as well as a prescription for pain medication to be able to sleep    Phone number to reach patient:  Cell number on file:    Telephone Information:   Mobile [phone]       Best Time:  Anytime    Can we leave a detailed message on this number?  YES    Travel screening: Not Applicable

## 2020-08-23 NOTE — TELEPHONE ENCOUNTER
I called the patient.  She would like some pain medication as it is difficult to sleep.  Advil did not seem to help.  She would also like a work slip excusing her from work tomorrow.  She works at [employer] and is on her feet all day.  I will put the work slip and envelope with her name on it for her to  at the .  Also will cover her with antibiotic Augmentin and give her prescription for Norco.  I offered postop shoe boot but she declines and says she has some of these at home.      Jaylyn Amezquita PA-C

## 2020-08-23 NOTE — TELEPHONE ENCOUNTER
61 y/o female calls about stitches on her RIGHT foot selvin and shin from slipping and falling onto an metal floor vent - stitches placed in Urgent Care. Calls this morning because it is painful, she has been using Advil, advised Tylenol, cold and elevation, declines, looking for something stronger for the pain. Advised patient that this line does not do anything stronger than Tylenol, would need to be reseen today in Urgent care for something stronger, otherwise wait until Monday when providers back in clinic. Declines Additional triage.    She requests MD call her on Monday? For something stronger for the pain in her foot, told her that physician will need to triage her again on Monday, she would like to be called back at [phone]    Pharmacy on file is correct  [id number] - [facility] [address] [place] AT [facility]  [address]  [phone]    Sallie Kendrick RN - [place] Nurse Advisor  8/23/2020  5:16AM

## 2020-09-28 ENCOUNTER — OFFICE VISIT (OUTPATIENT)
Dept: URGENT CARE | Facility: URGENT CARE | Age: 60
End: 2020-09-28
Payer: COMMERCIAL

## 2020-09-28 VITALS
SYSTOLIC BLOOD PRESSURE: 140 MMHG | DIASTOLIC BLOOD PRESSURE: 82 MMHG | TEMPERATURE: 98.1 F | OXYGEN SATURATION: 98 % | WEIGHT: 123 LBS | RESPIRATION RATE: 22 BRPM | BODY MASS INDEX: 22.68 KG/M2 | HEART RATE: 105 BPM

## 2020-09-28 DIAGNOSIS — Z71.6 ENCOUNTER FOR SMOKING CESSATION COUNSELING: ICD-10-CM

## 2020-09-28 DIAGNOSIS — M62.830 LUMBAR PARASPINAL MUSCLE SPASM: Primary | ICD-10-CM

## 2020-09-28 PROCEDURE — 99214 OFFICE O/P EST MOD 30 MIN: CPT | Performed by: FAMILY MEDICINE

## 2020-09-28 RX ORDER — HYDROCODONE BITARTRATE AND ACETAMINOPHEN 5; 325 MG/1; MG/1
1-2 TABLET ORAL EVERY 6 HOURS PRN
Qty: 15 TABLET | Refills: 0 | Status: SHIPPED | OUTPATIENT
Start: 2020-09-28 | End: 2020-10-03

## 2020-09-28 RX ORDER — CYCLOBENZAPRINE HCL 10 MG
10 TABLET ORAL AT BEDTIME
Qty: 10 TABLET | Refills: 1 | Status: SHIPPED | OUTPATIENT
Start: 2020-09-28 | End: 2020-10-05

## 2020-09-28 RX ORDER — NICOTINE 21 MG/24HR
1 PATCH, TRANSDERMAL 24 HOURS TRANSDERMAL EVERY 24 HOURS
Qty: 30 PATCH | Refills: 0 | Status: SHIPPED | OUTPATIENT
Start: 2020-09-28 | End: 2020-10-05

## 2020-09-28 ASSESSMENT — ENCOUNTER SYMPTOMS
DIARRHEA: 0
HEADACHES: 0
FEVER: 0
RHINORRHEA: 0
SHORTNESS OF BREATH: 0
SORE THROAT: 0
CHILLS: 0
VOMITING: 0
COUGH: 0
NAUSEA: 0

## 2020-09-28 ASSESSMENT — PAIN SCALES - GENERAL: PAINLEVEL: WORST PAIN (10)

## 2020-09-28 NOTE — PROGRESS NOTES
SUBJECTIVE:   Paco Medrano is a 60 year old female presenting with a chief complaint of   Chief Complaint   Patient presents with     Back Pain     Patient has been having back pain since yesterday- unsure why the pain started, possibly due to carrying in groceries- pain is in the left lower back, pain is not going down the legs, or up the back. Patient describes the pain as sharp/spasm.       She is an established patient of [place] [facility].      Dilip is a 60-year-old female concerned with lumbar right-sided area back pain over the past 24 hours she denies any radicular symptoms, weakness,numbness, tingling or recent fever.  Rare history of back pain in the past.      Review of Systems   Constitutional: Negative for chills and fever.   HENT: Negative for congestion, ear pain, rhinorrhea and sore throat.    Respiratory: Negative for cough and shortness of breath.    Gastrointestinal: Negative for diarrhea, nausea and vomiting.   Neurological: Negative for headaches.       No past medical history on file.  Family History   Problem Relation Age of Onset     Glaucoma Mother      Hypertension Mother      Hypertension Father      Cancer Sister      Diabetes Daughter      Current Outpatient Medications   Medication Sig Dispense Refill     ibuprofen (ADVIL/MOTRIN) 200 MG tablet Take 200 mg by mouth every 4 hours as needed for mild pain       zolpidem (AMBIEN) 5 MG tablet TAKE 1/2 TO 1 TABLET(2.5 TO 5 MG) BY MOUTH EVERY NIGHT AT BEDTIME AS NEEDED FOR SLEEP 30 tablet 4     loratadine (CLARITIN) 10 MG tablet Take 10 mg by mouth daily       Social History     Tobacco Use     Smoking status: Current Every Day Smoker     Start date: 1/9/2012     Smokeless tobacco: Never Used   Substance Use Topics     Alcohol use: Yes     Alcohol/week: 0.0 standard drinks       OBJECTIVE  BP (!) 140/82   Pulse 105   Temp 98.1  F (36.7  C) (Tympanic)   Resp 22   Wt 55.8 kg (123 lb)   SpO2 98%   BMI 22.68 kg/m      Physical Exam  Vitals signs  reviewed.   HENT:      Head: Normocephalic and atraumatic.      Right Ear: External ear normal.      Left Ear: External ear normal.      Nose: Nose normal.      Mouth/Throat:      Pharynx: No oropharyngeal exudate.   Eyes:      General: No scleral icterus.        Right eye: No discharge.         Left eye: No discharge.      Conjunctiva/sclera: Conjunctivae normal.      Pupils: Pupils are equal, round, and reactive to light.   Neck:      Musculoskeletal: Neck supple.      Thyroid: No thyromegaly.      Trachea: No tracheal deviation.   Cardiovascular:      Rate and Rhythm: Normal rate and regular rhythm.      Heart sounds: Normal heart sounds. No murmur. No friction rub. No gallop.    Pulmonary:      Effort: Pulmonary effort is normal. No respiratory distress.      Breath sounds: Normal breath sounds. No stridor. No wheezing or rales.   Chest:      Chest wall: No tenderness.   Abdominal:      General: Bowel sounds are normal. There is no distension.      Palpations: Abdomen is soft. There is no mass.      Tenderness: There is no abdominal tenderness. There is no guarding or rebound.   Musculoskeletal:         General: Tenderness (Right lower lumbar paraspinal muscles only into the gluteal area only) present. No deformity.      Comments: Straight leg raise is negative although she does have increasing pain with motion sitting to standing and laying to sitting.   Lymphadenopathy:      Cervical: No cervical adenopathy.   Skin:     General: Skin is warm and dry.      Capillary Refill: Capillary refill takes less than 2 seconds.      Findings: No erythema or rash.   Neurological:      General: No focal deficit present.      Mental Status: She is alert and oriented to person, place, and time.      Cranial Nerves: No cranial nerve deficit.      Sensory: No sensory deficit.      Motor: No weakness.      Gait: Gait abnormal (Minimally antalgic due to pain.).   Psychiatric:         Judgment: Judgment normal.            ASSESSMENT:    ICD-10-CM    1. Lumbar paraspinal muscle spasm  M62.830 cyclobenzaprine (FLEXERIL) 10 MG tablet     HYDROcodone-acetaminophen (NORCO) 5-325 MG tablet   2. Encounter for smoking cessation counseling  Z71.6 nicotine (NICODERM CQ) 14 MG/24HR 24 hr patch     nicotine (NICODERM CQ) 7 MG/24HR 24 hr patch   Proper use of NicoDerm patch daily was described as well as baseline use of ibuprofen with additional Tylenol or the above Percocet short-term for breakthrough pain    PLAN:  Without a known injury prior history of back pain I would expect her to resolve promptly within 2 to 6 weeks time I did provide a work note as requested to limit lifting or painful activity she is able to continue to work and is agreeable to this plan.  Commended her on her next steps in smoking cessation did provide NicoDerm patch to assist her. she will stop smoking completely over the next 10 days continue the NicoDerm patch for at least 2 months apparently smoking less than 1/2 pack daily currently.  The patient indicates understanding of these issues and agrees with the plan.   Patient educational/instructional material provided including reasons for follow-up   John Loepz MD

## 2020-09-28 NOTE — LETTER
[address] [address]  Phone: [phone]    September 28, 2020        Paco Medrano  [address] [place]  [facility] [address]          To whom it may concern:    RE: Paco Medrano    Patient was seen and treated today at our clinic.  She may return to work however should limit any lifting of more than 5 to 10 pounds and certainly any painful lifting over the next 2 to 3 weeks possibly somewhat longer.    Her exam is consistent with a lower lumbar area back strain would expect her to heal completely over 6 weeks time but may be gradual and therefore should use her discretion and avoid any lifting that is painful and certainly is proper lifting technique over the next 3 to 6 weeks.  She may return to full duty later lifting proper lifting when her back pain improves.    If this persists she should consider repeat evaluation with her primary care doctor and/or consideration of further imaging studies in conjunction with her primary care doctor.      Sincerely,        John Lopez MD

## 2020-09-29 DIAGNOSIS — F51.04 PSYCHOPHYSIOLOGICAL INSOMNIA: ICD-10-CM

## 2020-10-04 ENCOUNTER — NURSE TRIAGE (OUTPATIENT)
Dept: NURSING | Facility: CLINIC | Age: 60
End: 2020-10-04

## 2020-10-04 NOTE — TELEPHONE ENCOUNTER
Additional Information    Negative: Passed out (i.e., lost consciousness, collapsed and was not responding)    Negative: Shock suspected (e.g., cold/pale/clammy skin, too weak to stand, low BP, rapid pulse)    Negative: Sounds like a life-threatening emergency to the triager    Negative: Major injury to the back (e.g., MVA, fall > 10 feet or 3 meters, penetrating injury, etc.)    Negative: Followed a tailbone injury    Negative: [1] Pain in the upper back over the ribs (rib cage) AND [2] radiates (travels, goes) into chest    Negative: [1] Pain in the upper back over the ribs (rib cage) AND [2] worsened by coughing (or clearly increases with breathing)    Negative: Back pain during pregnancy    Negative: Pain mainly in flank (i.e., in the side, over the lower ribs or just below the ribs)    Negative: [1] SEVERE back pain (e.g., excruciating) AND [2] sudden onset AND [3] age > 60    Negative: [1] Unable to urinate (or only a few drops) > 4 hours AND [2] bladder feels very full (e.g., palpable bladder or strong urge to urinate)    Negative: [1] Loss of bladder or bowel control (urine or bowel incontinence; wetting self, leaking stool) AND [2] new onset    Negative: Numbness in groin or rectal area (i.e., loss of sensation)    Negative: [1] SEVERE abdominal pain AND [2] present > 1 hour    Negative: [1] Abdominal pain AND [2] age > 60    Negative: Weakness of a leg or foot (e.g., unable to bear weight, dragging foot)    Negative: Unable to walk    Negative: Patient sounds very sick or weak to the triager    Negative: [1] SEVERE back pain (e.g., excruciating, unable to do any normal activities) AND [2] not improved 2 hours after pain medicine    Negative: [1] Pain radiates into the thigh or further down the leg AND [2] both legs    Negative: [1] Fever > 100.0 F (37.8 C) AND [2] flank pain (i.e., in side, below ribs and above hip)    Negative: [1] Pain or burning with passing urine (urination) AND [2] flank pain  "(i.e., in side, below ribs and above hip)    Negative: Numbness in a leg or foot (i.e., loss of sensation)    Negative: [1] Numbness in an arm or hand (i.e., loss of sensation) AND [2] upper back pain    Negative: High-risk adult (e.g., history of cancer, HIV, or IV drug abuse)    Negative: [1] Fever AND [2] no symptoms of UTI  (Exception: has generalized muscle pains, not localized back pain)    Negative: Rash in same area as pain (may be described as \"small blisters\")    Negative: Blood in urine (red, pink, or tea-colored)    [1] MODERATE back pain (e.g., interferes with normal activities) AND [2] present > 3 days    Answer Assessment - Initial Assessment Questions  1. ONSET: \"When did the pain begin?\"       A week ago  2. LOCATION: \"Where does it hurt?\" (upper, mid or lower back)      Low back  3. SEVERITY: \"How bad is the pain?\"  (e.g., Scale 1-10; mild, moderate, or severe)    - MILD (1-3): doesn't interfere with normal activities     - MODERATE (4-7): interferes with normal activities or awakens from sleep     - SEVERE (8-10): excruciating pain, unable to do any normal activities       Moderate to severe at times  4. PATTERN: \"Is the pain constant?\" (e.g., yes, no; constant, intermittent)       Comes and goes  5. RADIATION: \"Does the pain shoot into your legs or elsewhere?\"      no  6. CAUSE:  \"What do you think is causing the back pain?\"       lifting  7. BACK OVERUSE:  \"Any recent lifting of heavy objects, strenuous work or exercise?\"      groceries  8. MEDICATIONS: \"What have you taken so far for the pain?\" (e.g., nothing, acetaminophen, NSAIDS)      Alternating ibuprofen with vicodin but almost out of pain meds  9. NEUROLOGIC SYMPTOMS: \"Do you have any weakness, numbness, or problems with bowel/bladder control?\"      no  10. OTHER SYMPTOMS: \"Do you have any other symptoms?\" (e.g., fever, abdominal pain, burning with urination, blood in urine)        no  11. PREGNANCY: \"Is there any chance you are " "pregnant?\" (e.g., yes, no; LMP)        no    Protocols used: BACK PAIN-A-AH      "

## 2020-10-05 ENCOUNTER — OFFICE VISIT (OUTPATIENT)
Dept: FAMILY MEDICINE | Facility: CLINIC | Age: 60
End: 2020-10-05
Payer: COMMERCIAL

## 2020-10-05 VITALS
BODY MASS INDEX: 23.19 KG/M2 | DIASTOLIC BLOOD PRESSURE: 86 MMHG | OXYGEN SATURATION: 98 % | TEMPERATURE: 97.9 F | HEIGHT: 62 IN | SYSTOLIC BLOOD PRESSURE: 129 MMHG | HEART RATE: 108 BPM | WEIGHT: 126 LBS

## 2020-10-05 DIAGNOSIS — M54.50 LUMBAR BACK PAIN: Primary | ICD-10-CM

## 2020-10-05 PROCEDURE — 99214 OFFICE O/P EST MOD 30 MIN: CPT | Performed by: FAMILY MEDICINE

## 2020-10-05 ASSESSMENT — ANXIETY QUESTIONNAIRES
2. NOT BEING ABLE TO STOP OR CONTROL WORRYING: NOT AT ALL
5. BEING SO RESTLESS THAT IT IS HARD TO SIT STILL: NOT AT ALL
7. FEELING AFRAID AS IF SOMETHING AWFUL MIGHT HAPPEN: SEVERAL DAYS
6. BECOMING EASILY ANNOYED OR IRRITABLE: NOT AT ALL
1. FEELING NERVOUS, ANXIOUS, OR ON EDGE: SEVERAL DAYS
3. WORRYING TOO MUCH ABOUT DIFFERENT THINGS: SEVERAL DAYS
GAD7 TOTAL SCORE: 4

## 2020-10-05 ASSESSMENT — PATIENT HEALTH QUESTIONNAIRE - PHQ9
SUM OF ALL RESPONSES TO PHQ QUESTIONS 1-9: 2
5. POOR APPETITE OR OVEREATING: SEVERAL DAYS

## 2020-10-05 ASSESSMENT — MIFFLIN-ST. JEOR: SCORE: 1090.81

## 2020-10-05 ASSESSMENT — PAIN SCALES - GENERAL: PAINLEVEL: WORST PAIN (10)

## 2020-10-05 NOTE — PROGRESS NOTES
"Subjective     Paco Medrano is a 60 year old female who presents to clinic today for the following health issues:    HPI        Lifted a bag of groceries out of the car which triggered the pain 8 days ago.  Seen in UC and given vicodin and flexeril. Talking advil 600 - 800 and stand 8 hours for job.  Patient works standing no her feet for 8 hours and is requesting more vicodin to help her manage her work.  Denies radicular symptoms but having pain with bending and twisting.    Review of Systems   Constitutional, HEENT, cardiovascular, pulmonary, gi and gu systems are negative, except as otherwise noted.      Objective    /86 (BP Location: Left arm, Patient Position: Chair, Cuff Size: Adult Regular)   Pulse 108   Temp 97.9  F (36.6  C) (Tympanic)   Ht 1.568 m (5' 1.75\")   Wt 57.2 kg (126 lb)   SpO2 98%   Breastfeeding No   BMI 23.23 kg/m    Body mass index is 23.23 kg/m .  Physical Exam   GENERAL: healthy, alert and no distress  NECK: no adenopathy, no asymmetry, masses, or scars and thyroid normal to palpation  RESP: lungs clear to auscultation - no rales, rhonchi or wheezes  CV: regular rate and rhythm, normal S1 S2, no S3 or S4, no murmur, click or rub, no peripheral edema and peripheral pulses strong  ABDOMEN: soft, nontender, no hepatosplenomegaly, no masses and bowel sounds normal  MS: right lumbar paraspinal   NEURO: Normal strength and tone, mentation intact and speech normal    Office Visit on 06/08/2019   Component Date Value Ref Range Status     Specimen Description 06/08/2019 Throat   Final     Rapid Strep A Screen 06/08/2019 NEGATIVE: No Group A streptococcal antigen detected by immunoassay, await culture report.   Final     Specimen Description 06/08/2019 Throat   Final     Culture Micro 06/08/2019 No beta hemolytic Streptococcus Group A isolated   Final           Assessment & Plan     Lumbar back pain  Discussed narcotics for acute pain only and not subacute.  Offered rx NSAIDs but " refused. Trial of scheduled tizanidine and work note given to restrict standing to 4 hours per day.  - tiZANidine (ZANAFLEX) 4 MG tablet; Take 1 tablet (4 mg) by mouth 3 times daily as needed for muscle spasms     Tobacco Cessation:   reports that she has been smoking. She started smoking about 8 years ago. She has never used smokeless tobacco.         The uses and side effects, including black box warnings as appropriate, were discussed in detail.  All patient questions were answered.  The patient was instructed to call immediately if any side effects developed.     Return in about 2 weeks (around 10/19/2020), or if symptoms worsen or fail to improve.    Rosalia Alejandre MD  [facility]

## 2020-10-05 NOTE — PATIENT INSTRUCTIONS
At [facility], we strive to deliver an exceptional experience to you, every time we see you. If you receive a survey, please complete it as we do value your feedback.  If you have MyChart, you can expect to receive results automatically within 24 hours of their completion.  Your provider will send a note interpreting your results as well.   If you do not have MyChart, you should receive your results in about a week by mail.    Your care team:                            Family Medicine Internal Medicine   MD Colton Brito, MD Remy Quintanilla MD Katya Georgiev PA-C Megan Hill, APRN CNP    Rome Leigh, MD Pediatrics   [place], PAFrancescaC  Bonnie Joaquin, CNP MD Marcy Delarosa APRN CNP   MD Yesenia Navarrete MD Deborah Mielke, MD Aretha Taylor, APRN CNP  Noris Eng, PAPASTOR Constantino, CNP  MD Minal Cordon MD Angela Wermerskirchen, MD      Clinic hours: Monday - Thursday 7 am-7 pm; Fridays 7 am-5 pm.   Urgent care: Monday - Friday 11 am-9 pm; Saturday and Sunday 9 am-5 pm.    Clinic: [phone]       [facility]: Monday - Thursday 8 am - 7 pm; Friday 8 am - 6 pm  [facility]: [phone]     Use www.[email or web address].org for 24/7 diagnosis and treatment of dozens of conditions.  Patient Education     Exercises to Strengthen Your Lower Back  Strong lower back and abdominal muscles work together to support your spine. The exercises below will help strengthen the lower back. It is important that you begin exercising slowly and increase levels gradually.  Always begin any exercise program with stretching. If you feel pain while doing any of these exercises, stop and talk to your doctor about a more specific exercise program that better suits your condition.   Low back stretch  The point of stretching is to make you more flexible and  increase your range of motion. Stretch only as much as you are able. Stretch slowly. Do not push your stretch to the limit. If at any point you feel pain while stretching, this is your (temporary) limit.    Lie on your back with your knees bent and both feet on the ground.    Slowly raise your left knee to your chest as you flatten your lower back against the floor. Hold for 5 seconds.    Relax and repeat the exercise with your right knee.    Do 10 of these exercises for each leg.    Repeat hugging both knees to your chest at the same time.  Building lower back strength  Start your exercise routine with 10 to 30 minutes a day, 1 to 3 times a day.  Initial exercises  Lying on your back:  1. Ankle pumps: Move your foot up and down, towards your head, and then away. Repeat 10 times with each foot.  2. Heel slides: Slowly bend your knee, drawing the heel of your foot towards you. Then slide your heel/foot from you, straightening your knee. Do not lift your foot off the floor (this is not a leg lift).  3. Abdominal contraction: Bend your knees and put your hands on your stomach. Tighten your stomach muscles. Hold for 5 seconds, then relax. Repeat 10 times.  4. Straight leg raise: Bend one leg at the knee and keep the other leg straight. Tighten your stomach muscles. Slowly lift your straight leg 6 to 12 inches off the floor and hold for up to 5 seconds. Repeat 10 times on each side.  Standin. Wall squats: Stand with your back against the wall. Move your feet about 12 inches away from the wall. Tighten your stomach muscles, and slowly bend your knees until they are at about a 45 degree angle. Do not go down too far. Hold about 5 seconds. Then slowly return to your starting position. Repeat 10 times.  2. Heel raises: Stand facing the wall. Slowly raise the heels of your feet up and down, while keeping your toes on the floor. If you have trouble balancing, you can touch the wall with your hands. Repeat 10 times.  More  advanced exercises  When you feel comfortable enough, try these exercises.  1. Kneeling lumbar extension: Begin on your hands and knees. At the same time, raise and straighten your right arm and left leg until they are parallel to the ground. Hold for 2 seconds and come back slowly to a starting position. Repeat with left arm and right leg, alternating 10 times.  2. Prone lumbar extension: Lie face down, arms extended overhead, palms on the floor. At the same time, raise your right arm and left leg as high as comfortably possible. Hold for 10 seconds and slowly return to start. Repeat with left arm and right leg, alternating 10 times. Gradually build up to 20 times. (Advanced: Repeat this exercise raising both arms and both legs a few inches off the floor at the same time. Hold for 5 seconds and release.)  3. Pelvic tilt: Lie on the floor on your back with your knees bent at 90 degrees. Your feet should be flat on the floor. Inhale, exhale, then slowly contract your abdominal muscles bringing your navel toward your spine. Let your pelvis rock back until your lower back is flat on the floor. Hold for 10 seconds while breathing smoothly.  4. Abdominal crunch: Perform a pelvic tilt (above) flattening your lower back against the floor. Holding the tension in your abdominal muscles, take another breath and raise your shoulder blades off the ground (this is not a full sit-up). Keep your head in line with your body (don t bend your neck forward). Hold for 2 seconds, then slowly lower.  Date Last Reviewed: 6/1/2016 2000-2019 [employer]. [address] [place] [address], [place] [id number]. All rights reserved. This information is not intended as a substitute for professional medical care. Always follow your healthcare professional's instructions.

## 2020-10-05 NOTE — LETTER
October 5, 2020      Paco Medrano  [address]  [place] [facility] [address]        To Whom It May Concern:    Paco Medrano was seen in our clinic. She may return to work with the following: limited to 4 hour of standing during workday until 10/18/2020.      Sincerely,        Rosalia Alejandre MD

## 2020-10-06 ASSESSMENT — ANXIETY QUESTIONNAIRES: GAD7 TOTAL SCORE: 4

## 2020-10-07 RX ORDER — ZOLPIDEM TARTRATE 5 MG/1
2.5-5 TABLET ORAL
Qty: 30 TABLET | Refills: 5 | Status: SHIPPED | OUTPATIENT
Start: 2020-10-07 | End: 2021-04-07

## 2020-10-08 ENCOUNTER — VIRTUAL VISIT (OUTPATIENT)
Dept: FAMILY MEDICINE | Facility: CLINIC | Age: 60
End: 2020-10-08
Payer: COMMERCIAL

## 2020-10-08 DIAGNOSIS — M54.50 ACUTE RIGHT-SIDED LOW BACK PAIN WITHOUT SCIATICA: Primary | ICD-10-CM

## 2020-10-08 PROCEDURE — 99213 OFFICE O/P EST LOW 20 MIN: CPT | Mod: 95 | Performed by: FAMILY MEDICINE

## 2020-10-08 RX ORDER — HYDROCODONE BITARTRATE AND ACETAMINOPHEN 5; 325 MG/1; MG/1
.5-1 TABLET ORAL EVERY 6 HOURS PRN
Qty: 16 TABLET | Refills: 0 | Status: SHIPPED | OUTPATIENT
Start: 2020-10-08 | End: 2021-06-15

## 2020-10-08 RX ORDER — MELOXICAM 7.5 MG/1
7.5 TABLET ORAL
Qty: 30 TABLET | Refills: 1 | Status: SHIPPED | OUTPATIENT
Start: 2020-10-08 | End: 2022-06-22

## 2020-10-08 NOTE — PROGRESS NOTES
"Paco Medrano is a 60 year old female who is being evaluated via a billable telephone visit.      The patient has been notified of following:     \"This telephone visit will be conducted via a call between you and your physician/provider. We have found that certain health care needs can be provided without the need for a physical exam.  This service lets us provide the care you need with a short phone conversation.  If a prescription is necessary we can send it directly to your pharmacy.  If lab work is needed we can place an order for that and you can then stop by our lab to have the test done at a later time.    Telephone visits are billed at different rates depending on your insurance coverage. During this emergency period, for some insurers they may be billed the same as an in-person visit.  Please reach out to your insurance provider with any questions.    If during the course of the call the physician/provider feels a telephone visit is not appropriate, you will not be charged for this service.\"    Patient has given verbal consent for Telephone visit?  Yes    What phone number would you like to be contacted at? [phone]    How would you like to obtain your AVS? Mail a copy    Subjective     Paco Medrano is a 60 year old female who presents via phone visit today for the following health issues:    HPI       Follow up on lower right back pain. Patient states Ibuprofen has not helped and she not been sleeping good due to pain. She has tried icy hot and muscle tension bath soak but she is still having sharp pain.    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  The hydrocodone she was given in urgent care provided some relief.  She stretched out the supply of pills to last for 8 days or so    Alleviating factors:   Improved by: rest and sitting      Precipitating factors:  Worsened by: Bending, Standing, Lying Flat and Walking        Accompanying Signs & Symptoms:  Risk of Fracture:  None  Risk of Cauda Equina:  None  Risk of Infection:  None  Risk of Cancer:  None  Risk of Ankylosing Spondylitis:  Onset at age <35, male, AND morning back stiffness. no     Past medical, family, and social histories, medications, and allergies are reviewed and updated in Epic.  Allergies: Azithromycin, Erythromycin, and Septra [sulfamethoxazole w-trimethoprim]      Objective:         There were no vitals taken for this visit., since this is a telephone visit.  healthy, alert and no distress  RESP: No cough, no audible wheezing, able to talk in full sentences.  PSYCH: Alert and oriented times 3; coherent speech, normal   rate and volume, able to articulate logical thoughts, able   to abstract reason, no tangential thoughts, no hallucinations   or delusions, and affect is normal  The remainder of the exam cannot be completed due to this being a telephone visit.        =====    ASSESSMENT/PLAN:  (M54.5) Acute right-sided low back pain without sciatica  (primary encounter diagnosis)  Comment:   Plan: MR Lumbar Spine w/o Contrast, RAQUEL PT, HAND, AND        CHIROPRACTIC REFERRAL, meloxicam (MOBIC) 7.5 MG        tablet, HYDROcodone-acetaminophen (NORCO) 5-325        MG tablet, [facility]          Referral        The patient voices her  understanding that the hydrocodone prescription is a nonrefillable prescription.  She also voices her understanding that her insurance may limit coverage regarding the requested MRI if it is done too early in the course of her illness. Return in about 1 month (around 11/9/2020) for recheck if symptoms fail to resolve by then.      Phone call duration:  22 minutes    Rodney Desai MD

## 2020-10-08 NOTE — PATIENT INSTRUCTIONS
Please call [facility]: [phone] ([place] or [place]) or [facility]: [phone] ([place], [facility]) to schedule your back MRI.

## 2020-10-27 ENCOUNTER — TELEPHONE (OUTPATIENT)
Dept: FAMILY MEDICINE | Facility: CLINIC | Age: 60
End: 2020-10-27

## 2020-10-27 NOTE — TELEPHONE ENCOUNTER
Patient needs a visit of some sort. Please offer her virtual, telephone visit or E-visit.       Macy Crabtree RN, BSN, PHN

## 2020-10-27 NOTE — TELEPHONE ENCOUNTER
Reason for Call:  Other prescription    Detailed comments: Paco has a sinus infection and wants to know if you can call in an antibiotic for her. She does not want to have to come into the clinic.     Phone Number Patient can be reached at: Home number on file [phone] (home)    Best Time: Any    Can we leave a detailed message on this number? YES    Call taken on 10/27/2020 at 12:39 PM by Terri Palencia

## 2020-10-28 ENCOUNTER — VIRTUAL VISIT (OUTPATIENT)
Dept: FAMILY MEDICINE | Facility: CLINIC | Age: 60
End: 2020-10-28
Payer: COMMERCIAL

## 2020-10-28 DIAGNOSIS — J01.01 ACUTE RECURRENT MAXILLARY SINUSITIS: Primary | ICD-10-CM

## 2020-10-28 PROCEDURE — 99213 OFFICE O/P EST LOW 20 MIN: CPT | Mod: 95 | Performed by: FAMILY MEDICINE

## 2020-10-28 RX ORDER — DOXYCYCLINE 100 MG/1
100 CAPSULE ORAL 2 TIMES DAILY
Qty: 20 CAPSULE | Refills: 0 | Status: SHIPPED | OUTPATIENT
Start: 2020-10-28 | End: 2020-11-03

## 2020-10-28 NOTE — PROGRESS NOTES
"Paco Medrano is a 60 year old female who is being evaluated via a billable telephone visit.      The patient has been notified of following:     \"This telephone visit will be conducted via a call between you and your physician/provider. We have found that certain health care needs can be provided without the need for a physical exam.  This service lets us provide the care you need with a short phone conversation.  If a prescription is necessary we can send it directly to your pharmacy.  If lab work is needed we can place an order for that and you can then stop by our lab to have the test done at a later time.    Telephone visits are billed at different rates depending on your insurance coverage. During this emergency period, for some insurers they may be billed the same as an in-person visit.  Please reach out to your insurance provider with any questions.    If during the course of the call the physician/provider feels a telephone visit is not appropriate, you will not be charged for this service.\"    Patient has given verbal consent for Telephone visit?  Yes    What phone number would you like to be contacted at? [phone]    How would you like to obtain your AVS? Mail a copy    Subjective     Paco Medrano is a 60 year old female who presents via phone visit today for the following health issues:    HPI     Acute Illness  Acute illness concerns: facial pressure  Onset/Duration: 2 weeks  Symptoms:  Fever: no  Chills/Sweats: no  Headache (location?): no  Sinus Pressure: YES  Conjunctivitis:  no  Ear Pain: no  Rhinorrhea: no  Congestion: YES  Sore Throat: no  Cough: no  Wheeze: no  Decreased Appetite: no  Nausea: no  Vomiting: no  Diarrhea: no  Dysuria/Freq.: no  Dysuria or Hematuria: no  Fatigue/Achiness: no  Sick/Strep Exposure: no  Therapies tried and outcome: None    Review of Systems   Constitutional, HEENT, cardiovascular, pulmonary, GI, , musculoskeletal, neuro, skin, endocrine and psych systems " are negative, except as otherwise noted.       Objective          Vitals:  No vitals were obtained today due to virtual visit.    healthy, alert and no distress  PSYCH: Alert and oriented times 3; coherent speech, normal   rate and volume, able to articulate logical thoughts, able   to abstract reason, no tangential thoughts, no hallucinations   or delusions  Her affect is normal  RESP: No cough, no audible wheezing, able to talk in full sentences  Remainder of exam unable to be completed due to telephone visits          Assessment/Plan:    Assessment & Plan     Acute recurrent maxillary sinusitis  Cover with doxycycline. History of frequent recurrence. Referred to ENT for evaluation and recommendations.  - OTOLARYNGOLOGY REFERRAL  - doxycycline monohydrate (MONODOX) 100 MG capsule; Take 1 capsule (100 mg) by mouth 2 times daily for 10 days     Tobacco Cessation:   reports that she has been smoking. She started smoking about 8 years ago. She has never used smokeless tobacco.  Tobacco Cessation Action Plan: Information offered: Patient not interested at this time         See Patient Instructions    Return in about 6 months (around 4/28/2021) for Physical Exam.    Rome Leigh MD, MD  [facility]    Phone call duration:  11 minutes

## 2020-11-02 ENCOUNTER — TELEPHONE (OUTPATIENT)
Dept: FAMILY MEDICINE | Facility: CLINIC | Age: 60
End: 2020-11-02

## 2020-11-02 DIAGNOSIS — J01.01 ACUTE RECURRENT MAXILLARY SINUSITIS: ICD-10-CM

## 2020-11-02 NOTE — TELEPHONE ENCOUNTER
Reason for Call:  Other prescription    Detailed comments: Had a virtual appointment on 10/28 and given antibiotic. She is in [place] and is wondering if there is an antibiotic that could be prescribed that would not make her skin so sensitive to the sun.  Please call to advise if this is something that would be possible Thank you     Phone Number Patient can be reached at: Home number on file [phone] (home)    Best Time: Any    Can we leave a detailed message on this number? YES    Call taken on 11/2/2020 at 2:53 PM by Terri Palencia

## 2020-11-03 NOTE — TELEPHONE ENCOUNTER
This writer attempted to contact pt on 11/03/20      Reason for call need what pharmacy and left detailed message.      If patient calls back:   Find out what [place] pharmacy she would like her RX sent to, enter in the pharmacy into encounter, route to nilsa Queen

## 2020-11-03 NOTE — TELEPHONE ENCOUNTER
Please ask patient which pharmacy in [place] does she want us to sent the pended abx, Augmentin, to?    Rome Leigh MD

## 2020-11-03 NOTE — TELEPHONE ENCOUNTER
Pt called back, writer entered in the [facility] pharmacy in [facility] for RX to be sent to

## 2021-01-14 DIAGNOSIS — J01.90 ACUTE SINUSITIS WITH SYMPTOMS > 10 DAYS: ICD-10-CM

## 2021-01-14 RX ORDER — DOXYCYCLINE 100 MG/1
100 CAPSULE ORAL 2 TIMES DAILY
Qty: 20 CAPSULE | Refills: 0 | Status: CANCELLED | OUTPATIENT
Start: 2021-01-14

## 2021-01-14 NOTE — TELEPHONE ENCOUNTER
Routing refill request to provider for review/approval because:  Drug not active on patient's medication list      Macy Crabtree RN, BSN, PHN

## 2021-01-14 NOTE — TELEPHONE ENCOUNTER
Requesting discontinued med:    doxycycline monohydrate (MONODOX) 100 MG capsule (Discontinued) 20 capsule 0 10/28/2020 11/3/2020

## 2021-01-19 ENCOUNTER — VIRTUAL VISIT (OUTPATIENT)
Dept: FAMILY MEDICINE | Facility: CLINIC | Age: 61
End: 2021-01-19
Payer: COMMERCIAL

## 2021-01-19 DIAGNOSIS — J01.90 ACUTE SINUSITIS WITH SYMPTOMS > 10 DAYS: Primary | ICD-10-CM

## 2021-01-19 DIAGNOSIS — J32.9 RECURRENT SINUSITIS: ICD-10-CM

## 2021-01-19 PROCEDURE — 99213 OFFICE O/P EST LOW 20 MIN: CPT | Mod: 95 | Performed by: FAMILY MEDICINE

## 2021-01-19 RX ORDER — CEFUROXIME AXETIL 500 MG/1
500 TABLET ORAL 2 TIMES DAILY
Qty: 20 TABLET | Refills: 0 | Status: SHIPPED | OUTPATIENT
Start: 2021-01-19 | End: 2021-01-29

## 2021-01-19 RX ORDER — MOMETASONE FUROATE MONOHYDRATE 50 UG/1
2 SPRAY, METERED NASAL DAILY
Qty: 17 G | Refills: 0 | Status: SHIPPED | OUTPATIENT
Start: 2021-01-19 | End: 2021-02-26

## 2021-01-19 NOTE — PROGRESS NOTES
Paco Medrano is a 61 year old female who is being evaluated via a billable telephone visit.      What phone number would you like to be contacted at? [phone]    How would you like to obtain your AVS? Mail a copy      Assessment & Plan     Acute sinusitis with symptoms > 10 days    - cefuroxime (CEFTIN) 500 MG tablet; Take 1 tablet (500 mg) by mouth 2 times daily for 10 days for sinus infection.  - mometasone (NASONEX) 50 MCG/ACT nasal spray; Spray 2 sprays into both nostrils daily   Return in about 10 days (around 1/29/2021) for recheck in the office if symptoms fail to resolve by then.    Recurrent sinusitis  The patient is now rested and doing something definitive about her sinus anatomy (if possible).  I would like her to decrease the allergic component as much as possible by the time she sees ENT.  - mometasone (NASONEX) 50 MCG/ACT nasal spray; Spray 2 sprays into both nostrils daily  - OTOLARYNGOLOGY REFERRAL      26 minutes spent on the date of the encounter doing chart review, patient visit and documentation                Return in about 10 days (around 1/29/2021) for recheck in the office if symptoms fail to resolve by then.    Rodney Desai MD  [facility]    SUBJECTIVE:    Paco Medrano is a 61 year old female who prevents via telephone visit today for the following issue(s):    HPI:    RESPIRATORY SYMPTOMS      Duration: started 14 days ago    Description  nasal congestion, rhinorrhea and facial pain/pressure, but especially purulent nasal drainage    Severity: Variable    Accompanying signs and symptoms: None    History (predisposing factors):  none    Precipitating or alleviating factors: None            Past medical, family, and social histories, medications, and allergies are reviewed and updated in Epic.  Allergies: Azithromycin, Erythromycin, and Septra [sulfamethoxazole w-trimethoprim]      Objective:         LMP  (LMP Unknown) , since this is a  telephone visit.  healthy, alert and no distress  RESP: No cough, no audible wheezing, able to talk in full sentences.  PSYCH: Alert and oriented times 3; coherent speech, normal   rate and volume, able to articulate logical thoughts, able   to abstract reason, no tangential thoughts, no hallucinations   or delusions, and affect is normal and pleasant  The remainder of the exam cannot be completed due to this being a telephone visit.          =====      Phone call duration:  11 minutes    Rodney Desai MD

## 2021-02-17 ENCOUNTER — NURSE TRIAGE (OUTPATIENT)
Dept: FAMILY MEDICINE | Facility: CLINIC | Age: 61
End: 2021-02-17

## 2021-02-17 ENCOUNTER — VIRTUAL VISIT (OUTPATIENT)
Dept: FAMILY MEDICINE | Facility: CLINIC | Age: 61
End: 2021-02-17
Payer: COMMERCIAL

## 2021-02-17 DIAGNOSIS — J01.01 ACUTE RECURRENT MAXILLARY SINUSITIS: ICD-10-CM

## 2021-02-17 PROCEDURE — 99213 OFFICE O/P EST LOW 20 MIN: CPT | Mod: 95 | Performed by: FAMILY MEDICINE

## 2021-02-17 RX ORDER — DOXYCYCLINE 100 MG/1
100 CAPSULE ORAL 2 TIMES DAILY
Qty: 28 CAPSULE | Refills: 0 | Status: SHIPPED | OUTPATIENT
Start: 2021-02-17 | End: 2021-03-03

## 2021-02-17 NOTE — PROGRESS NOTES
Paco is a 61 year old who is being evaluated via a billable telephone visit.      How would you like to obtain your AVS? MyChart  If the video visit is dropped, the invitation should be resent by: cell  Will anyone else be joining your video visit? No      Assessment & Plan     Acute recurrent maxillary sinusitis  Failed initial treatment from January - Trial of extended course of doxycyline and continue nasonex. Discussed ENT follow up if not improved.  - doxycycline monohydrate (MONODOX) 100 MG capsule; Take 1 capsule (100 mg) by mouth 2 times daily for 14 days    The uses and side effects, including black box warnings as appropriate, were discussed in detail.  All patient questions were answered.  The patient was instructed to call immediately if any side effects developed.     Return in about 2 weeks (around 3/3/2021), or if symptoms worsen or fail to improve.    Rosalia Alejandre MD  [facility]    Da Antonio is a 61 year old who presents for the following health issues     HPI       Sinus pressure and pain for over a month. Little better with ceftin but not resolved. Using Advil cold and sinus for symptoms.  Using nasonex.      Review of Systems   Constitutional, HEENT, cardiovascular, pulmonary, gi and gu systems are negative, except as otherwise noted.      Objective           Vitals:  No vitals were obtained today due to virtual visit.    Physical Exam   GENERAL: Healthy, alert and no distress  RESP: No audible wheeze, cough, or visible cyanosis.  No visible retractions or increased work of breathing. Sounds congested    PSYCH: Mentation appears normal, affect normal/bright, judgement and insight intact, normal speech and appearance well-groomed.          Visit Details    Type of service:  telephone Visit     Time: 6 minutes    Originating Location (pt. Location): work    Distant Location (provider location):  [facility]      [facility]

## 2021-02-17 NOTE — TELEPHONE ENCOUNTER
S-(situation): Patient had an antibiotic about a month ago for sinus issues. Felt a little better, but has worsened over the last 48 hours.     B-(background): History of many sinus infections    A-(assessment): Patient sounds stuffy, states there is sinus pressure behind her eyes and in her sinus cavity, no nasal drainage. Denies trauma, fever, runny nose.     R-(recommendations): Have patient seen today. Patient scheduled for virtual visit.       Additional Information    Negative: Sounds like a life-threatening emergency to the triager    Negative: Difficulty breathing, and not from stuffy nose (e.g., not relieved by cleaning out the nose)    Negative: SEVERE headache and has fever    Negative: Patient sounds very sick or weak to the triager    Fever present > 3 days (72 hours)    Negative: SEVERE sinus pain    Negative: Severe headache    Negative: Redness or swelling on the cheek, forehead, or around the eye    Negative: Fever > 103 F (39.4 C)    Negative: Fever > 101 F (38.3 C) and over 60 years of age    Negative: Fever > 100.0 F (37.8 C) and has diabetes mellitus or a weak immune system (e.g., HIV positive, cancer chemotherapy, organ transplant, splenectomy, chronic steroids)    Negative: Fever > 100.0 F (37.8 C) and bedridden (e.g., nursing home patient, stroke, chronic illness, recovering from surgery)    Protocols used: SINUS PAIN AND CONGESTION-A-OH    Macy Crabtree RN, BSN, PHN

## 2021-02-17 NOTE — PATIENT INSTRUCTIONS
At [facility], we strive to deliver an exceptional experience to you, every time we see you. If you receive a survey, please complete it as we do value your feedback.  If you have MyChart, you can expect to receive results automatically within 24 hours of their completion.  Your provider will send a note interpreting your results as well.   If you do not have MyChart, you should receive your results in about a week by mail.    Your care team:                            Family Medicine Internal Medicine   MD Colton Brito MD Shantel Branch-Fleming, MD Srinivasa Vaka, MD Katya Belousova, PAPASTOR Howard, APRN CNP    Rome Leigh, MD Pediatrics   Eagle [place], PAPASTOR Joaquin, CNP MD Marcy Delarosa APRN CNP   MD Yesenia Navarrete MD Deborah Mielke, MD Aretha Taylor, APRN [facility]      Clinic hours: Monday - Thursday 7 am-6 pm; Fridays 7 am-5 pm.   Urgent care: Monday - Friday 11 am-9 pm; Saturday and Sunday 9 am-5 pm.    Clinic: [phone]       [facility]: Monday - Thursday 8 am - 7 pm; Friday 8 am - 6 pm  [facility]: [phone]     Use www.[email or web address] for 24/7 diagnosis and treatment of dozens of conditions.

## 2021-02-26 ENCOUNTER — TRANSFERRED RECORDS (OUTPATIENT)
Dept: HEALTH INFORMATION MANAGEMENT | Facility: CLINIC | Age: 61
End: 2021-02-26

## 2021-02-26 DIAGNOSIS — J01.90 ACUTE SINUSITIS WITH SYMPTOMS > 10 DAYS: ICD-10-CM

## 2021-02-26 DIAGNOSIS — J32.9 RECURRENT SINUSITIS: ICD-10-CM

## 2021-02-26 RX ORDER — MOMETASONE FUROATE MONOHYDRATE 50 UG/1
2 SPRAY, METERED NASAL DAILY
Qty: 17 G | Refills: 0 | Status: SHIPPED | OUTPATIENT
Start: 2021-02-26 | End: 2021-04-07

## 2021-02-26 NOTE — TELEPHONE ENCOUNTER
Prescription approved per [facility] Refill Protocol.  Katelin Gould RN

## 2021-04-05 DIAGNOSIS — J32.9 RECURRENT SINUSITIS: ICD-10-CM

## 2021-04-05 DIAGNOSIS — J01.90 ACUTE SINUSITIS WITH SYMPTOMS > 10 DAYS: ICD-10-CM

## 2021-04-05 DIAGNOSIS — F51.04 PSYCHOPHYSIOLOGICAL INSOMNIA: ICD-10-CM

## 2021-04-07 RX ORDER — MOMETASONE FUROATE MONOHYDRATE 50 UG/1
SPRAY, METERED NASAL
Qty: 17 G | Refills: 5 | Status: SHIPPED | OUTPATIENT
Start: 2021-04-07 | End: 2021-11-10

## 2021-04-07 RX ORDER — ZOLPIDEM TARTRATE 5 MG/1
2.5-5 TABLET ORAL
Qty: 30 TABLET | Refills: 0 | Status: SHIPPED | OUTPATIENT
Start: 2021-04-07 | End: 2021-05-05

## 2021-04-07 NOTE — TELEPHONE ENCOUNTER
Routing refill request to provider for review/approval because:  Drug not on the [facility] refill protocol   Barbara Cesar BSN, RN

## 2021-04-28 DIAGNOSIS — F51.04 PSYCHOPHYSIOLOGICAL INSOMNIA: ICD-10-CM

## 2021-04-28 NOTE — TELEPHONE ENCOUNTER
Routing refill request to provider for review/approval because:  Drug not on the [facility] refill protocol     Mirna MITCHELLN, RN

## 2021-05-04 NOTE — TELEPHONE ENCOUNTER
Pharmacy checking on status of refill.      Refill request resent to provider high priority.  Please address refill.  Katelin Gould RN

## 2021-05-04 NOTE — TELEPHONE ENCOUNTER
Patient is calling checking on the status of the refill. She said that she had a death in the family and needs this to sleep. Please advise.Maricruz Trinidad

## 2021-05-05 RX ORDER — ZOLPIDEM TARTRATE 5 MG/1
TABLET ORAL
Qty: 30 TABLET | Refills: 0 | Status: SHIPPED | OUTPATIENT
Start: 2021-05-05 | End: 2021-06-02

## 2021-05-05 NOTE — TELEPHONE ENCOUNTER
Let patient know she needs to be seen (office visit) for further refills after this.           (Previous appointment history: 1/19/2021 virtual visit for recurrent sinusitis, 10/8/2020 virtual visit for back pain, 3/8/2019 office visit for recurrent sinusitis)

## 2021-05-12 ENCOUNTER — VIRTUAL VISIT (OUTPATIENT)
Dept: FAMILY MEDICINE | Facility: CLINIC | Age: 61
End: 2021-05-12
Payer: COMMERCIAL

## 2021-05-12 DIAGNOSIS — J30.9 ALLERGIC SINUSITIS: Primary | ICD-10-CM

## 2021-05-12 DIAGNOSIS — B34.9 VIRAL ILLNESS: ICD-10-CM

## 2021-05-12 PROCEDURE — 99213 OFFICE O/P EST LOW 20 MIN: CPT | Mod: 95 | Performed by: FAMILY MEDICINE

## 2021-05-12 RX ORDER — IPRATROPIUM BROMIDE 42 UG/1
2 SPRAY, METERED NASAL 4 TIMES DAILY
Qty: 15 ML | Refills: 0 | Status: SHIPPED | OUTPATIENT
Start: 2021-05-12 | End: 2022-06-22

## 2021-05-12 NOTE — PROGRESS NOTES
Paco is a 61 year old who is being evaluated via a billable telephone visit.      What phone number would you like to be contacted at? See appt  How would you like to obtain your AVS? Mail a copy    Assessment & Plan     Allergic sinusitis  Discussed possible etiologies given travel from [place] and itchy eyes and ears.  Patient refused steroids and wanted antibiotics.  Rx for atrovent sent instead. Follow up in 3 days if not improved and antibiotics may be consider at that time.  - ipratropium (ATROVENT) 0.06 % nasal spray; Spray 2 sprays into both nostrils 4 times daily    Viral illness  As above  - ipratropium (ATROVENT) 0.06 % nasal spray; Spray 2 sprays into both nostrils 4 times daily    The uses and side effects, including black box warnings as appropriate, were discussed in detail.  All patient questions were answered.  The patient was instructed to call immediately if any side effects developed.     Return in about 3 days (around 5/15/2021), or if symptoms worsen or fail to improve.    Rosalia Alejandre MD  [facility]   Paco is a 61 year old who presents for the following health issues     HPI     Cough - productive, sinus pressure, runny nose, nasal congestion, eye and ear itching for about 1 week.    Does have seasonal allergies and taking claritin and claritin D.  Using nasonex daily as well.    Came back from [place] 2 weeks ago.      Review of Systems   Constitutional, HEENT, cardiovascular, pulmonary, gi and gu systems are negative, except as otherwise noted.      Objective           Vitals:  No vitals were obtained today due to virtual visit.    Physical Exam   healthy, alert and no distress  PSYCH: Alert and oriented times 3; coherent speech, normal   rate and volume, able to articulate logical thoughts, able   to abstract reason, no tangential thoughts, no hallucinations   or delusions  Her affect is normal  RESP: No cough, no audible  wheezing, able to talk in full sentences  Remainder of exam unable to be completed due to telephone visits          Phone call duration: 14 minutes

## 2021-05-29 DIAGNOSIS — F51.04 PSYCHOPHYSIOLOGICAL INSOMNIA: ICD-10-CM

## 2021-06-01 NOTE — TELEPHONE ENCOUNTER
Patient is scheduled for an appointment on 6/15/21 with Dr. Desai, please fill patient med as she took the last pill and cannot sleep without this medication.  Noe Leonard (AKA:  Lian), , [facility], Primary Care

## 2021-06-01 NOTE — TELEPHONE ENCOUNTER
Routing refill request to provider for review/approval because:  Drug not on the [facility] refill protocol     Dede MITCHELLN, RN

## 2021-06-02 RX ORDER — ZOLPIDEM TARTRATE 5 MG/1
TABLET ORAL
Qty: 30 TABLET | Refills: 5 | Status: SHIPPED | OUTPATIENT
Start: 2021-06-02 | End: 2021-11-25

## 2021-06-15 ENCOUNTER — OFFICE VISIT (OUTPATIENT)
Dept: FAMILY MEDICINE | Facility: CLINIC | Age: 61
End: 2021-06-15
Payer: COMMERCIAL

## 2021-06-15 VITALS
SYSTOLIC BLOOD PRESSURE: 119 MMHG | DIASTOLIC BLOOD PRESSURE: 77 MMHG | HEART RATE: 102 BPM | BODY MASS INDEX: 23.19 KG/M2 | WEIGHT: 126 LBS | OXYGEN SATURATION: 98 % | HEIGHT: 62 IN

## 2021-06-15 DIAGNOSIS — Z12.4 SCREENING FOR MALIGNANT NEOPLASM OF CERVIX: ICD-10-CM

## 2021-06-15 DIAGNOSIS — Z23 NEED FOR VACCINATION: ICD-10-CM

## 2021-06-15 DIAGNOSIS — F43.23 ADJUSTMENT DISORDER WITH MIXED ANXIETY AND DEPRESSED MOOD: ICD-10-CM

## 2021-06-15 DIAGNOSIS — F51.04 PSYCHOPHYSIOLOGICAL INSOMNIA: Primary | ICD-10-CM

## 2021-06-15 DIAGNOSIS — Z11.4 SCREENING FOR HIV (HUMAN IMMUNODEFICIENCY VIRUS): ICD-10-CM

## 2021-06-15 DIAGNOSIS — Z13.220 SCREENING FOR HYPERLIPIDEMIA: ICD-10-CM

## 2021-06-15 PROCEDURE — 99214 OFFICE O/P EST MOD 30 MIN: CPT | Performed by: FAMILY MEDICINE

## 2021-06-15 RX ORDER — ZOLPIDEM TARTRATE 6.25 MG/1
6.25 TABLET, FILM COATED, EXTENDED RELEASE ORAL
Qty: 30 TABLET | Refills: 0 | Status: SHIPPED | OUTPATIENT
Start: 2021-06-15 | End: 2022-05-31

## 2021-06-15 ASSESSMENT — MIFFLIN-ST. JEOR: SCORE: 1085.81

## 2021-06-15 ASSESSMENT — PAIN SCALES - GENERAL: PAINLEVEL: NO PAIN (0)

## 2021-06-15 NOTE — PATIENT INSTRUCTIONS
At [facility], we strive to deliver an exceptional experience to you, every time we see you. If you receive a survey, please complete it as we do value your feedback.  If you have MyChart, you can expect to receive results automatically within 24 hours of their completion.  Your provider will send a note interpreting your results as well.   If you do not have MyChart, you should receive your results in about a week by mail.    Your care team:                            Family Medicine Internal Medicine   MD Colton Brito MD Shantel Branch-Fleming, MD Srinivasa Vaka, MD Katya Belousova, PAPASTOR Howard, APRN CNP    Rome Leigh, MD Pediatrics   Eagle [place], PAPASTOR Joaquin, CNP MD Marcy Delarosa APRN CNP   MD Yesenia Navarrete MD Deborah Mielke, MD Aretha Taylor, APRN [facility]      Clinic hours: Monday - Thursday 7 am-6 pm; Fridays 7 am-5 pm.   Urgent care: Monday - Friday 10 am- 8 pm; Saturday and Sunday 9 am-5 pm.    Clinic: [phone]       [facility]: Monday - Thursday 8 am - 7 pm; Friday 8 am - 6 pm  [facility]: [phone]     Use [email or web address] for 24/7 diagnosis and treatment of dozens of conditions.

## 2021-06-15 NOTE — PROGRESS NOTES
"    Assessment & Plan     (F51.04) Psychophysiological insomnia  (primary encounter diagnosis)  Comment: Patient denies any problems with the medication, which she has been taking for several years  Plan: zolpidem ER (AMBIEN CR) 6.25 MG CR tablet         Return in about 6 months (around 12/15/2021) for recheck medications.      (Z11.4) Screening for HIV (human immunodeficiency virus)  Comment: indications for screening discussed with the patient   Plan: Screening declined    (Z13.220) Screening for hyperlipidemia  Comment: indications for screening discussed with the patient   Plan: Screening declined    (Z12.4) Screening for malignant neoplasm of cervix  Comment: I advised the patient that she is due for this as well  Plan:     (F43.23) Adjustment disorder with mixed anxiety and depressed mood  Comment: Acknowledged to the patient that I am aware of her emergency department visit February 2021 for \"depression\".  She is having difficulty at that time, and has on going difficulty sleeping because she is worried that her granddaughter is going to kill herself.  Her granddaughter is a \"cutter\", has been suicidal, and has problems with drug abuse.  He is factor into the patient's PHQ-9 and WILVER-7 responses, but she is stubbornly declining any help at this, including medications.  I am hopeful she will consider counseling  Plan: MENTAL HEALTH REFERRAL  - Adult; Outpatient         Treatment; Individual/Couples/Family/Group         Therapy/Health Psychology; [facility]: [facility] [phone]; We will         contact you to schedule the appointment or         please call with any questions            (Z23) Need for vaccination  Comment:   Plan: Encourage the patient to get the COVID-19 vaccine.  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    Rodney Desai MD  [facility] " "[facility] ULISES Antonio is a 61 year old who presents for the following health issues     HPI     This 61-year-old female complains of needing an update of her medication, namely her zolpidem.    Review of Systems   CONSTITUTIONAL: NEGATIVE for fever, chills, change in weight  ENT/MOUTH: NEGATIVE for ear, mouth and throat problems  RESP: NEGATIVE for significant cough or SOB  CV: NEGATIVE for chest pain, palpitations or peripheral edema      Objective    /77 (BP Location: Left arm, Patient Position: Chair, Cuff Size: Adult Regular)   Pulse 102   Ht 1.568 m (5' 1.75\")   Wt 57.2 kg (126 lb)   LMP  (LMP Unknown)   SpO2 98%   BMI 23.23 kg/m    Body mass index is 23.23 kg/m .  Physical Exam   GENERAL: healthy, alert and no distress  EYES: Eyes grossly normal to inspection, PERRL, EOMI, sclerae white and conjunctivae normal  MS: no gross musculoskeletal defects noted, no edema  SKIN: no suspicious lesions or rashes to visible skin  NEURO: Normal strength and tone, sensory exam grossly normal, mentation intact, oriented times 3 and cranial nerves 2-12 intact  PSYCH: mentation appears normal, she does become tearful as we discussed her psychosocial stressors            "

## 2021-06-27 ENCOUNTER — OFFICE VISIT (OUTPATIENT)
Dept: URGENT CARE | Facility: URGENT CARE | Age: 61
End: 2021-06-27
Payer: COMMERCIAL

## 2021-06-27 VITALS
RESPIRATION RATE: 18 BRPM | TEMPERATURE: 97.9 F | SYSTOLIC BLOOD PRESSURE: 143 MMHG | OXYGEN SATURATION: 99 % | HEART RATE: 90 BPM | DIASTOLIC BLOOD PRESSURE: 86 MMHG

## 2021-06-27 DIAGNOSIS — Z20.822 SUSPECTED COVID-19 VIRUS INFECTION: Primary | ICD-10-CM

## 2021-06-27 PROCEDURE — 36415 COLL VENOUS BLD VENIPUNCTURE: CPT | Performed by: PHYSICIAN ASSISTANT

## 2021-06-27 PROCEDURE — 86769 SARS-COV-2 COVID-19 ANTIBODY: CPT | Mod: 59 | Performed by: PHYSICIAN ASSISTANT

## 2021-06-27 PROCEDURE — 86769 SARS-COV-2 COVID-19 ANTIBODY: CPT | Mod: 90 | Performed by: PHYSICIAN ASSISTANT

## 2021-06-27 PROCEDURE — 99213 OFFICE O/P EST LOW 20 MIN: CPT | Performed by: PHYSICIAN ASSISTANT

## 2021-06-27 PROCEDURE — 99000 SPECIMEN HANDLING OFFICE-LAB: CPT | Performed by: PHYSICIAN ASSISTANT

## 2021-06-27 ASSESSMENT — ENCOUNTER SYMPTOMS
HEADACHES: 0
NECK PAIN: 0
ENDOCRINE NEGATIVE: 1
MUSCULOSKELETAL NEGATIVE: 1
ALLERGIC/IMMUNOLOGIC NEGATIVE: 1
JOINT SWELLING: 0
NAUSEA: 0
DIARRHEA: 0
MYALGIAS: 0
LIGHT-HEADEDNESS: 0
RHINORRHEA: 0
CARDIOVASCULAR NEGATIVE: 1
WOUND: 0
WEAKNESS: 0
NECK STIFFNESS: 0
ARTHRALGIAS: 0
VOMITING: 0
EYES NEGATIVE: 1
PALPITATIONS: 0
SORE THROAT: 0
BACK PAIN: 0
DIZZINESS: 0
COUGH: 0
FEVER: 0
RESPIRATORY NEGATIVE: 1
CHILLS: 0
SHORTNESS OF BREATH: 0

## 2021-06-27 NOTE — PROGRESS NOTES
Chief Complaint:     Chief Complaint   Patient presents with     Covid 19 Testing       No results found for any visits on 06/27/21.    Medical Decision Making:    Vital signs reviewed by Tom Siddiqui PA-C  BP (!) 143/86   Pulse 90   Temp 97.9  F (36.6  C)   Resp 18   LMP  (LMP Unknown)   SpO2 99%     Differential Diagnosis:  Previous COVID exposure.        ASSESSMENT    1. Suspected COVID-19 virus infection        PLAN    Patient is in no acute distress.    Temp is 97.9 in clinic today, lung sounds were clear, and O2 sats at 99% on RA.    COVID titers ordered and  collected in clinic today.  Patient verbalized understanding and agreed with this plan.    Labs:    No results found for any visits on 06/27/21.     Vital signs reviewed by Tom Siddiqui PA-C  BP (!) 143/86   Pulse 90   Temp 97.9  F (36.6  C)   Resp 18   LMP  (LMP Unknown)   SpO2 99%     Current Meds      Current Outpatient Medications:      ipratropium (ATROVENT) 0.06 % nasal spray, Spray 2 sprays into both nostrils 4 times daily, Disp: 15 mL, Rfl: 0     loratadine (CLARITIN) 10 MG tablet, Take 10 mg by mouth daily, Disp: , Rfl:      meloxicam (MOBIC) 7.5 MG tablet, Take 1 tablet (7.5 mg) by mouth daily with food as needed for back or joint pain., Disp: 30 tablet, Rfl: 1     mometasone (NASONEX) 50 MCG/ACT nasal spray, SHAKE LIQUID AND USE 2 SPRAYS IN EACH NOSTRIL DAILY, Disp: 17 g, Rfl: 5     tiZANidine (ZANAFLEX) 4 MG tablet, Take 1 tablet (4 mg) by mouth 3 times daily as needed for muscle spasms, Disp: 40 tablet, Rfl: 0     zolpidem (AMBIEN) 5 MG tablet, TAKE 1/2 TO 1 TABLET(2.5 TO 5 MG) BY MOUTH EVERY NIGHT AT BEDTIME AS NEEDED FOR SLEEP, Disp: 30 tablet, Rfl: 5     zolpidem ER (AMBIEN CR) 6.25 MG CR tablet, Take 1 tablet (6.25 mg) by mouth nightly as needed for sleep, Disp: 30 tablet, Rfl: 0      Respiratory History    Occasional bronchitis.      SUBJECTIVE    HPI: Paco Medrano is an 61 year old female who presents for COVID  testing.  She is currently asymptomatic and would like testing for work.    Patient denies any recent travel or exposure to known COVID positive tested individual.      ROS:     Review of Systems   Constitutional: Negative for chills and fever.   HENT: Negative for congestion, ear pain, rhinorrhea and sore throat.    Eyes: Negative.    Respiratory: Negative.  Negative for cough and shortness of breath.    Cardiovascular: Negative.  Negative for chest pain and palpitations.   Gastrointestinal: Negative for diarrhea, nausea and vomiting.   Endocrine: Negative.    Genitourinary: Negative.    Musculoskeletal: Negative.  Negative for arthralgias, back pain, joint swelling, myalgias, neck pain and neck stiffness.   Skin: Negative.  Negative for rash and wound.   Allergic/Immunologic: Negative.  Negative for immunocompromised state.   Neurological: Negative for dizziness, weakness, light-headedness and headaches.         Family History   Family History   Problem Relation Age of Onset     Glaucoma Mother      Hypertension Mother      Hypertension Father      Cancer Sister      Diabetes Daughter         Problem history  Patient Active Problem List   Diagnosis     CARDIOVASCULAR SCREENING; LDL GOAL LESS THAN 160     Advance care planning     Psychophysiological insomnia     Tobacco use disorder     Major depressive disorder, recurrent episode, mild (H)     Anxiety        Allergies  Allergies   Allergen Reactions     Azithromycin Nausea     Erythromycin      gi upset     Septra [Sulfamethoxazole W-Trimethoprim] Rash        Social History  Social History     Socioeconomic History     Marital status:      Spouse name:  (June 2013, Jignesh)     Number of children: 4     Years of education: Not on file     Highest education level: Not on file   Occupational History     Not on file   Social Needs     Financial resource strain: Not on file     Food insecurity     Worry: Not on file     Inability: Not on file      Transportation needs     Medical: Not on file     Non-medical: Not on file   Tobacco Use     Smoking status: Current Every Day Smoker     Start date: 1/9/2012     Smokeless tobacco: Never Used   Substance and Sexual Activity     Alcohol use: Yes     Alcohol/week: 0.0 standard drinks     Drug use: No     Sexual activity: Not Currently   Lifestyle     Physical activity     Days per week: Not on file     Minutes per session: Not on file     Stress: Not on file   Relationships     Social connections     Talks on phone: Not on file     Gets together: Not on file     Attends [employer] service: Not on file     Active member of club or organization: Not on file     Attends meetings of clubs or organizations: Not on file     Relationship status: Not on file     Intimate partner violence     Fear of current or ex partner: Not on file     Emotionally abused: Not on file     Physically abused: Not on file     Forced sexual activity: Not on file   Other Topics Concern     Parent/sibling w/ CABG, MI or angioplasty before 65F 55M? Not Asked   Social History Narrative     Not on file        OBJECTIVE     Vital signs reviewed by Tom Siddiqui PA-C  BP (!) 143/86   Pulse 90   Temp 97.9  F (36.6  C)   Resp 18   LMP  (LMP Unknown)   SpO2 99%      Physical Exam  Vitals signs and nursing note reviewed.   Constitutional:       General: She is not in acute distress.     Appearance: She is well-developed. She is not ill-appearing, toxic-appearing or diaphoretic.   HENT:      Head: Normocephalic and atraumatic.      Right Ear: Tympanic membrane and external ear normal. No drainage, swelling or tenderness. Tympanic membrane is not perforated, erythematous, retracted or bulging.      Left Ear: Tympanic membrane and external ear normal. No drainage, swelling or tenderness. Tympanic membrane is not perforated, erythematous, retracted or bulging.      Nose: No mucosal edema, congestion or rhinorrhea.      Right Sinus: No maxillary sinus  tenderness or frontal sinus tenderness.      Left Sinus: No maxillary sinus tenderness or frontal sinus tenderness.      Mouth/Throat:      Pharynx: No pharyngeal swelling, oropharyngeal exudate, posterior oropharyngeal erythema or uvula swelling.      Tonsils: No tonsillar abscesses.   Eyes:      Pupils: Pupils are equal, round, and reactive to light.   Neck:      Musculoskeletal: Full passive range of motion without pain, normal range of motion and neck supple.      Trachea: Trachea normal.   Cardiovascular:      Rate and Rhythm: Normal rate and regular rhythm.      Heart sounds: Normal heart sounds, S1 normal and S2 normal. No murmur. No friction rub. No gallop.    Pulmonary:      Effort: Pulmonary effort is normal. No respiratory distress.      Breath sounds: Normal breath sounds. No decreased breath sounds, wheezing, rhonchi or rales.   Abdominal:      General: Bowel sounds are normal. There is no distension.      Palpations: Abdomen is soft. Abdomen is not rigid. There is no mass.      Tenderness: There is no abdominal tenderness. There is no guarding or rebound.   Lymphadenopathy:      Cervical: No cervical adenopathy.   Skin:     General: Skin is warm and dry.   Neurological:      Mental Status: She is alert and oriented to person, place, and time.      Cranial Nerves: No cranial nerve deficit.      Deep Tendon Reflexes: Reflexes are normal and symmetric.   Psychiatric:         Behavior: Behavior normal. Behavior is cooperative.         Thought Content: Thought content normal.         Judgment: Judgment normal.           Tom Siddiqui PA-C  6/27/2021, 4:26 PM

## 2021-06-27 NOTE — LETTER
June 30, 2021      Malouabi PEREZ Mitchell  [address] [place] [address]        Dear ,    We are writing to inform you of your test results.        Resulted Orders   COVID-19 Dejon RBD Jennifer & Titer Reflex   Result Value Ref Range    COVID-19 Dejon RBD Jennifer Negative       Comment:      No COVID-19 antibodies detected. Patients with recent COVID-19 vaccination or   recent symptom onset from COVID-19 infection may not produce sufficient levels   of detectable antibodies. Immunocompromised COVID-19 patients may take longer   to develop antibodies.      COVID-19 Dejon RBD Jennifer Titer Not Applicable       Comment:      Qualitative screen for IgG, IgM and IgA antibodies to COVID-19 (SARS-CoV-2)   spike receptor binding domain (RBD) protein. COVID-19 spike RBD antibodies may   be elevated due to vaccination, or a past or current COVID-19 infection.  The qualitative screen for IgG, IgM and IgA COVID-19 spike RBD antibodies is   performed on the [facility], and this test is approved by the FDA under the   Emergency Use Authorization (EUA).  Negative results do not rule out COVID-19 infection. Results from antibody   testing should not be used as the sole basis to diagnose or exclude SARS-CoV-2   infection or to inform infection status. COVID-19 PCR test should be ordered   if current infection is suspected. False positive results may occur in rare   cases due to cross-reacting antibodies.  Testing performed by [facility], [facility], [address], [address], [place] [address]         If you have any questions or concerns, please call the clinic at the number listed above.       Sincerely,      Tom Siddiqui PA-C

## 2021-06-28 ENCOUNTER — TELEPHONE (OUTPATIENT)
Dept: FAMILY MEDICINE | Facility: CLINIC | Age: 61
End: 2021-06-28

## 2021-06-28 NOTE — TELEPHONE ENCOUNTER
Reason for Call:  Request for results:    Name of test or procedure: Covid Antibody    Date of test of procedure: 6/27/21    Location of the test or procedure: [facility]    OK to leave the result message on voice mail or with a family member? YES    Phone number Patient can be reached at:  Home number on file [phone] (home)    Additional comments: Please call when results are in     Call taken on 6/28/2021 at 5:45 PM by Aziza Walls

## 2021-06-29 LAB
SARS-COV-2 AB PNL SERPL IA: NEGATIVE
SARS-COV-2 AB SERPL QL IA: NEGATIVE
SARS-COV-2 IGG SERPL IA-ACNC: NORMAL

## 2021-11-08 DIAGNOSIS — J32.9 RECURRENT SINUSITIS: ICD-10-CM

## 2021-11-08 DIAGNOSIS — J01.90 ACUTE SINUSITIS WITH SYMPTOMS > 10 DAYS: ICD-10-CM

## 2021-11-10 RX ORDER — MOMETASONE FUROATE MONOHYDRATE 50 UG/1
SPRAY, METERED NASAL
Qty: 17 G | Refills: 5 | Status: SHIPPED | OUTPATIENT
Start: 2021-11-10 | End: 2022-06-24

## 2021-11-23 DIAGNOSIS — F51.04 PSYCHOPHYSIOLOGICAL INSOMNIA: ICD-10-CM

## 2021-11-23 NOTE — TELEPHONE ENCOUNTER
Routing refill request to provider for review/approval because:  Drug not on the [facility] refill protocol     Diana Conroy RN  [facility]

## 2021-11-25 RX ORDER — ZOLPIDEM TARTRATE 5 MG/1
TABLET ORAL
Qty: 30 TABLET | Refills: 5 | Status: SHIPPED | OUTPATIENT
Start: 2021-11-25 | End: 2022-05-23

## 2021-11-29 ENCOUNTER — TELEPHONE (OUTPATIENT)
Dept: FAMILY MEDICINE | Facility: CLINIC | Age: 61
End: 2021-11-29
Payer: COMMERCIAL

## 2021-11-29 NOTE — TELEPHONE ENCOUNTER
Plan does not cover zolpidem (AMBIEN) 5 MG tablet.  Please call [phone] to initiate Prior Auth or change med.    Insurance type and ID number: ID# [id number]      Additional Information:     Isabel Higgins

## 2022-01-06 ENCOUNTER — TELEPHONE (OUTPATIENT)
Dept: FAMILY MEDICINE | Facility: CLINIC | Age: 62
End: 2022-01-06
Payer: COMMERCIAL

## 2022-01-06 NOTE — TELEPHONE ENCOUNTER
Pharmacy is fazing PA request for this?    Is doesn't t look like anyone has looked at this since 11/29/21?    Please address ASAP?

## 2022-01-30 ENCOUNTER — NURSE TRIAGE (OUTPATIENT)
Dept: NURSING | Facility: CLINIC | Age: 62
End: 2022-01-30
Payer: COMMERCIAL

## 2022-01-31 ENCOUNTER — OFFICE VISIT (OUTPATIENT)
Dept: URGENT CARE | Facility: URGENT CARE | Age: 62
End: 2022-01-31
Payer: COMMERCIAL

## 2022-01-31 VITALS
TEMPERATURE: 97.7 F | OXYGEN SATURATION: 98 % | WEIGHT: 122.2 LBS | DIASTOLIC BLOOD PRESSURE: 82 MMHG | RESPIRATION RATE: 16 BRPM | HEART RATE: 83 BPM | BODY MASS INDEX: 22.53 KG/M2 | SYSTOLIC BLOOD PRESSURE: 144 MMHG
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
## 2022-01-31 DIAGNOSIS — M53.3 PAIN IN THE COCCYX: Primary | ICD-10-CM

## 2022-01-31 DIAGNOSIS — M62.830 BACK MUSCLE SPASM: ICD-10-CM

## 2022-01-31 PROCEDURE — 96372 THER/PROPH/DIAG INJ SC/IM: CPT | Performed by: PHYSICIAN ASSISTANT

## 2022-01-31 PROCEDURE — 99213 OFFICE O/P EST LOW 20 MIN: CPT | Mod: 25 | Performed by: PHYSICIAN ASSISTANT

## 2022-01-31 RX ORDER — CYCLOBENZAPRINE HCL 10 MG
10 TABLET ORAL 3 TIMES DAILY PRN
Qty: 30 TABLET | Refills: 0 | Status: SHIPPED | OUTPATIENT
Start: 2022-01-31 | End: 2022-06-22

## 2022-01-31 RX ORDER — KETOROLAC TROMETHAMINE 30 MG/ML
30 INJECTION, SOLUTION INTRAMUSCULAR; INTRAVENOUS ONCE
Status: COMPLETED | OUTPATIENT
Start: 2022-01-31 | End: 2022-01-31

## 2022-01-31 RX ADMIN — KETOROLAC TROMETHAMINE 30 MG: 30 INJECTION, SOLUTION INTRAMUSCULAR; INTRAVENOUS at 17:53

## 2022-01-31 ASSESSMENT — ENCOUNTER SYMPTOMS
EYES NEGATIVE: 1
COUGH: 0
WOUND: 0
CHILLS: 0
ENDOCRINE NEGATIVE: 1
PALPITATIONS: 0
CARDIOVASCULAR NEGATIVE: 1
MYALGIAS: 0
DIZZINESS: 0
NECK PAIN: 0
RESPIRATORY NEGATIVE: 1
BACK PAIN: 1
SORE THROAT: 0
NAUSEA: 0
SHORTNESS OF BREATH: 0
VOMITING: 0
WEAKNESS: 0
RHINORRHEA: 0
HEADACHES: 0
NECK STIFFNESS: 0
FEVER: 0
LIGHT-HEADEDNESS: 0
ALLERGIC/IMMUNOLOGIC NEGATIVE: 1
JOINT SWELLING: 0
ARTHRALGIAS: 0
DIARRHEA: 0

## 2022-01-31 NOTE — TELEPHONE ENCOUNTER
Paco calls and says that she fell down her inside carpeted stairs last night and hurt her back. Pt. Says that she fell down 11 stairs. Pt. Says that she hurt her tailbone and lower back. Pt. Says that the pain is moving into her middle back, too. Pt. Says that her left buttocks is bruised. Pt. Says that she wants to see her Dr. At her clinic tomorrow. Pt. Was then conferenced with [facility] , for assistance in scheduling an appointment for pt. To see her Dr. Tomorrow. COVID 19 Nurse Triage Plan/Patient Instructions    Please be aware that novel coronavirus (COVID-19) may be circulating in the community. If you develop symptoms such as fever, cough, or SOB or if you have concerns about the presence of another infection including coronavirus (COVID-19), please contact your health care provider or visit [email or web address].     Disposition/Instructions    In-Person Visit with provider recommended. Reference Visit Selection Guide.    Thank you for taking steps to prevent the spread of this virus.  o Limit your contact with others.  o Wear a simple mask to cover your cough.  o Wash your hands well and often.    Resources    [facility]: About COVID-19: [email or web address]    CDC: What to Do If You're Sick: www.cdc.gov/coronavirus/2019-ncov/about/steps-when-sick.html    CDC: Ending Home Isolation: www.cdc.gov/coronavirus/2019-ncov/hcp/disposition-in-home-patients.html     CDC: Caring for Someone: www.cdc.gov/coronavirus/2019-ncov/if-you-are-sick/care-for-someone.html     [facility]: Interim Guidance for Hospital Discharge to Home: www.health.[facility].mn.us/diseases/coronavirus/hcp/hospdischarge.pdf    [facility] clinical trials (COVID-19 research studies): clinicalaffairs.[facility]/umn-clinical-trials     Below are the COVID-19 hotlines at the [place] [facility] ([facility]). Interpreters are available.   o For health questions: Call 735-399-5438 or 1-138.413.4562 (7 a.m. to 7  p.m.)  o For questions about schools and childcare: Call 787-934-5419 or 1-704.715.4845 (7 a.m. to 7 p.m.)                   Reason for Disposition    Injury to back above tailbone    [1] High-risk adult (e.g., age > 60, osteoporosis, chronic steroid use) AND [2] still hurts    Additional Information    Negative: Dangerous mechanism of injury (e.g., MVA, contact sports, trampoline, diving, fall > 10 feet or 3 meters)  (Exception: back pain began > 1 hour after injury)    Negative: [1] Weakness (i.e., paralysis, loss of muscle strength) of the leg(s) or foot AND [2] sudden onset after back injury    Negative: [1] Numbness (i.e., loss of sensation) of the leg(s) or foot AND [2] sudden onset after back injury    Negative: [1] Major bleeding (e.g., actively dripping or spurting) AND [2] can't be stopped    Negative: Bullet wound, knife wound, or other penetrating object    Negative: Shock suspected (e.g., cold/pale/clammy skin, too weak to stand, low BP, rapid pulse)    Negative: Sounds like a life-threatening emergency to the triager    Negative: [1] Injuries at more than 1 site AND [2] unsure which guideline to use    Negative: Injury to the neck    Negative: Dangerous mechanism of injury (e.g., fall > 10 feet or 3 meters, trampoline)(Exception: pain began > 1 hour after injury)    Negative: Sounds like a life-threatening emergency to the triager    Negative: Injury to the tailbone    Negative: Back pain not from an injury    Negative: Back pain from overuse (work, exercise, gardening) OR from twisting, lifting, or bending injury    Negative: [1] SEVERE PAIN in kidney area (flank) AND [2] follows direct blow to that site    Negative: Blood in urine (red, pink, or tea-colored)    Negative: [1] Unable to urinate (or only a few drops) > 4 hours AND [2] bladder feels very full (e.g., palpable bladder or strong urge to urinate)    Negative: [1] Loss of bladder or bowel control (urine or bowel incontinence; wetting self,  leaking stool) AND [2] new onset    Negative: Numbness (loss of sensation) in groin or rectal area    Negative: Skin is split open or gaping  (or length > 1/2 inch or 12 mm)    Negative: Puncture wound of back    Negative: [1] Bleeding AND [2] won't stop after 10 minutes of direct pressure (using correct technique)    Negative: Sounds like a serious injury to the triager    Negative: Weakness of a leg or foot (e.g., unable to bear weight, dragging foot)    Negative: Numbness of a leg or foot (i.e.., loss of sensation)    Negative: [1] SEVERE pain (e.g., excruciating) AND [2] not improved 2 hours after pain medicine/ice packs    Negative: Pain radiates into the thigh or further down the leg now    Negative: [1] Landed hard on feet or buttocks AND [2] pain over spine    Negative: Suspicious history for the injury    Negative: Patient is confused or is an unreliable provider of information (e.g., dementia, severe intellectual disability, alcohol intoxication)    Protocols used: TAILBONE INJURY-A-AH, BACK INJURY-A-AH

## 2022-01-31 NOTE — PATIENT INSTRUCTIONS
Patient Education     Muscle Spasm  A muscle spasm is a sudden tightening of the muscle you can t control. This may be caused by strain, overworking the muscle, or injury. It can also be caused by dehydration, electrolyte imbalance, diabetes, alcohol use, and certain medicines. If it goes on long enough the muscle spasm causes pain. Common areas for muscle spasm are the legs, neck, and back.  Home care    Heat, massage, and stretching will help relax muscle spasm.    When the spasm is in your arm or leg, stretch the muscle passively. To do this, have someone bend or straighten the joint above or below the muscle until you feel the stretch on the sore muscle. You can stretch the muscle actively by moving the affected body part. This will stretch the muscle that is in spasm. For example, if the spasm is in your calf, bend the ankle so your toes point upward toward your knee. This will stretch your calf muscle.    You may use over-the-counter pain medicine to control pain, unless another medicine was prescribed. If you have chronic liver or kidney disease or ever had a stomach ulcer or gastrointestinal bleeding, talk with your healthcare provider before using these medicines.    Follow-up care  Follow up with your healthcare provider, or as advised.    When to seek medical advice  Call your healthcare provider right away if any of the following occur:    Fingers or toes become swollen, cold, blue, numb, or tingly    You develop weakness in the affected arm or leg    Pain increases and is not controlled by the above measures  [employer] last reviewed this educational content on 5/1/2018 2000-2021 [employer]. All rights reserved. This information is not intended as a substitute for professional medical care. Always follow your healthcare professional's instructions.           Patient Education     Coccyx or Sacrum Bruise (Contusion)    You have a bruise (contusion of the tailbone (coccyx) or sacrum.  The sacrum is the triangular bone at the base of the spine that joins the pelvic bones. The coccyx is the last bone of the sacrum that hangs down in a point like a small tail. Symptoms include swelling and some bleeding under the skin. This injury often takes a few weeks to heal. During that time, the bruise will typically change in color from reddish, to purple-blue, to greenish-yellow, then to yellow-brown. A crack (fracture) in the coccyx bone causes the same symptoms as a contusion in this area. X-rays are often not taken because the treatment is the same. If you have fracture of the tailbone as well as a contusion, healing generally takes 4 weeks or longer.   Home care    Try to find a position of comfort. Try lying on your side with your knees bent up towards your chest and a pillow between your knees.    A bruised tailbone causes pain when sitting. You may try using a doughnut pillow. This is a foam pillow with a hole in the center to prevent pressure on the tailbone. You can buy this at a pharmacy or orthopedic supply store.    Ice the injured area to help reduce pain and swelling. Wrap a cold source (ice pack or ice cubes in a plastic bag) in a thin towel. Apply to the bruised area for 20 minutes every 1 to 2 hours the first day. Continue this 3 to 4 times a day until the pain and swelling goes away.    Unless another medicine was prescribed, you can take acetaminophen, ibuprofen, or naproxen to control pain. Talk with your healthcare provider before taking these medicines if you have chronic liver or kidney disease or ever had a stomach ulcer or digestive bleeding.    Follow-up care  Follow up with your healthcare provider, or as advised. Call if you do not get better in 2 weeks.   When to seek medical advice   Call your healthcare provider right away if you have any of the following:    Pain or swelling gets worse    Pain spreads to one or both legs    Weakness or numbness in one or both legs    Loss of  bowel or bladder control    Numbness in the groin area    Signs of infection. These include warmth, drainage, or increased redness.    Frequent bruising for unknown reasons  [employer] last reviewed this educational content on 8/1/2019 2000-2021 [employer]. All rights reserved. This information is not intended as a substitute for professional medical care. Always follow your healthcare professional's instructions.

## 2022-01-31 NOTE — NURSING NOTE
Clinic Administered Medication Documentation      Injectable Medication Documentation    Patient was given Ketorolac Tromethamine (Toradol). Prior to medication administration, verified patients identity using patient s name and date of birth. Please see MAR and medication order for additional information. Patient instructed to remain in clinic for 15 minutes.      Was entire vial of medication used? Yes  Vial/Syringe: Single dose vial  Expiration Date:  09/23  Was this medication supplied by the patient? No     Hitesh Ayala CMA

## 2022-01-31 NOTE — LETTER
[facility] [address] [address]          January 31, 2022    RE:  Paco Medrano                                                                                                                                                       [id number] [facility] [id number]            To whom it may concern:    Paco Medrano is under my professional care for    Pain in the coccyx  Back muscle spasm  Fall down stairs, initial encounter.     She  may return to work with no restrictions Thursday 2/3 2022    Sincerely,        Tom Siddiqui PA-C

## 2022-01-31 NOTE — PROGRESS NOTES
Chief Complaint:    Chief Complaint   Patient presents with     Tailbone Pain     fell down from 11 steps on Saturday in the morning about 2 o'clock while carry a tray of food, pain in left side buttock, tailbone and radiates to back         Medical Decision Making:    Vital signs reviewed by Tom Siddiqui PA-C  BP (!) 144/82 (BP Location: Left arm, Patient Position: Sitting, Cuff Size: Adult Regular)   Pulse 83   Temp 97.7  F (36.5  C) (Tympanic)   Resp 16   Wt 55.4 kg (122 lb 3.2 oz)   LMP  (LMP Unknown)   SpO2 98%   BMI 22.53 kg/m      Differential Diagnosis:  Tailbone fracture, tailbone contusion, low back contusion, low back strain, low back fracture.     ASSESSMENT:     1. Pain in the coccyx    2. Back muscle spasm    3. Fall down stairs, initial encounter           PLAN:  Patient is in no acute distress.  Neuro exam was benign.     No pain with palpation of tailbone or lumbar area.  Imaging not indicated.  Patient given 30 Mg Toradol IM in clinic tonight.  Rx for Flexeril tonight.  Ice the affected area.  Ibuprofen and or Tylenol for discomfort.  Patient given letter excusing her from work for 2 days.   Patient instructed to follow up with PCP in 1 week if symptoms are not improving.  Sooner if symptoms worsen.  Worrisome symptoms discussed with instructions to go to the ED.  Patient verbalized understanding and agreed with this plan.    Labs:     No results found for any visits on 01/31/22.    Current Meds:    Current Outpatient Medications:      cyclobenzaprine (FLEXERIL) 10 MG tablet, Take 1 tablet (10 mg) by mouth 3 times daily as needed for muscle spasms, Disp: 30 tablet, Rfl: 0     Ibuprofen (ADVIL PO), , Disp: , Rfl:      loratadine (CLARITIN) 10 MG tablet, Take 10 mg by mouth daily, Disp: , Rfl:      zolpidem (AMBIEN) 5 MG tablet, TAKE 1/2 TO 1 TABLET(2.5 TO 5 MG) BY MOUTH EVERY NIGHT AT BEDTIME AS NEEDED FOR SLEEP, Disp: 30 tablet, Rfl: 5     zolpidem ER (AMBIEN CR) 6.25 MG CR tablet, Take  1 tablet (6.25 mg) by mouth nightly as needed for sleep, Disp: 30 tablet, Rfl: 0     ipratropium (ATROVENT) 0.06 % nasal spray, Spray 2 sprays into both nostrils 4 times daily (Patient not taking: Reported on 1/31/2022), Disp: 15 mL, Rfl: 0     meloxicam (MOBIC) 7.5 MG tablet, Take 1 tablet (7.5 mg) by mouth daily with food as needed for back or joint pain. (Patient not taking: Reported on 1/31/2022), Disp: 30 tablet, Rfl: 1     mometasone (NASONEX) 50 MCG/ACT nasal spray, SHAKE LIQUID AND USE 2 SPRAYS IN EACH NOSTRIL DAILY (Patient not taking: Reported on 1/31/2022), Disp: 17 g, Rfl: 5     tiZANidine (ZANAFLEX) 4 MG tablet, Take 1 tablet (4 mg) by mouth 3 times daily as needed for muscle spasms (Patient not taking: Reported on 1/31/2022), Disp: 40 tablet, Rfl: 0  No current facility-administered medications for this visit.    Allergies:  Allergies   Allergen Reactions     Azithromycin Nausea     Erythromycin      gi upset     Septra [Sulfamethoxazole W-Trimethoprim] Rash       SUBJECTIVE    HPI: Paco Medrano is an 62 year old female who presents for evaluation and treatment of tailbone pain.  Patient fell down her carpeted stairs yesterday.  She is complaining of L side buttock bruising and pain, tailbone pain and low back pain.  She has a Hx of low back pain and muscle spasms in the past.  The pain is worse with sitting.  She has been using Ibuprofen with little relief.  She denies any numbness, tingling or weakness in the legs.  No loss of bowel or bladder control.      ROS:      Review of Systems   Constitutional: Negative for chills and fever.   HENT: Negative for congestion, ear pain, rhinorrhea and sore throat.    Eyes: Negative.    Respiratory: Negative.  Negative for cough and shortness of breath.    Cardiovascular: Negative.  Negative for chest pain and palpitations.   Gastrointestinal: Negative for diarrhea, nausea and vomiting.   Endocrine: Negative.    Genitourinary: Negative.    Musculoskeletal:  Positive for back pain. Negative for arthralgias, joint swelling, myalgias, neck pain and neck stiffness.   Skin: Negative.  Negative for rash and wound.   Allergic/Immunologic: Negative.  Negative for immunocompromised state.   Neurological: Negative for dizziness, weakness, light-headedness and headaches.        Family History   Family History   Problem Relation Age of Onset     Glaucoma Mother      Hypertension Mother      Hypertension Father      Cancer Sister      Diabetes Daughter        Social History  Social History     Socioeconomic History     Marital status:      Spouse name:  (June 2013, Jignesh)     Number of children: 4     Years of education: Not on file     Highest education level: Not on file   Occupational History     Not on file   Tobacco Use     Smoking status: Current Every Day Smoker     Start date: 1/9/2012     Smokeless tobacco: Never Used   Substance and Sexual Activity     Alcohol use: Yes     Alcohol/week: 0.0 standard drinks     Drug use: No     Sexual activity: Not Currently   Other Topics Concern     Parent/sibling w/ CABG, MI or angioplasty before 65F 55M? Not Asked   Social History Narrative     Not on file     Social Determinants of Health     Financial Resource Strain: Not on file   Food Insecurity: Not on file   Transportation Needs: Not on file   Physical Activity: Not on file   Stress: Not on file   Social Connections: Not on file   Intimate Partner Violence: Not on file   Housing Stability: Not on file        Surgical History:  No past surgical history on file.     Problem List:  Patient Active Problem List   Diagnosis     CARDIOVASCULAR SCREENING; LDL GOAL LESS THAN 160     Advance care planning     Psychophysiological insomnia     Tobacco use disorder     Major depressive disorder, recurrent episode, mild (H)     Anxiety           OBJECTIVE:     Vital signs noted and reviewed by Tom Siddiqui PA-C  BP (!) 144/82 (BP Location: Left arm, Patient Position: Sitting,  Cuff Size: Adult Regular)   Pulse 83   Temp 97.7  F (36.5  C) (Tympanic)   Resp 16   Wt 55.4 kg (122 lb 3.2 oz)   LMP  (LMP Unknown)   SpO2 98%   BMI 22.53 kg/m       PEFR:    Physical Exam  Vitals and nursing note reviewed.   Constitutional:       General: She is not in acute distress.     Appearance: She is well-developed. She is not ill-appearing, toxic-appearing or diaphoretic.   HENT:      Head: Normocephalic and atraumatic.      Right Ear: Tympanic membrane and external ear normal. No drainage, swelling or tenderness. Tympanic membrane is not perforated, erythematous, retracted or bulging.      Left Ear: Tympanic membrane and external ear normal. No drainage, swelling or tenderness. Tympanic membrane is not perforated, erythematous, retracted or bulging.      Nose: No mucosal edema, congestion or rhinorrhea.      Right Sinus: No maxillary sinus tenderness or frontal sinus tenderness.      Left Sinus: No maxillary sinus tenderness or frontal sinus tenderness.      Mouth/Throat:      Pharynx: No pharyngeal swelling, oropharyngeal exudate, posterior oropharyngeal erythema or uvula swelling.      Tonsils: No tonsillar abscesses.   Eyes:      Pupils: Pupils are equal, round, and reactive to light.   Neck:      Trachea: Trachea normal.   Cardiovascular:      Rate and Rhythm: Normal rate and regular rhythm.      Heart sounds: Normal heart sounds, S1 normal and S2 normal. No murmur heard.  No friction rub. No gallop.    Pulmonary:      Effort: Pulmonary effort is normal. No respiratory distress.      Breath sounds: Normal breath sounds. No decreased breath sounds, wheezing, rhonchi or rales.   Abdominal:      General: Bowel sounds are normal. There is no distension.      Palpations: Abdomen is soft. Abdomen is not rigid. There is no mass.      Tenderness: There is no abdominal tenderness. There is no guarding or rebound.   Musculoskeletal:      Cervical back: Full passive range of motion without pain, normal  range of motion and neck supple.      Lumbar back: Spasms and tenderness present. No swelling, edema, deformity, signs of trauma or bony tenderness. Normal range of motion. Negative right straight leg raise test and negative left straight leg raise test.        Back:    Lymphadenopathy:      Cervical: No cervical adenopathy.   Skin:     General: Skin is warm and dry.   Neurological:      Mental Status: She is alert and oriented to person, place, and time.      Cranial Nerves: No cranial nerve deficit.      Motor: Motor function is intact. No weakness, atrophy or abnormal muscle tone.      Coordination: Coordination normal.      Gait: Gait is intact. Gait normal.      Deep Tendon Reflexes: Reflexes are normal and symmetric.      Reflex Scores:       Patellar reflexes are 2+ on the right side and 2+ on the left side.       Achilles reflexes are 2+ on the right side and 2+ on the left side.  Psychiatric:         Behavior: Behavior normal. Behavior is cooperative.         Thought Content: Thought content normal.         Judgment: Judgment normal.             Tom Siddiqui PA-C  1/31/2022, 5:29 PM

## 2022-02-01 ENCOUNTER — TELEPHONE (OUTPATIENT)
Dept: FAMILY MEDICINE | Facility: CLINIC | Age: 62
End: 2022-02-01
Payer: COMMERCIAL

## 2022-02-01 ENCOUNTER — VIRTUAL VISIT (OUTPATIENT)
Dept: FAMILY MEDICINE | Facility: CLINIC | Age: 62
End: 2022-02-01
Payer: COMMERCIAL

## 2022-02-01 DIAGNOSIS — S39.92XD INJURY OF LOW BACK, SUBSEQUENT ENCOUNTER: Primary | ICD-10-CM

## 2022-02-01 PROCEDURE — 99207 PR NON-BILLABLE SERV PER CHARTING: CPT | Performed by: PHYSICIAN ASSISTANT

## 2022-02-01 RX ORDER — NAPROXEN 500 MG/1
500 TABLET ORAL 2 TIMES DAILY WITH MEALS
Qty: 40 TABLET | Refills: 1 | Status: SHIPPED | OUTPATIENT
Start: 2022-02-01 | End: 2022-06-22

## 2022-02-01 RX ORDER — HYDROCODONE BITARTRATE AND ACETAMINOPHEN 5; 325 MG/1; MG/1
1 TABLET ORAL EVERY 6 HOURS PRN
Qty: 15 TABLET | Refills: 0 | Status: SHIPPED | OUTPATIENT
Start: 2022-02-01 | End: 2022-02-04

## 2022-02-01 NOTE — TELEPHONE ENCOUNTER
"Patient called to clinic regarding her ongoing back pain. Was seen in  yesterday for evaluation, had fallen down carpeted stairs at home.   Given Toradol injection at UC visit and prescribed Flexeril and advised to take IBU or Tylenol and use ice. Follow up if not improving.    Patient called to ask if she could get anything else prescribed to help with her back pain or if she could get another Toradol injection ordered.    Writer discussed with patient that a follow up visit with primary care would be needed as  does not prescribe or order treatments after visit is complete, Only offer acute services at time of visit.  Patient voiced understanding.  Having back pain today, is \"creeping up\" back, now radiating to mid back.  Rates pain 8/10 on pain scale. Depends on how she moves. Certain movements make pain more severe. When sitting still, is \"okay\".  Took Flexeril tablet last night, did help her get some sleep.  In more pain now today. Has not taken any IBU, said she was going to take some tablets now.    Writer offered virtual visit follow up with primary care today, to further discuss. Patient wants to ask about any temporary pain treatments, stronger than Ibuprofen or Tylenol.  Writer scheduled patient for telephone visit for today with Princess Thornton PA-C at 2:00 pm.        [place] RN  [facility]      "

## 2022-02-01 NOTE — PROGRESS NOTES
Paco is a 62 year old who is being evaluated via a billable telephone visit.      What phone number would you like to be contacted at?   How would you like to obtain your AVS? [place]    Assessment & Plan   Problem List Items Addressed This Visit     None      Visit Diagnoses     Injury of low back, subsequent encounter    -  Primary    Relevant Medications    HYDROcodone-acetaminophen (NORCO) 5-325 MG tablet    naproxen (NAPROSYN) 500 MG tablet         Naproxen 500 mg twice a day with food as needed  Norco 1 tablet every 6 hours as needed   Flexir 10 mg three times a day as needed   To muscle spasms apply heat, to the contusion area apply ice      15 minutes spent on the date of the encounter doing chart review, history and exam, documentation and further activities per the note       Tobacco Cessation:   reports that she has been smoking. She started smoking about 10 years ago. She has never used smokeless tobacco.          Return in about 1 week (around 2/8/2022), or if symptoms worsen or fail to improve.    Princess Thornton PA-C  [facility]    Da Antonio is a 62 year old who presents for the following health issues     HPI     Concern - low back spasms/pain   Onset: 4-5 days  Description: patient slipped on stairs and fell on her buttocks. Next day she developed lower back spasms and  Pain   Intensity: moderate, severe  Progression of Symptoms:  same  Accompanying Signs & Symptoms: none  Previous history of similar problem: no  Precipitating factors:        Worsened by: movement  Alleviating factors:        Improved by: rest, pain medications   Therapies tried and outcome: Toradol shot helped temporarily, felxeril helps to sleep     Patient was seen in Urgent care yesterday, was examined and received Toradol shot.   Pain improved overnight, but then worsened again. Patient is asking for better pain management.       Review of Systems   Constitutional, HEENT,  cardiovascular, pulmonary, gi and gu systems are negative, except as otherwise noted.      Objective           Vitals:  No vitals were obtained today due to virtual visit.    Physical Exam   healthy, alert and no distress  PSYCH: Alert and oriented times 3; coherent speech, normal   rate and volume, able to articulate logical thoughts, able   to abstract reason, no tangential thoughts, no hallucinations   or delusions  Her affect is normal  RESP: No cough, no audible wheezing, able to talk in full sentences  Remainder of exam unable to be completed due to telephone visits                Phone call duration: 15 minutes

## 2022-03-04 ENCOUNTER — TELEPHONE (OUTPATIENT)
Dept: FAMILY MEDICINE | Facility: CLINIC | Age: 62
End: 2022-03-04

## 2022-03-04 ENCOUNTER — VIRTUAL VISIT (OUTPATIENT)
Dept: FAMILY MEDICINE | Facility: CLINIC | Age: 62
End: 2022-03-04
Payer: COMMERCIAL

## 2022-03-04 ENCOUNTER — TELEPHONE (OUTPATIENT)
Dept: FAMILY MEDICINE | Facility: CLINIC | Age: 62
End: 2022-03-04
Payer: COMMERCIAL

## 2022-03-04 DIAGNOSIS — J01.01 ACUTE RECURRENT MAXILLARY SINUSITIS: Primary | ICD-10-CM

## 2022-03-04 PROCEDURE — 99213 OFFICE O/P EST LOW 20 MIN: CPT | Mod: 95 | Performed by: NURSE PRACTITIONER

## 2022-03-04 RX ORDER — DOXYCYCLINE HYCLATE 100 MG
100 TABLET ORAL 2 TIMES DAILY
Qty: 20 TABLET | Refills: 0 | Status: SHIPPED | OUTPATIENT
Start: 2022-03-04 | End: 2022-03-14

## 2022-03-04 NOTE — TELEPHONE ENCOUNTER
Pt called because she is having symptoms of a sinus infection and is looking to get medication to help symptoms.    She has been using a nasal spray and it helps a little.    Scheduled virtual for today.    Diana Conroy RN  [facility]

## 2022-03-04 NOTE — PROGRESS NOTES
Paco is a 62 year old who is being evaluated via a billable telephone visit.      What phone number would you like to be contacted at? [phone]    How would you like to obtain your AVS? Mail a copy    Subjective   Paco is a 62 year old who presents for the following health issues     HPI     Acute Illness - sinus infection   Acute illness concerns: possible sinus infection  Onset/Duration: thinks since started from [place] about 2 weeks   Also got Barotrauma due to sudden cabin air pressure  and has felt really run down   Using her nasal steroid faithfully    Symptoms:  Fever: no  Chills/Sweats: no  Headache (location?): no  Sinus Pressure: YES  Conjunctivitis:  no  Ear Pain: no  Rhinorrhea: no  Congestion: yes   Sore Throat: no  Cough: no  Wheeze: no  Decreased Appetite: no  Nausea: no  Vomiting: no  Diarrhea: no  Dysuria/Freq.: no  Dysuria or Hematuria: no  Fatigue/Achiness: no  Sick/Strep Exposure: no  Therapies tried and outcome: nasal steroid     Labs reviewed in EPIC  BP Readings from Last 3 Encounters:   01/31/22 (!) 144/82   06/27/21 (!) 143/86   06/15/21 119/77    Wt Readings from Last 3 Encounters:   01/31/22 55.4 kg (122 lb 3.2 oz)   06/15/21 57.2 kg (126 lb)   10/05/20 57.2 kg (126 lb)                  Patient Active Problem List   Diagnosis     CARDIOVASCULAR SCREENING; LDL GOAL LESS THAN 160     Advance care planning     Psychophysiological insomnia     Tobacco use disorder     Major depressive disorder, recurrent episode, mild (H)     Anxiety     No past surgical history on file.    Social History     Tobacco Use     Smoking status: Current Every Day Smoker     Start date: 1/9/2012     Smokeless tobacco: Never Used   Substance Use Topics     Alcohol use: Yes     Alcohol/week: 0.0 standard drinks     Family History   Problem Relation Age of Onset     Glaucoma Mother      Hypertension Mother      Hypertension Father      Cancer Sister      Diabetes Daughter          Current Outpatient  Medications   Medication Sig Dispense Refill     cyclobenzaprine (FLEXERIL) 10 MG tablet Take 1 tablet (10 mg) by mouth 3 times daily as needed for muscle spasms 30 tablet 0     Ibuprofen (ADVIL PO)        ipratropium (ATROVENT) 0.06 % nasal spray Spray 2 sprays into both nostrils 4 times daily (Patient not taking: Reported on 1/31/2022) 15 mL 0     loratadine (CLARITIN) 10 MG tablet Take 10 mg by mouth daily       meloxicam (MOBIC) 7.5 MG tablet Take 1 tablet (7.5 mg) by mouth daily with food as needed for back or joint pain. (Patient not taking: Reported on 1/31/2022) 30 tablet 1     mometasone (NASONEX) 50 MCG/ACT nasal spray SHAKE LIQUID AND USE 2 SPRAYS IN EACH NOSTRIL DAILY (Patient not taking: Reported on 1/31/2022) 17 g 5     naproxen (NAPROSYN) 500 MG tablet Take 1 tablet (500 mg) by mouth 2 times daily (with meals) 40 tablet 1     tiZANidine (ZANAFLEX) 4 MG tablet Take 1 tablet (4 mg) by mouth 3 times daily as needed for muscle spasms (Patient not taking: Reported on 1/31/2022) 40 tablet 0     zolpidem (AMBIEN) 5 MG tablet TAKE 1/2 TO 1 TABLET(2.5 TO 5 MG) BY MOUTH EVERY NIGHT AT BEDTIME AS NEEDED FOR SLEEP 30 tablet 5     zolpidem ER (AMBIEN CR) 6.25 MG CR tablet Take 1 tablet (6.25 mg) by mouth nightly as needed for sleep 30 tablet 0     Allergies   Allergen Reactions     Azithromycin Nausea     Erythromycin      gi upset     Septra [Sulfamethoxazole W-Trimethoprim] Rash       Review of Systems   CONSTITUTIONAL:POSITIVE  for chills and sweats and NEGATIVE  for fever   ENT/MOUTH: POSITIVE for Hx sinus infections, nasal congestion, postnasal drainage and sinus pressure and NEGATIVE for epistaxis and fever  RESP:NEGATIVE for significant cough or SOB  CV: NEGATIVE for chest pain/chest pressure  GI: NEGATIVE for nausea and vomiting  NEURO: NEGATIVE for dizziness/lightheadedness  ENDOCRINE: NEGATIVE for temperature intolerance, skin/hair changes  HEME/ALLERGY/IMMUNE: NEGATIVE for bleeding problems and POSITIVE   for allergies      Objective           Vitals:  No vitals were obtained today due to virtual visit.    Physical Exam   alert, active, no distress and fatigued  PSYCH: Alert and oriented times 3; coherent speech, normal   rate and volume, able to articulate logical thoughts, able   to abstract reason, no tangential thoughts, no hallucinations   or delusions  Her affect is normal and pleasant  RESP: No cough, no audible wheezing, able to talk in full sentences  Remainder of exam unable to be completed due to telephone visits    Diagnostic Test Results:   Diagnostic Test Results:  Labs reviewed in Epic  none       Assessment & Plan     Acute recurrent maxillary sinusitis  Will Start:  - doxycycline hyclate (VIBRA-TABS) 100 MG tablet  Dispense: 20 tablet; Refill: 0  Discussed the nature and pathophysiology of sinusitis and treatment including the role of antibiotics and the need to get over the underlying trigger, URI or allergies.    Prescription drug management   Time spent doing chart review, history and exam, documentation and further activities per the note       Tobacco Cessation:   reports that she has been smoking. She started smoking about 10 years ago. She has never used smokeless tobacco.  Tobacco Cessation Action Plan: Information offered: Patient not interested at this time    See Patient Instructions  Patient Instructions     PLAN:   1.   Symptomatic therapy suggested: rest, increase fluids, apply heat to sinuses prn, OTC saline nasal spray as needed and call prn if symptoms persist or worsen.  2.  Orders Placed This Encounter   Medications     doxycycline hyclate (VIBRA-TABS) 100 MG tablet     Sig: Take 1 tablet (100 mg) by mouth 2 times daily for 10 days     Dispense:  20 tablet     Refill:  0     3. Patient needs to follow up in if no improvement,or sooner if worsening of symptoms or other symptoms develop.      Return in about 1 week (around 3/11/2022), or if symptoms worsen or fail to  improve.    SHUBHAM Crawford CNP  [facility]          Phone call duration: 9 minutes

## 2022-03-04 NOTE — PATIENT INSTRUCTIONS
PLAN:   1.   Symptomatic therapy suggested: rest, increase fluids, apply heat to sinuses prn, OTC saline nasal spray as needed and call prn if symptoms persist or worsen.  2.  Orders Placed This Encounter   Medications     doxycycline hyclate (VIBRA-TABS) 100 MG tablet     Sig: Take 1 tablet (100 mg) by mouth 2 times daily for 10 days     Dispense:  20 tablet     Refill:  0     3. Patient needs to follow up in if no improvement,or sooner if worsening of symptoms or other symptoms develop.

## 2022-05-11 DIAGNOSIS — F51.04 PSYCHOPHYSIOLOGICAL INSOMNIA: ICD-10-CM

## 2022-05-12 NOTE — TELEPHONE ENCOUNTER
Routing refill request to provider for review/approval because:  Drug not on the G refill protocol           Pending Prescriptions:                       Disp   Refills    zolpidem (AMBIEN) 5 MG tablet [Pharmacy Me*30 tab*         Sig: TAKE 1/2 TO 1 TABLET(2.5 TO 5 MG) BY MOUTH EVERY           NIGHT AT BEDTIME AS NEEDED FOR SLEEP        Kit STELLA Hays

## 2022-05-19 NOTE — TELEPHONE ENCOUNTER
Patient calling on this refill request, patient almost out of meds.  Elvi Meza [facility]  2nd Floor  Primary Care

## 2022-05-23 RX ORDER — ZOLPIDEM TARTRATE 5 MG/1
TABLET ORAL
Qty: 5 TABLET | Refills: 0 | Status: SHIPPED | OUTPATIENT
Start: 2022-05-23 | End: 2022-05-31 | Stop reason: DRUGHIGH

## 2022-05-23 NOTE — TELEPHONE ENCOUNTER
It has been about a year since she was seen by Dr. Desai.  She needs a visit for this controlled substance. I sent 5 but she needs an appt.    Rosalia Salas M.D.

## 2022-05-23 NOTE — TELEPHONE ENCOUNTER
Patient calling again on her request.  Elvi Meza MA  [facility]  2nd Floor  Primary Care

## 2022-05-23 NOTE — TELEPHONE ENCOUNTER
Called and left a voicemail message that 5 pills were sent to her pharmacy and to schedule an appt for further refills.  Elvi Meza MA  [facility]  2nd Floor  Primary Care

## 2022-05-23 NOTE — TELEPHONE ENCOUNTER
Patient calling, can't sleep needs this Rx today please advise.  Elvi Meza [facility]  2nd Floor  Primary Care

## 2022-05-26 RX ORDER — ZOLPIDEM TARTRATE 5 MG/1
TABLET ORAL
Qty: 5 TABLET | Refills: 0 | Status: CANCELLED | OUTPATIENT
Start: 2022-05-26

## 2022-05-26 NOTE — TELEPHONE ENCOUNTER
Patient is calling to request refill on ambien, states she scheduled an upcoming appointment with Dr. Desai on 6/7. Patient was given 5 tabs on 5/23/22 but states this will only last her until tomorrow. Patient is requesting prescription be refilled with enough tabs to get her to upcoming appointment, states she is not able to sleep without medication and works overnight, so sleeps during the day.  Maribell Morel RN  [facility]      Appointments in Next Year    May 26, 2022  8:00 AM  (Arrive by 7:40 AM)  Provider Visit with Rodney Desai MD  [facility] ([facility] ) [phone]   Jun 07, 2022  5:30 PM  (Arrive by 5:10 PM)  Provider Visit with Rodney Desai MD  [facility] ([facility] ) [phone]

## 2022-05-26 NOTE — TELEPHONE ENCOUNTER
Patient has an appt scheduled for 6/7/2022. Patient not active on My Chart and can not answer the questions and wants a refill until the appointment on 6/7/2022  Elvi Meza [facility]  2nd Floor  Primary Care

## 2022-05-27 DIAGNOSIS — F51.04 PSYCHOPHYSIOLOGICAL INSOMNIA: ICD-10-CM

## 2022-05-31 RX ORDER — ZOLPIDEM TARTRATE 6.25 MG/1
6.25 TABLET, FILM COATED, EXTENDED RELEASE ORAL
Qty: 30 TABLET | Refills: 0 | Status: SHIPPED | OUTPATIENT
Start: 2022-05-31 | End: 2022-06-07

## 2022-06-07 ENCOUNTER — VIRTUAL VISIT (OUTPATIENT)
Dept: FAMILY MEDICINE | Facility: CLINIC | Age: 62
End: 2022-06-07
Payer: COMMERCIAL

## 2022-06-07 DIAGNOSIS — F51.04 PSYCHOPHYSIOLOGICAL INSOMNIA: ICD-10-CM

## 2022-06-07 PROCEDURE — 99213 OFFICE O/P EST LOW 20 MIN: CPT | Mod: 95 | Performed by: FAMILY MEDICINE

## 2022-06-07 RX ORDER — ZOLPIDEM TARTRATE 6.25 MG/1
6.25 TABLET, FILM COATED, EXTENDED RELEASE ORAL
Qty: 30 TABLET | Refills: 4 | Status: SHIPPED | OUTPATIENT
Start: 2022-06-07 | End: 2022-07-20 | Stop reason: ALTCHOICE

## 2022-06-07 NOTE — PROGRESS NOTES
Paco is a 62 year old who is being evaluated via a billable telephone visit.      What phone number would you like to be contacted at? [phone]   How would you like to obtain your AVS? Mail a copy    Assessment & Plan     (F51.04) Psychophysiological insomnia  Comment:   Plan: zolpidem ER (AMBIEN CR) 6.25 MG CR tablet              24 minutes spent on the date of the encounter doing chart review, patient visit and documentation        No follow-ups on file.    Rodney Desai MD  [facility]    Da Antonio is a 62 year old who presents for the following health issues     HPI     Medication Followup of  Ambien    Taking Medication as prescribed: yes    Side Effects:  None    Medication Helping Symptoms:  yes         Review of Systems         Objective           Vitals:  No vitals were obtained today due to virtual visit.    Physical Exam   healthy, alert and no distress  PSYCH: Alert and oriented times 3; coherent speech, normal   rate and volume, able to articulate logical thoughts, able   to abstract reason, no tangential thoughts, no hallucinations   or delusions  Her affect is normal and pleasant  RESP: No cough, no audible wheezing, able to talk in full sentences  Remainder of exam unable to be completed due to telephone visits                Phone call duration: 6 minutes

## 2022-06-07 NOTE — PATIENT INSTRUCTIONS
At [facility], we strive to deliver an exceptional experience to you, every time we see you. If you receive a survey, please complete it as we do value your feedback.  If you have MyChart, you can expect to receive results automatically within 24 hours of their completion.  Your provider will send a note interpreting your results as well.   If you do not have MyChart, you should receive your results in about a week by mail.    Your care team:                            Family Medicine Internal Medicine   MD Colton Brito MD Shantel Branch-Fleming, MD Srinivasa Vaka, MD Katya Belousova, SHUBHAM Zavala CNP, MD ([facility]   [facility] [place], MD Sue See MD Amelia Massimini APRN CNP Kim Thein, APRN CNP Bethany Templen, MD             Clinic hours: Monday - Thursday 7 am-6 pm; Fridays 7 am-5 pm.   Urgent care: Monday - Friday 10 am- 8 pm; Saturday and Sunday 9 am-5 pm.    Clinic: [phone]       [facility]: Monday - Thursday 8 am - 7 pm; Friday 8 am - 6 pm  [facility]: [phone]

## 2022-06-18 ENCOUNTER — NURSE TRIAGE (OUTPATIENT)
Dept: NURSING | Facility: CLINIC | Age: 62
End: 2022-06-18
Payer: COMMERCIAL

## 2022-06-18 ENCOUNTER — OFFICE VISIT (OUTPATIENT)
Dept: URGENT CARE | Facility: URGENT CARE | Age: 62
End: 2022-06-18
Payer: COMMERCIAL

## 2022-06-18 VITALS
BODY MASS INDEX: 22.94 KG/M2 | SYSTOLIC BLOOD PRESSURE: 130 MMHG | HEART RATE: 109 BPM | OXYGEN SATURATION: 100 % | WEIGHT: 124.4 LBS | DIASTOLIC BLOOD PRESSURE: 84 MMHG | TEMPERATURE: 98.3 F

## 2022-06-18 DIAGNOSIS — S91.112D LACERATION OF LEFT GREAT TOE WITHOUT FOREIGN BODY PRESENT OR DAMAGE TO NAIL, SUBSEQUENT ENCOUNTER: ICD-10-CM

## 2022-06-18 DIAGNOSIS — M79.675 PAIN OF TOE OF LEFT FOOT: Primary | ICD-10-CM

## 2022-06-18 PROCEDURE — 99213 OFFICE O/P EST LOW 20 MIN: CPT | Performed by: INTERNAL MEDICINE

## 2022-06-18 RX ORDER — HYDROCODONE BITARTRATE AND ACETAMINOPHEN 5; 325 MG/1; MG/1
1-2 TABLET ORAL
Qty: 10 TABLET | Refills: 0 | Status: SHIPPED | OUTPATIENT
Start: 2022-06-18 | End: 2022-06-22

## 2022-06-18 NOTE — PROGRESS NOTES
ASSESSMENT AND PLAN:      ICD-10-CM    1. Pain of toe of left foot  M79.675    2. Laceration of left great toe without foreign body present or damage to nail, subsequent encounter  S91.112D      Sutures placed at outside ER  PLAN:  She may continue ibuprofen as needed for pain  Vicodin given for breakthrough plain especially for bedtime as she is unable to sleep  She has missed only 1 day of work due to injury and was given work absence note from ER.   I also gave her a 2-day work note for next week so she does not have to stand prolonged on her feet    There are no Patient Instructions on file for this visit.  No follow-ups on file.        Alissa Stern MD  [facility] URGENT CARE    Subjective     Paco Medrano is a 62 year old who presents for Patient presents with:  Foot Pain: Pt had stitches placed in left big toe, pain is getting worse unable to sleep. Pt toe was hit by the door at the gas station.     an established patient of [facility].    Seen in ER for toe laceration 2 days ago 6/16/2021 she sustained at a gas station after work  xrays reported normal   After Novocain wore off she developed severe pain.  Pain throbbing at night.  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        Objective    /84 (BP Location: Left arm, Patient Position: Sitting, Cuff Size: Adult Regular)   Pulse 109   Temp 98.3  F (36.8  C) (Tympanic)   Wt 56.4 kg (124 lb 6.4 oz)   LMP  (LMP Unknown)   SpO2 100%   BMI 22.94 kg/m    Physical Exam  Vitals reviewed.   Constitutional:       Appearance: Normal appearance.   Neurological:      Mental Status: She is alert.          Significant bruising noted of medial distal left toe  Sutures noted with crusting at areas of  laceration      Requested medical assistant clean and dress wound for patient

## 2022-06-18 NOTE — LETTER
[facility] URGENT CARE [address] [address]  Phone: [phone]    June 18, 2022        Paco Medrano  [address] [facility] [address]          To whom it may concern:    RE: Paco Medrano    Patient was seen and treated today at our clinic.  Please excuse 6/20 & 6/21/2022 work absence.    Please contact me for questions or concerns.      Sincerely,        Alissa Stern MD

## 2022-06-18 NOTE — TELEPHONE ENCOUNTER
Pt phoning because she recently needed stiches on her left big toe    Pt states that she is in pain at night and is unable to sleep     Pt has tried tylenol and ibuprofen with no relief     Rates pain 10/10    Per protocol - See HCP within 4 hours     Pt states that she will go to [facility] when open today     Care advice given per protocol and when to call back. Pt verbalized understanding and agrees to plan of care.    Tameka Hummel RN  [place] [facility]  4:39 AM 6/18/2022      COVID 19 Nurse Triage Plan/Patient Instructions    Please be aware that novel coronavirus (COVID-19) may be circulating in the community. If you develop symptoms such as fever, cough, or SOB or if you have concerns about the presence of another infection including coronavirus (COVID-19), please contact your health care provider or visit [email or web address].     Disposition/Instructions    In-Person Visit with provider recommended. Reference Visit Selection Guide.    Thank you for taking steps to prevent the spread of this virus.  o Limit your contact with others.  o Wear a simple mask to cover your cough.  o Wash your hands well and often.    Resources    [facility]: About COVID-19: [email or web address]    CDC: What to Do If You're Sick: www.cdc.gov/coronavirus/2019-ncov/about/steps-when-sick.html    CDC: Ending Home Isolation: www.cdc.gov/coronavirus/2019-ncov/hcp/disposition-in-home-patients.html     CDC: Caring for Someone: www.cdc.gov/coronavirus/2019-ncov/if-you-are-sick/care-for-someone.html     [facility]: Interim Guidance for Hospital Discharge to Home: www.health.state.[place]/diseases/coronavirus/hcp/hospdischarge.pdf    [facility] clinical trials (COVID-19 research studies): [email or web address]     Below are the COVID-19 hotlines at the [place] Department of Health ([facility]). Interpreters are available.   o For health questions: Call 502-104-0007 or 1-779.174.5455 (7 a.m.  to 7 p.m.)  o For questions about schools and childcare: Call 528-217-7620 or 1-197.913.2051 (7 a.m. to 7 p.m.)                               Reason for Disposition    [1] SEVERE pain (e.g., excruciating, unable to do any normal activities) AND [2] not improved after 2 hours of pain medicine    Additional Information    Negative: Followed a toe injury    Negative: Wound looks infected    Negative: Caused by an animal bite    Negative: Caused by frostbite    Negative: Caused by an ingrown toenail    Negative: Athlete's Foot suspected (i.e., itchy red rash in web space between toes)    Negative: Foot pain is main symptom    Negative: Foot is cool or blue in comparison to other foot    Negative: Purple or black skin on toe  (Exception: simple recalled injury with bruise)    Negative: [1] Looks infected (e.g., spreading redness, red streak, pus) AND [2] fever    Negative: Patient sounds very sick or weak to the triager    Protocols used: TOE PAIN-A-AH

## 2022-06-22 ENCOUNTER — OFFICE VISIT (OUTPATIENT)
Dept: URGENT CARE | Facility: URGENT CARE | Age: 62
End: 2022-06-22
Payer: COMMERCIAL

## 2022-06-22 ENCOUNTER — NURSE TRIAGE (OUTPATIENT)
Dept: NURSING | Facility: CLINIC | Age: 62
End: 2022-06-22

## 2022-06-22 VITALS
HEART RATE: 75 BPM | BODY MASS INDEX: 22.08 KG/M2 | TEMPERATURE: 98.1 F | OXYGEN SATURATION: 99 % | SYSTOLIC BLOOD PRESSURE: 132 MMHG | HEIGHT: 63 IN | WEIGHT: 124.6 LBS | DIASTOLIC BLOOD PRESSURE: 86 MMHG

## 2022-06-22 DIAGNOSIS — L03.032 PARONYCHIA OF TOE, LEFT: Primary | ICD-10-CM

## 2022-06-22 DIAGNOSIS — S91.212D LACERATION OF LEFT GREAT TOE WITHOUT FOREIGN BODY WITH DAMAGE TO NAIL, SUBSEQUENT ENCOUNTER: ICD-10-CM

## 2022-06-22 DIAGNOSIS — M79.675 PAIN OF TOE OF LEFT FOOT: ICD-10-CM

## 2022-06-22 PROCEDURE — 99213 OFFICE O/P EST LOW 20 MIN: CPT | Mod: 25 | Performed by: INTERNAL MEDICINE

## 2022-06-22 PROCEDURE — 10060 I&D ABSCESS SIMPLE/SINGLE: CPT | Mod: LT | Performed by: INTERNAL MEDICINE

## 2022-06-22 RX ORDER — HYDROCODONE BITARTRATE AND ACETAMINOPHEN 5; 325 MG/1; MG/1
1 TABLET ORAL
Qty: 5 TABLET | Refills: 0 | Status: SHIPPED | OUTPATIENT
Start: 2022-06-22 | End: 2022-06-27

## 2022-06-22 NOTE — PROGRESS NOTES
"ASSESSMENT AND PLAN:      ICD-10-CM    1. Paronychia of toe, left  L03.032 amoxicillin-clavulanate (AUGMENTIN) 875-125 MG tablet     DRAIN SKIN ABSCESS SIMPLE/SINGLE   2. Pain of toe of left foot  M79.675 HYDROcodone-acetaminophen (NORCO) 5-325 MG tablet   3. Laceration of left great toe without foreign body with damage to nail, subsequent encounter  S91.212D      PLAN:       Patient Instructions       Area drained.  Clear fluid    Soak toe in warm soapy water 15 minutes few times day  3 day course antibiotics Augmentin 2 x day.  Yogurt    Toe nail may fall off    Consider suture removal day 10-14 for suture removal          No follow-ups on file.        Alissa Stern MD  [facility] URGENT CARE    Subjective     Paco Medrano is a 62 year old who presents for Patient presents with:  Infected Big Toe: Pt said that she think that left big toe is infected.    an established patient of [facility].    6 days ago had toe laceration repair at ER  Seen 4 days ago here by myself for pain  Today concern toe infection   Noticed white blister.  Continues with pain    Location: left toe        Objective    /86 (BP Location: Left arm, Patient Position: Sitting, Cuff Size: Adult Regular)   Pulse 75   Temp 98.1  F (36.7  C) (Tympanic)   Ht 1.6 m (5' 3\")   Wt 56.5 kg (124 lb 9.6 oz)   LMP  (LMP Unknown)   SpO2 99%   BMI 22.07 kg/m    Physical Exam  Vitals reviewed.   Constitutional:       Appearance: Normal appearance.   Musculoskeletal:      Comments: left great toe  Laceration healing well with sutures  Swelling & ecchymosis    At base toe nail, pus noted with tenderness   Neurological:      Mental Status: She is alert.              Procedure note  Betadine  Ethyl chloride anesthetic  11 blade incision  Clear fluid expressed.  Cleaned with betadine again  Area dressed by M.A          "

## 2022-06-22 NOTE — PATIENT INSTRUCTIONS
Area drained.  Clear fluid so lower suspicion for infection    Soak toe in warm soapy water 15 minutes few times day  3 day course antibiotics Augmentin 2 x day.  Yogurt    Toe nail may fall off    Vicodin at bedtime prn 5 tablets    Consider suture removal day 10-14 for suture removal

## 2022-06-22 NOTE — TELEPHONE ENCOUNTER
Nurse Triage    Is this a 2nd Level Triage? NO    Situation: Patient calling with concerns for foot pain and possible infection of foot wound.    Background: Pt sustained a cut to her right great toe on 6/16. She was seen in the ED on this date and received stitches. Pt states she's been unable to sleep due to the pain.     Assessment: Patient states that her right great toe is red all the way around. She states there is swelling near the bottom of her nail and that the toe is warm to touch, warmer than the other toes. She states that it looks like there are fluid filled areas and she wonders if it might be infected. Patient has not taken her temperature, she states she doesn't typically run fevers.     Protocol Recommended Disposition:   See HCP Within 4 Hours (Or PCP Triage)    Recommendation: Advised patient to make an appointment. Transferred to scheduling. Pt advised to go to  if no appointments are available for today. Care advice given. Reviewed concerning symptoms and when to call back.     Sera Abrams RN [facility] 6/22/2022 7:02 AM    Reason for Disposition    [1] Looks infected AND [2] large red area or streak (>2 inches or 5 cm)    Additional Information    Negative: [1] Major bleeding (e.g., actively dripping or spurting) AND [2] can't be stopped    Negative: Amputation    Negative: Shock suspected (e.g., cold/pale/clammy skin, too weak to stand, low BP, rapid pulse)    Negative: Sounds like a life-threatening emergency to the triager    Negative: [1] Animal bite AND [2] broken skin    Negative: [1] Human bite AND [2] broken skin    Negative: Injury is a puncture wound    Negative: Foreign body in skin (e.g., splinter, sliver)    Negative: Bruises not from an injury    Negative: Wound looks infected    Negative: Electrical burn    Negative: Chemical burn    Negative: Thermal burn    Negative: [1] Bleeding AND [2] won't stop after 10 minutes of direct pressure (using correct  technique)    Negative: Skin is split open or gaping (or length > 1/2 inch or 12 mm on the skin, 1/4 inch or 6 mm on the face)    Negative: [1] Deep cut AND [2] can see bone or tendons    Negative: [1] Dirt in the wound AND [2] not removed with 15 minutes of scrubbing    Negative: Skin loss involves more than 10% of surface area (Note: the palm of the hand = 1%)    Negative: High pressure injection injury (e.g., from paint gun, usually work-related)    Negative: Wound causes numbness (i.e., loss of sensation)    Negative: Wound causes weakness (i.e., decreased ability to move hand, finger, toe)    Negative: Sounds like a serious injury to the triager    Negative: [1] SEVERE pain AND [2] not improved 2 hours after pain medicine/ice packs    Protocols used: SKIN INJURY-A-AH

## 2022-07-05 ENCOUNTER — TELEPHONE (OUTPATIENT)
Dept: FAMILY MEDICINE | Facility: CLINIC | Age: 62
End: 2022-07-05

## 2022-07-05 NOTE — LETTER
July 5, 2022    Paco Medrano  [address]  [place] [facility] [address]    Dear Paco Medrano,     At [facility] we care about your health and are committed to providing quality patient care.    Which includes staying current on preventive cancer screenings.  You can increase your chances of finding and treating cancers through regular screenings.      Our records indicate you may be due for the following preventive screening(s):  Breast Cancer Screening - Mammogram  Cervical Cancer Screening - PAP Needed  Depression  -  PHQ-9 Needed  Physical  Breast Cancer Screening - Mammogram  Cervical Cancer Screening - PAP Needed  Depression  -  PHQ-9 Needed  Physical  Immunizations  -  HPV, Pneumococcal and Zoster    To schedule an appointment or discuss this screening further, you may contact us by phone at the [facility] at [phone] or online through the patient portal/[employer] @ [email or web address]    If you have had any of the screenings listed above at another facility, please call us so that we may update your chart.      Your partners in health,      Quality Committee at [facility]

## 2022-07-05 NOTE — TELEPHONE ENCOUNTER
Patient Quality Outreach    Patient is due for the following:   Breast Cancer Screening - Mammogram  Cervical Cancer Screening - PAP Needed  Depression  -  PHQ-9 Needed  Physical  Breast Cancer Screening - Mammogram  Cervical Cancer Screening - PAP Needed  Depression  -  PHQ-9 Needed  Physical  Immunizations  -  HPV, Pneumococcal and Zoster    NEXT STEPS:   Schedule a yearly physical    Type of outreach:    Sent letter.

## 2022-07-07 ENCOUNTER — TELEPHONE (OUTPATIENT)
Dept: FAMILY MEDICINE | Facility: CLINIC | Age: 62
End: 2022-07-07

## 2022-07-07 DIAGNOSIS — F51.04 PSYCHOPHYSIOLOGICAL INSOMNIA: ICD-10-CM

## 2022-07-07 NOTE — TELEPHONE ENCOUNTER
Patient is requesting to be put back on 5mg of Ambien she states the controled release  isn't working for her

## 2022-07-12 DIAGNOSIS — F51.04 PSYCHOPHYSIOLOGICAL INSOMNIA: ICD-10-CM

## 2022-07-12 RX ORDER — ZOLPIDEM TARTRATE 5 MG/1
TABLET ORAL
Qty: 30 TABLET | Refills: 5 | Status: CANCELLED | OUTPATIENT
Start: 2022-07-12

## 2022-07-12 NOTE — TELEPHONE ENCOUNTER
Patient states that the prescription currently prescribed is not effective and would like to placed back on   TAKE 1/2 TO 1 TABLET(2.5 TO 5 MG) BY MOUTH EVERY NIGHT AT BEDTIME AS NEEDED FOR SLEEP   Sent to pharmacy as: Zolpidem Tartrate 5 MG Oral Tablet (NENA)     Dana Rodas RN, BSN   [facility]

## 2022-07-19 NOTE — TELEPHONE ENCOUNTER
Pharmacy is still faxing request for this change.  Please see message from 07/12/22 as wel1?      7/12/22 1:20 PM  Note  Patient states that the prescription currently prescribed is not effective and would like to placed back on   TAKE 1/2 TO 1 TABLET(2.5 TO 5 MG) BY MOUTH EVERY NIGHT AT BEDTIME AS NEEDED FOR SLEEP   Sent to pharmacy as: Zolpidem Tartrate 5 MG Oral Tablet (AMBIEN)      Dana Rodas RN, BSN   [facility]              .

## 2022-07-20 ENCOUNTER — TELEPHONE (OUTPATIENT)
Dept: FAMILY MEDICINE | Facility: CLINIC | Age: 62
End: 2022-07-20

## 2022-07-20 RX ORDER — ZOLPIDEM TARTRATE 5 MG/1
TABLET ORAL
Qty: 30 TABLET | Refills: 5 | Status: SHIPPED | OUTPATIENT
Start: 2022-07-20 | End: 2023-01-16

## 2022-07-20 NOTE — TELEPHONE ENCOUNTER
Plan does not cover zolpidem (AMBIEN) 5 MG tablet.  Please call [phone] to initiate Prior Auth or switch to alternative medication.    Insurance type and ID number: ID# [id number]      Additional Information: Please see previous messages on why patient wanted changed to this strength    Isabel Higgins

## 2022-07-20 NOTE — TELEPHONE ENCOUNTER
Called and spoke to the patient and let her know that her Rx was changed and sent to her pharmacy.  Elvi Meza MA  [facility]  2nd Floor  Primary Care

## 2022-07-21 NOTE — TELEPHONE ENCOUNTER
Prior Authorization Approval    Authorization Effective Date: 6/21/2022  Authorization Expiration Date: 7/21/2023  Medication: zolpidem (AMBIEN) 5 MG - APPROVED  Approved Dose/Quantity:    Reference #:     Insurance Company:    Expected CoPay:       CoPay Card Available:      Foundation Assistance Needed:    Which Pharmacy is filling the prescription (Not needed for infusion/clinic administered): "[facility] #[id number] - [facility] [address] [place] AT [facility] [address]  Pharmacy Notified: Yes  Patient Notified: Yes  **Instructed pharmacy to notify patient when script is ready to /ship.**  PATIENT PICKED UP - PHARMACY WILL REPROCESS - VM LEFT FOR PATIENT TO CONTACT PHARMACY FOR REFUND.

## 2022-07-21 NOTE — TELEPHONE ENCOUNTER
Central Prior Authorization Team   Phone: [phone]    PA Initiation    Medication: zolpidem (AMBIEN) 5 MG tablet  Insurance Company:    Pharmacy Filling the Rx: [facility] #[id number] [place] [address] [place] AT [facility]  [address]  Filling Pharmacy Phone: [phone]  Filling Pharmacy Fax: [phone]  Start Date: 7/21/2022

## 2022-09-13 ENCOUNTER — TELEPHONE (OUTPATIENT)
Dept: FAMILY MEDICINE | Facility: CLINIC | Age: 62
End: 2022-09-13

## 2022-09-13 NOTE — LETTER
[address] [address]  [phone]  Dept: [phone]    September 13, 2022    Paco Medrano  [address] [place]  [facility] [address]    Dear Paco Medrano,     At [facility] we care about your health and are committed to providing quality patient care.    Which includes staying current on preventive cancer screenings.  You can increase your chances of finding and treating cancers through regular screenings.      Our records indicate you may be due for the following preventive screening(s):    Mammogram    Mammogram for breast cancer   Recommended every 1-2 years for women age 50 and older  Mammograms help detect breast cancer, which is the most common cancer among women in the [place].  You may need to start having mammograms earlier and more often if you have had breast cancer, breast problems, or a family history of breast cancer.     Cervical Cancer Screening    Pap smear is a screening test used to detect cervical cancer. It is recommended every three years for women 21 and older. The test should be done at least once every three years but women who are at greater risk for cervical cancer may need to have the test more often.  Immunizations  Annual Wellness Visit    To schedule an appointment or discuss this screening further, you may contact us by phone at the [facility] at [phone] or online through the patient portal/[email or web address] @ [email or web address]    If you have had any of the screenings listed above at another facility, please call us so that we may update your chart.      Your partners in health,      Quality Committee at [facility]

## 2022-09-13 NOTE — TELEPHONE ENCOUNTER
Patient Quality Outreach    Patient is due for the following:   Breast Cancer Screening - Mammogram  Cervical Cancer Screening - PAP Needed  Physical Preventive Adult Physical      Topic Date Due     COVID-19 Vaccine (1) Never done     Pneumococcal Vaccine (1 - PCV) Never done     Zoster (Shingles) Vaccine (1 of 2) Never done     Flu Vaccine (1) Never done         Type of outreach:    Sent letter.    Next Steps:  Reach out within 90 days via Letter.    Max number of attempts reached: Yes. Will try again in 90 days if patient still on fail list.    Questions for provider review:    None     Maicol Conley MA  Chart routed to Provider.

## 2022-09-20 NOTE — TELEPHONE ENCOUNTER
This writer attempted to contact patient on 08/15/19      Reason for call medication and left detailed message.      If patient calls back:   1st floor [facility] [place] Care Team (MA/TC) called. Inform patient that someone from the team will contact them, document that pt called and route to care team.         Brandi Alvarenga MA   Detail Level: Zone Consent was obtained from the patient. The risks of squaric acid application were discussed in detail. Specifically, the risks of redness, itching, pain and allergic reactions were addressed. Prior to application all of the patient's questions were answered. Strength: Squaric Acid 2% Add 52 Modifier (Optional): no Post-Care Instructions: I reviewed with the patient in detail post-care instructions. The patient understands that the treated areas should be washed off 4 to 6 hours after application. They were instructed to call with any concerns.

## 2022-11-21 ENCOUNTER — MYC MEDICAL ADVICE (OUTPATIENT)
Dept: FAMILY MEDICINE | Facility: CLINIC | Age: 62
End: 2022-11-21

## 2022-12-13 ENCOUNTER — TELEPHONE (OUTPATIENT)
Dept: FAMILY MEDICINE | Facility: CLINIC | Age: 62
End: 2022-12-13

## 2022-12-13 NOTE — TELEPHONE ENCOUNTER
Patient Quality Outreach    Patient is due for the following:   Breast Cancer Screening - Mammogram    Next Steps:   Schedule a office visit for mammogram    Type of outreach:    Phone, left message for patient/parent to call back.    Next Steps:  Reach out within 90 days via [employer].    Max number of attempts reached: Yes. Will try again in 90 days if patient still on fail list.    Questions for provider review:    None     Leeroy Xavier

## 2022-12-22 ENCOUNTER — TELEPHONE (OUTPATIENT)
Dept: FAMILY MEDICINE | Facility: CLINIC | Age: 62
End: 2022-12-22

## 2022-12-22 DIAGNOSIS — F51.04 PSYCHOPHYSIOLOGICAL INSOMNIA: ICD-10-CM

## 2022-12-22 RX ORDER — ZOLPIDEM TARTRATE 5 MG/1
TABLET ORAL
Qty: 30 TABLET | Refills: 5 | Status: CANCELLED | OUTPATIENT
Start: 2022-12-22

## 2022-12-22 NOTE — TELEPHONE ENCOUNTER
Please notify the patient that I cannot send an updated prescription this early (if I send it now it will  before she can get the next fill, which is due around 23 because the last 6-month supply was written 22). Please have the patient request the refill around . (She won't run out before then unless she has been taking it incorrectly)

## 2022-12-23 NOTE — TELEPHONE ENCOUNTER
This writer attempted to contact patient on 12/23/22.    Reason for call to relay provider message below and left message.    If patient calls back please relay provider message below.      Vanessa MITCHELLN, RN

## 2022-12-26 NOTE — TELEPHONE ENCOUNTER
This writer attempted to contact patient on 12/26/22      Reason for call relay provider message and left message.      If patient calls back:   Registered Nurse called. Relay provider message re: medication below, ask patient to request refill around 1/15/23      STEPHEN ChinchillaN, RN  [facility] Primary Care [facility]

## 2022-12-27 NOTE — TELEPHONE ENCOUNTER
This writer attempted to contact patient on 12/27/22 (3rd attempt)        Reason for call relay provider message and left message.        If patient calls back:              Registered Nurse called. Relay provider message re: medication below, ask patient to request refill around 1/15/23        STEPHEN ChinchillaN, RN  [facility] Primary Care [facility]

## 2023-01-04 NOTE — TELEPHONE ENCOUNTER
Patient calling back, writer relayed provider message below re: needing to wait until 1/15/23 for prescription renewal request. Patient verbalized understanding, will request refill around that time. No further questions or concerns.        STEPHEN ChinchillaN, RN  [facility] Primary Care [facility]

## 2023-01-13 DIAGNOSIS — F51.04 PSYCHOPHYSIOLOGICAL INSOMNIA: ICD-10-CM

## 2023-01-13 NOTE — TELEPHONE ENCOUNTER
Medication Question or Refill    Contacts       Type Contact Phone/Fax    01/13/2023 12:44 PM CST Phone (Incoming) Paco Medrano (Self) [phone] (H)          What medication are you calling about (include dose and sig)?: zolpidem (AMBIEN) 5 MG tablet    Controlled Substance Agreement on file:   CSA -- Patient Level:    CSA: None found at the patient level.       Who prescribed the medication?: Dr Desai    Do you need a refill? Yes: Patient states that she is completely out    Patient offered an appointment? No    Do you have any questions or concerns?  Yes: Patient completely out    Preferred Pharmacy:    [id number] - [place], [place] - [address] [place] AT [facility] [address] [address] [place] [address]  Phone: [phone] Fax: [phone]    Elvi Meza [facility]  2nd Floor  Primary Care

## 2023-01-16 RX ORDER — ZOLPIDEM TARTRATE 5 MG/1
TABLET ORAL
Qty: 30 TABLET | Refills: 4 | Status: SHIPPED | OUTPATIENT
Start: 2023-01-16 | End: 2023-06-06

## 2023-01-26 DIAGNOSIS — J01.90 ACUTE SINUSITIS WITH SYMPTOMS > 10 DAYS: ICD-10-CM

## 2023-01-26 DIAGNOSIS — J32.9 RECURRENT SINUSITIS: ICD-10-CM

## 2023-01-27 RX ORDER — MOMETASONE FUROATE MONOHYDRATE 50 UG/1
SPRAY, METERED NASAL
Qty: 17 G | Refills: 5 | Status: SHIPPED | OUTPATIENT
Start: 2023-01-27 | End: 2024-09-03

## 2023-03-22 ENCOUNTER — TELEPHONE (OUTPATIENT)
Dept: FAMILY MEDICINE | Facility: CLINIC | Age: 63
End: 2023-03-22
Payer: COMMERCIAL

## 2023-03-22 NOTE — LETTER
March 22, 2023    Paco Medrano  [address] [place]  [facility] [address]    Dear Paco Medrano,     At [facility] we care about your health and are committed to providing quality patient care.    Which includes staying current on preventive cancer screenings.  You can increase your chances of finding and treating cancers through regular screenings.      Our records indicate you may be due for the following preventive screening(s):  Colon Cancer Screening  Breast Cancer Screening - Mammogram  Cervical Cancer Screening - PAP Needed  Physical Preventive Adult Physical      Topic Date Due     COVID-19 Vaccine (1) Never done     Pneumococcal Vaccine (1 - PCV) Never done     Zoster (Shingles) Vaccine (1 of 2) Never done     Flu Vaccine (1) Never done       To schedule an appointment or discuss this screening further, you may contact us by phone at the [facility] at [phone] or online through the patient portal/[facility] @ [email or web address]    If you have had any of the screenings listed above at another facility, please call us so that we may update your chart.      Your partners in health,      Quality Committee at [facility]

## 2023-03-22 NOTE — TELEPHONE ENCOUNTER
Patient Quality Outreach    Patient is due for the following:   Colon Cancer Screening  Breast Cancer Screening - Mammogram  Cervical Cancer Screening - PAP Needed  Physical Preventive Adult Physical      Topic Date Due     COVID-19 Vaccine (1) Never done     Pneumococcal Vaccine (1 - PCV) Never done     Zoster (Shingles) Vaccine (1 of 2) Never done     Flu Vaccine (1) Never done       Next Steps:   Schedule a Adult Preventative    Type of outreach:    Sent letter.      Questions for provider review:    None     Amarilys Vaughn MA

## 2023-03-29 ENCOUNTER — TELEPHONE (OUTPATIENT)
Dept: FAMILY MEDICINE | Facility: CLINIC | Age: 63
End: 2023-03-29
Payer: COMMERCIAL

## 2023-03-29 NOTE — TELEPHONE ENCOUNTER
Patient Quality Outreach    Patient is due for the following:   Colon Cancer Screening  Breast Cancer Screening - Mammogram  Cervical Cancer Screening - PAP Needed  Depression  -  PHQ-9 needed  Physical Preventive Adult Physical      Topic Date Due     COVID-19 Vaccine (1) Never done     Pneumococcal Vaccine (1 - PCV) Never done     Zoster (Shingles) Vaccine (1 of 2) Never done     Flu Vaccine (1) Never done       Next Steps:   Schedule a Adult Preventative    Type of outreach:    Phone, left message for patient/parent to call back. and Sent letter.      Questions for provider review:    None     Frank Wang MA

## 2023-03-29 NOTE — LETTER
Dear Paco,    At [facility] we care about your health and are committed to providing quality patient care.    You are in particular need of attention regarding:  -Depression  -Breast Cancer Screening  -Colon Cancer Screening  -Cervical Cancer Screening  -Wellness (Physical) Visit     We are recommending that you:    Schedule a MAMMOGRAM which is due.    1 in 8 women will develop invasive breast cancer during her lifetime and it is the most common non-skin cancer in [place] women.  EARLY detection, new treatments, and a better understanding of the disease have increased survival rates - the 5 year survival rate in the 1960s was 63% and today it is close to 90%.    If you are under/uninsured, we recommend you contact the [facility]. They offer mammograms at no charge or on a sliding fee charge. You can schedule with them at [phone]. Please have them send us the results.      Please disregard this reminder if you have had this exam elsewhere within the last year.  It would be helpful for us to have a copy of your mammogram report in your file so that we can best coordinate your care - please contact us with when your test was done so we can update your record.        Schedule a COLONOSCOPY to assess for colon cancer (due every 10 years or 5 years in higher risk situations.)       Colon cancer is now the second leading cause of cancer-related deaths in the [place] for both men and women and there are over 130,000 new cases and 50,000 deaths per year from colon cancer.  Colonoscopies can prevent 90-95% of these deaths.  Problem lesions can be removed before they ever become cancer.  This test is not only looking for cancer, but also getting rid of precancerious lesions.    If you are under/uninsured, we recommend you contact the [employer] [facility] program. [facility] is a free colorectal cancer screening program that provides colonoscopies for eligible under/uninsured [place]  men and women. If you are interested in receiving a free colonoscopy, please call [facility] at [phone] (mention code [facility]) to see if you re eligible.     If you do not wish to do a colonoscopy or cannot afford to do one, at this time, there is another option. It is called a FIT test or Fecal Immunochemical Occult Blood Test (take home stool sample kit).  It does not replace the colonoscopy for colorectal cancer screening, but it can detect hidden bleeding in the lower colon.  It does need to be repeated every year and if a positive result is obtained, you would be referred for a colonoscopy.    If you have completed either one of these tests at another facility, please call/respond to this message with the details of when and where the tests were done and if they were normal or not. Or have the records sent to our clinic so that we can best coordinate your care.        Schedule a PAP SMEAR EXAM which is due.  Please disregard this reminder if you have had this exam elsewhere within the last year.  It would be helpful for us to have a copy of your recent pap smear report in our file so that we can best coordinate your care.    If you are under/uninsured, we recommend you contact the [facility]. They offer pap smears at no charge or on a sliding fee charge. You can schedule with them at [phone]. Please have them send us the results.                Here is a list of Health Maintenance topics that are due now or due soon:  Health Maintenance Due   Topic Date Due     COVID-19 Vaccine (1) Never done     Pneumococcal Vaccine: Pediatrics (0 to 5 Years) and At-Risk Patients (6 to 64 Years) (1 - PCV) Never done     HIV SCREENING  Never done     ZOSTER IMMUNIZATION (1 of 2) Never done     LUNG CANCER SCREENING  Never done     YEARLY PREVENTIVE VISIT  08/25/2010     LIPID  06/16/2020     MAMMO SCREENING  09/21/2020     HPV TEST  12/10/2020     PAP  12/10/2020     PHQ-9  04/05/2021     ADVANCE CARE  PLANNING  07/19/2021     ANNUAL REVIEW OF HM ORDERS  06/15/2022     INFLUENZA VACCINE (1) Never done     COLORECTAL CANCER SCREENING  01/01/2023     NICOTINE/TOBACCO CESSATION COUNSELING Q 1 YR  03/04/2023        To schedule an appointment or discuss this further, you may contact us by phone at the [facility] at [phone] or online through the patient portal/[employer] @ [email or web address]    Thank you for trusting [facility] and we appreciate the opportunity to serve you.  We look forward to supporting your healthcare needs in the future.    Your partners in health,      Quality Committee at [facility] [place]

## 2023-05-25 DIAGNOSIS — F51.04 PSYCHOPHYSIOLOGICAL INSOMNIA: ICD-10-CM

## 2023-05-29 RX ORDER — ZOLPIDEM TARTRATE 5 MG/1
TABLET ORAL
Qty: 30 TABLET | OUTPATIENT
Start: 2023-05-29

## 2023-05-30 NOTE — TELEPHONE ENCOUNTER
Called and left a voicemail message regarding a darnell refill sent to the patient's pharmacy and to return our call to schedule a visit for further refills.  Elvi Meza [facility]  2nd Floor  Primary Care

## 2023-06-06 ENCOUNTER — VIRTUAL VISIT (OUTPATIENT)
Dept: FAMILY MEDICINE | Facility: CLINIC | Age: 63
End: 2023-06-06
Payer: COMMERCIAL

## 2023-06-06 DIAGNOSIS — Z12.31 VISIT FOR SCREENING MAMMOGRAM: ICD-10-CM

## 2023-06-06 DIAGNOSIS — F43.23 ADJUSTMENT DISORDER WITH MIXED ANXIETY AND DEPRESSED MOOD: ICD-10-CM

## 2023-06-06 DIAGNOSIS — F51.04 PSYCHOPHYSIOLOGICAL INSOMNIA: Primary | ICD-10-CM

## 2023-06-06 DIAGNOSIS — Z12.11 SCREEN FOR COLON CANCER: ICD-10-CM

## 2023-06-06 PROCEDURE — 99215 OFFICE O/P EST HI 40 MIN: CPT | Mod: 93 | Performed by: FAMILY MEDICINE

## 2023-06-06 PROCEDURE — 96127 BRIEF EMOTIONAL/BEHAV ASSMT: CPT | Mod: 93 | Performed by: FAMILY MEDICINE

## 2023-06-06 RX ORDER — ZOLPIDEM TARTRATE 5 MG/1
5 TABLET ORAL
Qty: 30 TABLET | Refills: 3 | Status: SHIPPED | OUTPATIENT
Start: 2023-06-06 | End: 2023-10-03

## 2023-06-06 RX ORDER — BUPROPION HYDROCHLORIDE 100 MG/1
100 TABLET, EXTENDED RELEASE ORAL EVERY MORNING
Qty: 90 TABLET | Refills: 0 | Status: SHIPPED | OUTPATIENT
Start: 2023-06-06 | End: 2024-04-11

## 2023-06-06 ASSESSMENT — PATIENT HEALTH QUESTIONNAIRE - PHQ9
SUM OF ALL RESPONSES TO PHQ QUESTIONS 1-9: 8
SUM OF ALL RESPONSES TO PHQ QUESTIONS 1-9: 8

## 2023-06-06 NOTE — PROGRESS NOTES
Paco is a 63 year old who is being evaluated via a billable telephone visit.      What phone number would you like to be contacted at? [phone]   How would you like to obtain your AVS? Mail a copy  Distant Location (provider location):  On-site    Assessment & Plan     (F51.04) Psychophysiological insomnia  (primary encounter diagnosis)  Comment: Her insomnia is worse due to worsening external stressors, as described below. Unfortunately much of our discussion centered on the fact that she did not feel that 5 mg of zolpidem was effective enough and that she should be able to take 10 mg, and my refusal to do so. I offered her the following options;, 1. Continue zolpidem at 5 mg, 2. Switch to a different sleeping pill (patient hesitant to do this because many or all of her choices would not be covered by insurance), 3. See a sleep specialist, 4. See a counselor for sleep therapy, 5. Take an additional medication to treat her underlying anxiety.  Plan: zolpidem (AMBIEN) 5 MG tablet, Adult Mental         Health  Referral        Eventually she states she is willing to do #1, #4, and reluctantly #5 (see below).    (F43.23) Adjustment disorder with mixed anxiety and depressed mood  Comment: As a pharmacy tech, she feels very familiar with multiple medications and has no interest in taking an selective SSRI, trazodone, mirtazapine, but is ok with bupropion especially since it had been suggested by her friends.  Plan: buPROPion (WELLBUTRIN SR) 100 MG 12 hr tablet,         Adult Mental Health  Referral        Return in about 3 months (around 9/6/2023) for full physical, recheck medications.      (Z12.31) Visit for screening mammogram  Comment:   Plan: MA SCREENING DIGITAL BILAT - Future  (s+30)            (Z12.11) Screen for colon cancer  Comment:   Plan: Colonoscopy Screening  Referral                49 minutes spent by me on the date of the encounter doing chart review, review of outside  "records, patient visit and documentation        Nicotine/Tobacco Cessation:  She reports that she has been smoking cigarettes. She started smoking about 11 years ago. She has never used smokeless tobacco.  Nicotine/Tobacco Cessation Plan:   Pharmacotherapies : bupropion (Zyban) (she is hoping her bupropion prescription will help with her smoking issue)      Rodney Desai MD  [facility] ULISES Antonio is a 63 year old, presenting for the following health issues:  Insomnia        6/6/2023     1:41 PM   Additional Questions   Roomed by Mendy     History of Present Illness       Reason for visit:  Insomnia    She eats 0-1 servings of fruits and vegetables daily.She consumes 0 sweetened beverage(s) daily.She exercises with enough effort to increase her heart rate 9 or less minutes per day.  She exercises with enough effort to increase her heart rate 3 or less days per week.   She is taking medications regularly.    Today's PHQ-9         PHQ-9 Total Score: 8    PHQ-9 Q9 Thoughts of better off dead/self-harm past 2 weeks :   Not at all         The patient reports a variety of psychosocial factors causing a significant amount of anxiety (family issues, house break-in, work stress, etc.) in her life recently. She notes that the 5mg zolpidem has done \"virtually nothing\" for her sleep and is requesting that the prescription be changed to 10 mg. She describes that she has been able to fall asleep but constantly wakes up a few hours later and is unable to fall asleep. She is also requesting to meet with a counselor to deal with her anxiety. She would not like to try alprazolam although she took it recently (given by a friend), would not like to try trazodone because she reports she is allergic, and would not like to try Mirtazapine because \"she knows what it does to people\". She is also hesitant, \"afraid to need\", to take any daily medication for her anxiety. She does not want take a " medication that would make her gain weight as well.          Review of Systems         Objective    Vitals - Patient Reported  Pain Score: No Pain (0)        Physical Exam   healthy, alert and no distress  PSYCH: Alert and oriented times 3; coherent speech, normal   rate and volume, able to articulate logical thoughts, able   to abstract reason, no tangential thoughts, no hallucinations   or delusions  Her affect is normal  RESP: No cough, no audible wheezing, able to talk in full sentences  Remainder of exam unable to be completed due to telephone visits                Phone call duration: 34 minutes    This document serves as a record of the services and decisions personally performed and made by Dr. Desai. It was created on his behalf by Mark Delarosa, a trained medical scribe. The creation of this document is based the provider's statements to the medical scribe.  Mark Delarosa,  6:20 PM

## 2023-06-28 ENCOUNTER — VIRTUAL VISIT (OUTPATIENT)
Dept: FAMILY MEDICINE | Facility: CLINIC | Age: 63
End: 2023-06-28
Payer: COMMERCIAL

## 2023-06-28 DIAGNOSIS — J01.01 ACUTE RECURRENT MAXILLARY SINUSITIS: Primary | ICD-10-CM

## 2023-06-28 PROCEDURE — 99213 OFFICE O/P EST LOW 20 MIN: CPT | Mod: 95 | Performed by: PHYSICIAN ASSISTANT

## 2023-06-28 RX ORDER — AMOXICILLIN 500 MG/1
500 CAPSULE ORAL 2 TIMES DAILY
Qty: 14 CAPSULE | Refills: 0 | Status: SHIPPED | OUTPATIENT
Start: 2023-06-28 | End: 2023-07-05

## 2023-06-28 RX ORDER — PSEUDOEPHEDRINE HCL 120 MG/1
120 TABLET, FILM COATED, EXTENDED RELEASE ORAL EVERY 12 HOURS
Qty: 28 TABLET | Refills: 0 | Status: SHIPPED | OUTPATIENT
Start: 2023-06-28 | End: 2024-04-11

## 2023-06-28 RX ORDER — GUAIFENESIN AND DEXTROMETHORPHAN HYDROBROMIDE 1200; 60 MG/1; MG/1
1 TABLET, EXTENDED RELEASE ORAL 2 TIMES DAILY
Qty: 28 TABLET | Refills: 0 | Status: SHIPPED | OUTPATIENT
Start: 2023-06-28 | End: 2024-04-11

## 2023-06-28 NOTE — PROGRESS NOTES
"Paco is a 63 year old who is being evaluated via a billable video visit.      How would you like to obtain your AVS? {AVS Preference:794675}  If the video visit is dropped, the invitation should be resent by: {video visit invitation (Optional) :187896}  Will anyone else be joining your video visit? {:866000}  {If patient encounters technical issues they should call [phone] :150941}    {PROVIDER CHARTING PREFERENCE:977137}    Subjective   Paco is a 63 year old, presenting for the following health issues:  No chief complaint on file.    HPI     {SUPERLIST (Optional):377336}  {additonal problems for provider to add (Optional):819040}    Review of Systems   {ROS COMP (Optional):203969}      Objective         Vitals:  No vitals were obtained today due to virtual visit.    Physical Exam   {video visit exam brief selected:475591::\"GENERAL: Healthy, alert and no distress\",\"EYES: Eyes grossly normal to inspection.  No discharge or erythema, or obvious scleral/conjunctival abnormalities.\",\"RESP: No audible wheeze, cough, or visible cyanosis.  No visible retractions or increased work of breathing.  \",\"SKIN: Visible skin clear. No significant rash, abnormal pigmentation or lesions.\",\"NEURO: Cranial nerves grossly intact.  Mentation and speech appropriate for age.\",\"PSYCH: Mentation appears normal, affect normal/bright, judgement and insight intact, normal speech and appearance well-groomed.\"}    {Diagnostic Test Results (Optional):165979}    {AMBULATORY ATTESTATION (Optional):244604}        Video-Visit Details    Type of service:  Video Visit     Originating Location (pt. Location): {video visit patient location:337878::\"Home\"}  {PROVIDER LOCATION On-site should be selected for visits conducted from your clinic location or adjoining [facility] hospital, academic office, or other nearby [facility] building. Off-site should be selected for all other provider locations, including home:178334}  Distant Location (provider location):  " "{[facility] location provider:837659}  Platform used for Video Visit: {Virtual Visit Platforms:444259::\"[facility]\"}    "

## 2023-06-28 NOTE — PROGRESS NOTES
Paco is a 63 year old who is being evaluated via a billable telephone visit.      What phone number would you like to be contacted at? [phone]  How would you like to obtain your AVS? Lashanda  Distant Location (provider location):  Off-site    Assessment & Plan   Problem List Items Addressed This Visit    None  Visit Diagnoses     Acute recurrent maxillary sinusitis    -  Primary    Relevant Medications    amoxicillin (AMOXIL) 500 MG capsule    Dextromethorphan-Guaifenesin  MG TB12    pseudoePHEDrine (SUDAFED) 120 MG 12 hr tablet           amoxil 500 mg twice a day for 7 days  Mucinex dm   Sudafed   Increase fluids  Ibuprofen  as needed         8 minutes spent by me on the date of the encounter doing chart review, history and exam, documentation and further activities per the note           Princess Thornton PA-C  [facility]    Da Antonio is a 63 year old, presenting for the following health issues:  Sinus Problem        6/28/2023    10:57 AM   Additional Questions   Roomed by [facility]     Acute Illness  Acute illness concerns: Potential sinus infection. Patient reports that she used to get a lot of sinus infections in the past and knows when she is developing one   Onset/Duration: 4-5 days  Symptoms:  Fever: YES- felt feverish, not measured  Chills/Sweats: YES  Headache (location?): YES- pressure behind eyes  Sinus Pressure: YES  Conjunctivitis:  No  Ear Pain: Notes itchiness  Rhinorrhea: No  Congestion: YES  Sore Throat: No  Cough: no  Wheeze: No  Decreased Appetite: No  Nausea: No  Vomiting: No  Diarrhea: No  Dysuria/Freq.: No  Dysuria or Hematuria: No  Fatigue/Achiness: YES- in shoulders and neck area  Sick/Strep Exposure: YES- coworker with bronchitis last week  Therapies tried and outcome: Advil Cold and Sinus, Claritin-D, Nasonex with some temporary relief      Review of Systems   Constitutional, HEENT, cardiovascular, pulmonary, gi and gu systems  are negative, except as otherwise noted.      Objective         Vitals:  No vitals were obtained today due to virtual visit.    Physical Exam   healthy, alert and no distress  PSYCH: Alert and oriented times 3; coherent speech, normal   rate and volume, able to articulate logical thoughts, able   to abstract reason, no tangential thoughts, no hallucinations   or delusions  Her affect is normal  RESP: No cough, no audible wheezing, able to talk in full sentences  Remainder of exam unable to be completed due to telephone visits                Phone call duration: 8 minutes

## 2023-06-28 NOTE — PATIENT INSTRUCTIONS
At [facility], we strive to deliver an exceptional experience to you, every time we see you. If you receive a survey, please complete it as we do value your feedback.  If you have MyChart, you can expect to receive results automatically within 24 hours of their completion.  Your provider will send a note interpreting your results as well.   If you do not have MyChart, you should receive your results in about a week by mail.    Your care team:                            Family Medicine Internal Medicine   MD Colton Brito MD Shantel Branch-Fleming, MD Srinivasa Vaka, MD Katya Belousova, PASHUBHAM Root CNP, MD ([facility]) Pediatrics   Eagle Stockton, MD Sue See MD Amelia Massimini APRN GUALBERTO Taylor APRN MD Jeana Donaldson MD          Clinic hours: Monday - Thursday 7 am-6 pm; Fridays 7 am-5 pm.   Urgent care: Monday - Friday 10 am- 8 pm; Saturday and Sunday 9 am-5 pm.    Clinic: [phone]       [facility]: Monday - Thursday 8 am - 7 pm; Friday 8 am - 6 pm  [facility]: [phone]

## 2023-07-27 ENCOUNTER — TELEPHONE (OUTPATIENT)
Dept: FAMILY MEDICINE | Facility: CLINIC | Age: 63
End: 2023-07-27
Payer: COMMERCIAL

## 2023-07-27 NOTE — LETTER
July 27, 2023    Paco Medrano  [address]  [facility] [address]    Dear Paco    At [facility] [place] we care about your health and are committed to providing quality patient care.     Here is a list of Health Maintenance topics that are due now or due soon:  Health Maintenance Due   Topic Date Due    COVID-19 Vaccine (1) Never done    Pneumococcal Vaccine: Pediatrics (0 to 5 Years) and At-Risk Patients (6 to 64 Years) (1 - PCV) Never done    HIV SCREENING  Never done    ZOSTER IMMUNIZATION (1 of 2) Never done    LUNG CANCER SCREENING  Never done    YEARLY PREVENTIVE VISIT  08/25/2010    LIPID  06/16/2020    MAMMO SCREENING  09/21/2020    HPV TEST  12/10/2020    PAP  12/10/2020    ADVANCE CARE PLANNING  07/19/2021    COLORECTAL CANCER SCREENING  01/01/2023        We are recommending that you:  Schedule a WELLNESS (Preventative/Physical) APPOINTMENT with your primary care provider. If you go elsewhere for your wellness appointments then please disregard this reminder    ,   Schedule a PAP SMEAR EXAM which is due.  Please disregard this reminder if you have had this exam elsewhere within the last year.  It would be helpful for us to have a copy of your recent pap smear report in our file so that we can best coordinate your care.    If you are under/uninsured, we recommend you contact the [facility]. They offer pap smears at no charge or on a sliding fee charge. You can schedule with them at [phone]. Please have them send us the results.    , and   Schedule a Nurse-Only appointment to update your immunizations: Your records indicate that you are not up to date with your immunizations, please schedule a nurse-only appointment to get these updated or update them at your next office visit. If this is incorrect, please disregard.    To schedule an appointment or discuss this further, you may contact us by phone at the [facility] at [phone] or online  through the [email or web address] @ [email or web address]    Thank you for trusting [facility] and we appreciate the opportunity to serve you.  We look forward to supporting your healthcare needs in the future.    Your partners in health,      Quality Committee at [facility]

## 2023-07-27 NOTE — TELEPHONE ENCOUNTER
Patient Quality Outreach    Patient is due for the following:   Colon Cancer Screening  Cervical Cancer Screening - PAP Needed  Physical Preventive Adult Physical      Topic Date Due    COVID-19 Vaccine (1) Never done    Pneumococcal Vaccine (1 - PCV) Never done    Zoster (Shingles) Vaccine (1 of 2) Never done       Next Steps:   Schedule a Adult Preventative    Type of outreach:    Sent letter.      Questions for provider review:    None           Frank Wang MA

## 2023-09-08 NOTE — TELEPHONE ENCOUNTER
Requested Prescriptions   Pending Prescriptions Disp Refills     zolpidem (AMBIEN) 5 MG tablet            Last Written Prescription Date:  1/11/19  Last Fill Quantity: 30,   # refills: 5  Last Office Visit: 3/8/19  Future Office visit:       Routing refill request to provider for review/approval because:  Drug not on the [facility], [facility] or [facility] refill protocol or controlled substance   30 tablet 5     Sig: Take 0.5-1 tablets (2.5-5 mg) by mouth nightly as needed for sleep (take right at bedtime)       There is no refill protocol information for this order              Irwin Faarax  [facility]   19:27

## 2023-09-20 DIAGNOSIS — F51.04 PSYCHOPHYSIOLOGICAL INSOMNIA: ICD-10-CM

## 2023-09-25 DIAGNOSIS — F51.04 PSYCHOPHYSIOLOGICAL INSOMNIA: ICD-10-CM

## 2023-09-29 RX ORDER — ZOLPIDEM TARTRATE 5 MG/1
5 TABLET ORAL AT BEDTIME
Qty: 30 TABLET | OUTPATIENT
Start: 2023-09-29

## 2023-10-03 RX ORDER — ZOLPIDEM TARTRATE 5 MG/1
5 TABLET ORAL
Qty: 30 TABLET | Refills: 3 | Status: SHIPPED | OUTPATIENT
Start: 2023-10-03 | End: 2024-01-24

## 2024-01-22 DIAGNOSIS — F51.04 PSYCHOPHYSIOLOGICAL INSOMNIA: ICD-10-CM

## 2024-01-24 RX ORDER — ZOLPIDEM TARTRATE 5 MG/1
TABLET ORAL
Qty: 30 TABLET | Refills: 0 | Status: SHIPPED | OUTPATIENT
Start: 2024-01-24 | End: 2024-04-11

## 2024-01-24 NOTE — TELEPHONE ENCOUNTER
January 24, 2024  Rodney Desai MD   to [facility]   DEBBY      1/24/24  8:59 AM  Let patient know she needs to be seen in the office for further refills after this.  (Last seen in the office June 2021, except for Urgent Care in 2022)    Called and left a vm message that her provider sent a refill on her medication and that the patient will need to be seen in clinic before the next refill is needed as we have not seen her for over 2 years.  Elvi Meza [facility]   Primary Care

## 2024-02-15 ENCOUNTER — TELEPHONE (OUTPATIENT)
Dept: FAMILY MEDICINE | Facility: CLINIC | Age: 64
End: 2024-02-15
Payer: COMMERCIAL

## 2024-02-15 NOTE — TELEPHONE ENCOUNTER
Patient Quality Outreach    Patient is due for the following:   Diabetes -  LDL (Fasting)  Colon Cancer Screening  Breast Cancer Screening - Mammogram  Cervical Cancer Screening - PAP Needed  Depression  -  PHQ-9 needed  Physical Preventive Adult Physical    Next Steps:   Schedule a Adult Preventative    Type of outreach:    Sent letter.     Next Steps:  Reach out within 90 days via Letter.    Max number of attempts reached: No. Will try again in 90 days if patient still on fail list.    Questions for provider review:    None           Meagan Mcclure CNA

## 2024-02-15 NOTE — LETTER
February 15, 2024    Paco Medrano  [address]  [place] [address]            Dear Paco,    At [facility] we care about your health and are committed to providing quality patient care.     Here is a list of Health Maintenance topics that are due now or due soon:  Health Maintenance Due   Topic Date Due    COVID-19 Vaccine (1) Never done    Pneumococcal Vaccine: Pediatrics (0 to 5 Years) and At-Risk Patients (6 to 64 Years) (1 of 2 - PCV) Never done    HIV SCREENING  Never done    ZOSTER IMMUNIZATION (1 of 2) Never done    LUNG CANCER SCREENING  Never done    YEARLY PREVENTIVE VISIT  08/25/2010    GLUCOSE  06/16/2018    RSV VACCINE (Pregnancy & 60+) (1 - 1-dose 60+ series) Never done    LIPID  06/16/2020    MAMMO SCREENING  09/21/2020    HPV TEST  12/10/2020    PAP  12/10/2020    ADVANCE CARE PLANNING  07/19/2021    COLORECTAL CANCER SCREENING  01/01/2023    INFLUENZA VACCINE (1) Never done    PHQ-9  12/06/2023        We are recommending that you:  Schedule a WELLNESS (Preventative/Physical) APPOINTMENT with your primary care provider. If you go elsewhere for your wellness appointments then please disregard this reminder    ,   Schedule a MAMMOGRAM which is due.    1 in 8 women will develop invasive breast cancer during her lifetime and it is the most common non-skin cancer in American women.  EARLY detection, new treatments, and a better understanding of the disease have increased survival rates - the 5 year survival rate in the 1960s was 63% and today it is close to 90%.    If you are under/uninsured, we recommend you contact the [facility]. They offer mammograms at no charge or on a sliding fee charge. You can schedule with them at [phone]. Please have them send us the results.      Please disregard this reminder if you have had this exam elsewhere within the last year.  It would be helpful for us to have a copy of your mammogram report in your  file so that we can best coordinate your care - please contact us with when your test was done so we can update your record.    ,   Schedule a COLONOSCOPY to assess for colon cancer (due every 10 years or 5 years in higher risk situations.)       Colon cancer is now the second leading cause of cancer-related deaths in the [place] for both men and women and there are over 130,000 new cases and 50,000 deaths per year from colon cancer.  Colonoscopies can prevent 90-95% of these deaths.  Problem lesions can be removed before they ever become cancer.  This test is not only looking for cancer, but also getting rid of precancerious lesions.    If you are under/uninsured, we recommend you contact the [employer] program. [employer] is a free colorectal cancer screening program that provides colonoscopies for eligible under/uninsured [place] men and women. If you are interested in receiving a free colonoscopy, please call [facility] at [phone] (mention code [employer]) to see if you re eligible.     If you do not wish to do a colonoscopy or cannot afford to do one, at this time, there is another option. It is called a FIT test or Fecal Immunochemical Occult Blood Test (take home stool sample kit).  It does not replace the colonoscopy for colorectal cancer screening, but it can detect hidden bleeding in the lower colon.  It does need to be repeated every year and if a positive result is obtained, you would be referred for a colonoscopy.    If you have completed either one of these tests at another facility, please call/respond to this message with the details of when and where the tests were done and if they were normal or not. Or have the records sent to our clinic so that we can best coordinate your care.,    Schedule a Diabetes Follow-Up Office Appointment: You are due to be seen with your primary care provider for a diabetes follow up office appointment. Please schedule this as soon as you are able.    ,  and   Complete the attached Questionnaire:  You are due to complete the attached PHQ questionnaire. Please complete as soon as you are able.    To schedule an appointment or discuss this further, you may contact us by phone at the [facility] at [phone] or online through the patient portal/[employer] @ [email or web address]    Thank you for trusting [facility] and we appreciate the opportunity to serve you.  We look forward to supporting your healthcare needs in the future.    Your partners in health,      Quality Committee at [facility]

## 2024-02-19 NOTE — TELEPHONE ENCOUNTER
Patient is scheduled for Preventive Health Screening VIP Mammogram        Outreach ,        Mel Avelar      
Bed/Stretcher in lowest position, wheels locked, appropriate side rails in place/Call bell, personal items and telephone in reach/Instruct patient to call for assistance before getting out of bed/chair/stretcher/Non-slip footwear applied when patient is off stretcher/[place] to call system/Physically safe environment - no spills, clutter or unnecessary equipment/Purposeful proactive rounding/Room/bathroom lighting operational, light cord in reach

## 2024-03-13 DIAGNOSIS — F51.04 PSYCHOPHYSIOLOGICAL INSOMNIA: ICD-10-CM

## 2024-03-18 RX ORDER — ZOLPIDEM TARTRATE 10 MG/1
TABLET ORAL
Qty: 15 TABLET | OUTPATIENT
Start: 2024-03-18

## 2024-04-04 ENCOUNTER — TELEPHONE (OUTPATIENT)
Dept: FAMILY MEDICINE | Facility: CLINIC | Age: 64
End: 2024-04-04

## 2024-04-04 NOTE — TELEPHONE ENCOUNTER
Called patient and LVM informing the virtual appointment for today 04/04/2024 needs to be rescheduled. Advised patient to call back and schedule in person office visit.     Leeroy Xavier

## 2024-04-04 NOTE — TELEPHONE ENCOUNTER
Patient called back and cancelled virtual appointment and scheduled in office visit on 04/11/2024.    Leeroy Xavier

## 2024-04-11 ENCOUNTER — OFFICE VISIT (OUTPATIENT)
Dept: FAMILY MEDICINE | Facility: CLINIC | Age: 64
End: 2024-04-11
Payer: COMMERCIAL

## 2024-04-11 VITALS
OXYGEN SATURATION: 95 % | BODY MASS INDEX: 26.31 KG/M2 | SYSTOLIC BLOOD PRESSURE: 129 MMHG | DIASTOLIC BLOOD PRESSURE: 85 MMHG | RESPIRATION RATE: 18 BRPM | WEIGHT: 143 LBS | TEMPERATURE: 97.2 F | HEIGHT: 62 IN | HEART RATE: 103 BPM

## 2024-04-11 DIAGNOSIS — Z11.4 SCREENING FOR HIV (HUMAN IMMUNODEFICIENCY VIRUS): ICD-10-CM

## 2024-04-11 DIAGNOSIS — Z12.11 SCREEN FOR COLON CANCER: ICD-10-CM

## 2024-04-11 DIAGNOSIS — Z12.4 CERVICAL CANCER SCREENING: ICD-10-CM

## 2024-04-11 DIAGNOSIS — Z28.21 VACCINATION NOT CARRIED OUT BECAUSE OF PATIENT REFUSAL: ICD-10-CM

## 2024-04-11 DIAGNOSIS — E78.5 HYPERLIPIDEMIA LDL GOAL <130: ICD-10-CM

## 2024-04-11 DIAGNOSIS — F41.9 ANXIETY: ICD-10-CM

## 2024-04-11 DIAGNOSIS — F33.0 MAJOR DEPRESSIVE DISORDER, RECURRENT EPISODE, MILD (H): ICD-10-CM

## 2024-04-11 DIAGNOSIS — F51.04 PSYCHOPHYSIOLOGICAL INSOMNIA: Primary | ICD-10-CM

## 2024-04-11 DIAGNOSIS — Z13.1 SCREENING FOR DIABETES MELLITUS: ICD-10-CM

## 2024-04-11 DIAGNOSIS — Z13.220 SCREENING FOR LIPID DISORDERS: ICD-10-CM

## 2024-04-11 LAB
ERYTHROCYTE [DISTWIDTH] IN BLOOD BY AUTOMATED COUNT: 13.8 % (ref 10–15)
HCT VFR BLD AUTO: 41 % (ref 35–47)
HGB BLD-MCNC: 13.6 G/DL (ref 11.7–15.7)
MCH RBC QN AUTO: 32.4 PG (ref 26.5–33)
MCHC RBC AUTO-ENTMCNC: 33.2 G/DL (ref 31.5–36.5)
MCV RBC AUTO: 98 FL (ref 78–100)
PLATELET # BLD AUTO: 294 10E3/UL (ref 150–450)
RBC # BLD AUTO: 4.2 10E6/UL (ref 3.8–5.2)
WBC # BLD AUTO: 6.6 10E3/UL (ref 4–11)

## 2024-04-11 PROCEDURE — 36415 COLL VENOUS BLD VENIPUNCTURE: CPT | Performed by: FAMILY MEDICINE

## 2024-04-11 PROCEDURE — 84443 ASSAY THYROID STIM HORMONE: CPT | Performed by: FAMILY MEDICINE

## 2024-04-11 PROCEDURE — 83550 IRON BINDING TEST: CPT | Performed by: FAMILY MEDICINE

## 2024-04-11 PROCEDURE — 83540 ASSAY OF IRON: CPT | Performed by: FAMILY MEDICINE

## 2024-04-11 PROCEDURE — 85027 COMPLETE CBC AUTOMATED: CPT | Performed by: FAMILY MEDICINE

## 2024-04-11 PROCEDURE — 99214 OFFICE O/P EST MOD 30 MIN: CPT | Performed by: FAMILY MEDICINE

## 2024-04-11 PROCEDURE — 80061 LIPID PANEL: CPT | Performed by: FAMILY MEDICINE

## 2024-04-11 PROCEDURE — 82947 ASSAY GLUCOSE BLOOD QUANT: CPT | Performed by: FAMILY MEDICINE

## 2024-04-11 PROCEDURE — 87389 HIV-1 AG W/HIV-1&-2 AB AG IA: CPT | Performed by: FAMILY MEDICINE

## 2024-04-11 RX ORDER — ZOLPIDEM TARTRATE 5 MG/1
5 TABLET ORAL
Qty: 30 TABLET | Refills: 5 | Status: SHIPPED | OUTPATIENT
Start: 2024-04-11 | End: 2024-10-03

## 2024-04-11 ASSESSMENT — ANXIETY QUESTIONNAIRES
IF YOU CHECKED OFF ANY PROBLEMS ON THIS QUESTIONNAIRE, HOW DIFFICULT HAVE THESE PROBLEMS MADE IT FOR YOU TO DO YOUR WORK, TAKE CARE OF THINGS AT HOME, OR GET ALONG WITH OTHER PEOPLE: NOT DIFFICULT AT ALL
7. FEELING AFRAID AS IF SOMETHING AWFUL MIGHT HAPPEN: SEVERAL DAYS
2. NOT BEING ABLE TO STOP OR CONTROL WORRYING: SEVERAL DAYS
1. FEELING NERVOUS, ANXIOUS, OR ON EDGE: SEVERAL DAYS
4. TROUBLE RELAXING: SEVERAL DAYS
7. FEELING AFRAID AS IF SOMETHING AWFUL MIGHT HAPPEN: SEVERAL DAYS
8. IF YOU CHECKED OFF ANY PROBLEMS, HOW DIFFICULT HAVE THESE MADE IT FOR YOU TO DO YOUR WORK, TAKE CARE OF THINGS AT HOME, OR GET ALONG WITH OTHER PEOPLE?: NOT DIFFICULT AT ALL
5. BEING SO RESTLESS THAT IT IS HARD TO SIT STILL: SEVERAL DAYS
3. WORRYING TOO MUCH ABOUT DIFFERENT THINGS: SEVERAL DAYS
GAD7 TOTAL SCORE: 6
6. BECOMING EASILY ANNOYED OR IRRITABLE: NOT AT ALL

## 2024-04-11 ASSESSMENT — PAIN SCALES - GENERAL: PAINLEVEL: NO PAIN (0)

## 2024-04-11 ASSESSMENT — PATIENT HEALTH QUESTIONNAIRE - PHQ9
SUM OF ALL RESPONSES TO PHQ QUESTIONS 1-9: 4
10. IF YOU CHECKED OFF ANY PROBLEMS, HOW DIFFICULT HAVE THESE PROBLEMS MADE IT FOR YOU TO DO YOUR WORK, TAKE CARE OF THINGS AT HOME, OR GET ALONG WITH OTHER PEOPLE: NOT DIFFICULT AT ALL
SUM OF ALL RESPONSES TO PHQ QUESTIONS 1-9: 4

## 2024-04-11 NOTE — PROGRESS NOTES
Assessment & Plan     (F51.04) Psychophysiological insomnia  (primary encounter diagnosis)  Comment: The patient has gone without her sleeping medication for a couple months and has not been well without it. Ii do not mind her taling zolpidem on a regular basis as long as she has regular follow up with us.   Plan: TSH with free T4 reflex, CBC with platelets,         Iron & Iron Binding Capacity, zolpidem (AMBIEN)        5 MG tablet        Return in about 1 month (around 5/11/2024) for full physical and Pap smear.      (F33.0) Major depressive disorder, recurrent episode, mild (H24)  (F41.9) Anxiety  Comment: These are relatively well controlled despite not taking medication (never took bupropion).   Plan: Monitor periodically.     (Z11.4) Screening for HIV (human immunodeficiency virus)  Comment: indications for screening discussed with the patient   Plan: HIV Antigen Antibody Combo            (Z12.11) Screen for colon cancer  Comment: Normal colonoscopy 10 years ago. She does not want to do another colonoscopy.   Plan: Fecal colorectal cancer screen FIT            (Z13.220) Screening for lipid disorders  Comment:   Plan: Lipid panel reflex to direct LDL Non-fasting            (Z13.1) Screening for diabetes mellitus  Comment:   Plan: Glucose            (Z12.4) Cervical cancer screening  Comment: Overdue.   Plan: Patient will schedule for Pap in the near future, possibly with a female provider.     (Z28.21) Vaccination not carried out because of patient refusal  Comment: Patient does not receive vaccines.   Plan:           Da Antonio is a 64 year old, presenting for the following health issues:  Depression        4/11/2024     1:29 PM   Additional Questions   Roomed by Frank   Accompanied by Self     Via the Health Maintenance questionnaire, the patient has reported the following services have been completed -Mammogram, this information has been sent to the abstraction team.  History of Present Illness  "      Reason for visit:  Having trouble sleeping    She eats 0-1 servings of fruits and vegetables daily.She consumes 2 sweetened beverage(s) daily.She exercises with enough effort to increase her heart rate 9 or less minutes per day.  She exercises with enough effort to increase her heart rate 3 or less days per week.   She is taking medications regularly.       Insomnia  Onset/Duration: about 2 months  Description:   Frequency of insomnia:  nightly  Time to fall asleep (sleep latency): hours  Middle of night awakening:  YES  Early morning awakening:  YES  Progression of Symptoms:  worsening  Accompanying Signs & Symptoms:  Daytime sleepiness/napping: No  Excessive snoring/apnea: No  Restless legs: No  Waking to urinate: No  Chronic pain:  No  Depression symptoms (if yes, do PHQ9): YES  Anxiety symptoms (if yes, do WILVER-7): YES  History:  Prior Insomnia: YES  New stressful situation: No  Precipitating factors:   Caffeine intake: No  OTC decongestants: Yes   Any new medications: No  Alleviating factors:  Self medicating (alcohol, etc.):  No  Stress-reduction (exercise, yoga, meditation etc): No  Therapies tried and outcome: Advil Cold & Sinus pm -  not effective, Benadryl -  not effective, and Pepcid    Patient reports she tried getting off zolpidem for about 2 months and notes she had hard time falling asleep and has been getting maximum 3-4 hours of sleep. She tried benadryl with no improvement. She wants to restart zolpidem.     Review of Systems       Objective    /85   Pulse 103   Temp 97.2  F (36.2  C) (Tympanic)   Resp 18   Ht 1.568 m (5' 1.75\")   Wt 64.9 kg (143 lb)   LMP  (LMP Unknown)   SpO2 95%   BMI 26.37 kg/m    Body mass index is 26.37 kg/m .  Physical Exam   GENERAL: healthy, alert and no distress  EYES: Eyes grossly normal to inspection, PERRL, EOMI, sclerae white and conjunctivae normal  MS: no gross musculoskeletal defects noted, no edema  SKIN: no suspicious lesions or rashes to " visible skin  NEURO: Normal strength and tone, sensory exam grossly normal, mentation intact, oriented times 3 and cranial nerves 2-12 intact  PSYCH: mentation appears normal, affect normal/bright               Signed Electronically by: Rodney Desai MD        The information in this document, created by the medical scribe for me, accurately reflects the services I personally performed and the decisions made by me. I have reviewed and approved this document for accuracy prior to signature.  Fabiola French

## 2024-04-11 NOTE — PATIENT INSTRUCTIONS
At [facility], we strive to deliver an exceptional experience to you, every time we see you. If you receive a survey, please complete it as we do value your feedback.  If you have MyChart, you can expect to receive results automatically within 24 hours of their completion.  Your provider will send a note interpreting your results as well.   If you do not have MyChart, you should receive your results in about a week by mail.    Your care team:                            Family Medicine Internal Medicine   MD Colton Brito, MD Rosalia Salas, MD Heide Barahona, PA-C    Rome Leigh, MD Pediatrics   Eagle Stockton, PA-C  Hellen Brantley, MD Marcy Weber APRSHUBHAM Brower CNP, CNP, MD Jeana Coon, MD Arlet Shook, CNP  Same-Day (No follow up visit)   Frank Pichardo, GUALBERTO Gonzalez, PA-C    Katie Upton PA-C     Clinic hours: Monday - Thursday 7 am-6 pm; Fridays 7 am-5 pm.   Urgent care: Monday - Friday 10 am- 8 pm; Saturday and Sunday 9 am-5 pm.    Clinic: [phone]       [facility]: Monday - Thursday 8 am - 7 pm; Friday 8 am - 6 pm  [facility]: [phone]

## 2024-04-11 NOTE — LETTER
"April 23, 2024      Paco CHRIS Mitchell  [address] [place] [address]        Dear ,    We are writing to inform you of your test results.    Your LDL (\"bad cholesterol\") was above goal.  Genetics, diet, weight and low exercise levels can contribute to this.    Your HDL (\"good cholesterol\") was at goal (>45 in men or > 55 in women).    Your triglyceride level was above normal.  Poor diet, genetics and being overweight can contribute to this.      Elevated LDL cholesterol and triglycerides, as well as low HDL cholesterol, increase a person's risk for heart and vascular disease. In your case, your 10-year risk of having a heart attack or other cardiovascular event is about 10.1% (see data listed below), according to the Pooled Cohort Equations (AHA/ACC 2013 Blood Cholesterol Guideline). Keep in mind that a normal 10-year risk is less than 7.5%. I suggest that you start a generic medication (rosuvastatin or something similar) to help lower this.  I will send a prescription to your pharmacy.  You should return in 3-4 months for a follow-up fasting blood test to see how well your cholesterol levels have improved.  Call sooner if you develop severe muscle aches, nausea, or very dark urine.     The following can all contribute to healthier cholesterol levels: maintaining a healthy diet with lean proteins, whole grains, and healthy fats such as olive oil, canola oil, and fish oil; regular exercise; maintaining an appropriate weight.     All of the rest of your test results were within the expected range for you (no medical condition to cause or worsen your insomnia).     Resulted Orders   HIV Antigen Antibody Combo   Result Value Ref Range    HIV Antigen Antibody Combo Nonreactive Nonreactive      Comment:      Negative HIV-1 p24 antigen and HIV-1/2 antibody screening test results usually indicate the absence of HIV-1 and HIV-2 infection. However, such negative results do not rule-out acute HIV " infection.  If acute HIV-1 or HIV-2 infection is suspected, detection of HIV-1 or HIV-2 RNA  is recommended.    Glucose   Result Value Ref Range    Glucose 94 70 - 99 mg/dL    Patient Fasting > 8hrs? No    Lipid panel reflex to direct LDL Non-fasting   Result Value Ref Range    Cholesterol 258 (H) <200 mg/dL    Triglycerides 194 (H) <150 mg/dL    Direct Measure HDL 66 >=50 mg/dL    LDL Cholesterol Calculated 153 (H) <=100 mg/dL    Non HDL Cholesterol 192 (H) <130 mg/dL    Patient Fasting > 8hrs? No     Narrative    Cholesterol  Desirable:  <200 mg/dL    Triglycerides  Normal:  Less than 150 mg/dL  Borderline High:  150-199 mg/dL  High:  200-499 mg/dL  Very High:  Greater than or equal to 500 mg/dL    Direct Measure HDL  Female:  Greater than or equal to 50 mg/dL   Male:  Greater than or equal to 40 mg/dL    LDL Cholesterol  Desirable:  <100mg/dL  Above Desirable:  100-129 mg/dL   Borderline High:  130-159 mg/dL   High:  160-189 mg/dL   Very High:  >= 190 mg/dL    Non HDL Cholesterol  Desirable:  130 mg/dL  Above Desirable:  130-159 mg/dL  Borderline High:  160-189 mg/dL  High:  190-219 mg/dL  Very High:  Greater than or equal to 220 mg/dL   TSH with free T4 reflex   Result Value Ref Range    TSH 3.67 0.30 - 4.20 uIU/mL   CBC with platelets   Result Value Ref Range    WBC Count 6.6 4.0 - 11.0 10e3/uL    RBC Count 4.20 3.80 - 5.20 10e6/uL    Hemoglobin 13.6 11.7 - 15.7 g/dL    Hematocrit 41.0 35.0 - 47.0 %    MCV 98 78 - 100 fL    MCH 32.4 26.5 - 33.0 pg    MCHC 33.2 31.5 - 36.5 g/dL    RDW 13.8 10.0 - 15.0 %    Platelet Count 294 150 - 450 10e3/uL   Iron & Iron Binding Capacity   Result Value Ref Range    Iron 66 37 - 145 ug/dL    Iron Binding Capacity 352 240 - 430 ug/dL    Iron Sat Index 19 15 - 46 %       If you have any questions or concerns, please call the clinic at the number listed above.       Sincerely,      Rodney Desai MD

## 2024-04-12 LAB
CHOLEST SERPL-MCNC: 258 MG/DL
FASTING STATUS PATIENT QL REPORTED: NO
FASTING STATUS PATIENT QL REPORTED: NO
GLUCOSE SERPL-MCNC: 94 MG/DL (ref 70–99)
HDLC SERPL-MCNC: 66 MG/DL
HIV 1+2 AB+HIV1 P24 AG SERPL QL IA: NONREACTIVE
IRON BINDING CAPACITY (ROCHE): 352 UG/DL (ref 240–430)
IRON SATN MFR SERPL: 19 % (ref 15–46)
IRON SERPL-MCNC: 66 UG/DL (ref 37–145)
LDLC SERPL CALC-MCNC: 153 MG/DL
NONHDLC SERPL-MCNC: 192 MG/DL
TRIGL SERPL-MCNC: 194 MG/DL
TSH SERPL DL<=0.005 MIU/L-ACNC: 3.67 UIU/ML (ref 0.3–4.2)

## 2024-04-23 PROBLEM — E78.5 HYPERLIPIDEMIA LDL GOAL <130: Status: ACTIVE | Noted: 2024-04-23

## 2024-04-23 RX ORDER — ROSUVASTATIN CALCIUM 5 MG/1
5 TABLET, COATED ORAL DAILY
Qty: 90 TABLET | Refills: 3 | Status: SHIPPED | OUTPATIENT
Start: 2024-04-23

## 2024-07-09 ENCOUNTER — TELEPHONE (OUTPATIENT)
Dept: FAMILY MEDICINE | Facility: CLINIC | Age: 64
End: 2024-07-09
Payer: COMMERCIAL

## 2024-07-09 NOTE — TELEPHONE ENCOUNTER
Patient Quality Outreach    Patient is due for the following:   Colon Cancer Screening  Breast Cancer Screening - Mammogram  Cervical Cancer Screening - PAP Needed  Physical Preventive Adult Physical      Topic Date Due    Pneumococcal Vaccine (1 of 2 - PCV) Never done    Zoster (Shingles) Vaccine (1 of 2) Never done    COVID-19 Vaccine (1 - 2023-24 season) Never done       Next Steps:   Schedule a Adult Preventative    Type of outreach:    Sent letter.      Questions for provider review:    None           Sharmaine Alvarenga MA

## 2024-07-09 NOTE — LETTER
[address] [address]  [phone]  Dept: [phone]    July 9, 2024    Paco Medrano  [address]  [facility] [address]    Dear Paco,    At [facility] we care about your health and are committed to providing quality patient care.     Here is a list of Health Maintenance topics that are due now or due soon:  Health Maintenance Due   Topic Date Due    Pneumococcal Vaccine: Pediatrics (0 to 5 Years) and At-Risk Patients (6 to 64 Years) (1 of 2 - PCV) Never done    ZOSTER IMMUNIZATION (1 of 2) Never done    YEARLY PREVENTIVE VISIT  08/25/2010    RSV VACCINE (Pregnancy & 60+) (1 - 1-dose 60+ series) Never done    MAMMO SCREENING  09/21/2020    HPV TEST  12/10/2020    PAP  12/10/2020    ADVANCE CARE PLANNING  07/19/2021    COLORECTAL CANCER SCREENING  01/01/2023    COVID-19 Vaccine (1 - 2023-24 season) Never done    NICOTINE/TOBACCO CESSATION COUNSELING Q 1 YR  06/06/2024    ANNUAL REVIEW OF HM ORDERS  06/06/2024        We are recommending that you:  Schedule a MAMMOGRAM which is due.    1 in 8 women will develop invasive breast cancer during her lifetime and it is the most common non-skin cancer in American women.  EARLY detection, new treatments, and a better understanding of the disease have increased survival rates - the 5 year survival rate in the 1960s was 63% and today it is close to 90%.    If you are under/uninsured, we recommend you contact the [facility]. They offer mammograms at no charge or on a sliding fee charge. You can schedule with them at [phone]. Please have them send us the results.      Please disregard this reminder if you have had this exam elsewhere within the last year.  It would be helpful for us to have a copy of your mammogram report in your file so that we can best coordinate your care - please contact us with when your test was done so we can update your  record.    ,   Schedule a COLONOSCOPY to assess for colon cancer (due every 10 years or 5 years in higher risk situations.)       Colon cancer is now the second leading cause of cancer-related deaths in the [place] for both men and women and there are over 130,000 new cases and 50,000 deaths per year from colon cancer.  Colonoscopies can prevent 90-95% of these deaths.  Problem lesions can be removed before they ever become cancer.  This test is not only looking for cancer, but also getting rid of precancerious lesions.    If you are under/uninsured, we recommend you contact the [employer] program. [employer] is a free colorectal cancer screening program that provides colonoscopies for eligible under/uninsured [place] men and women. If you are interested in receiving a free colonoscopy, please call [employer] at [phone] (mention code [employer]) to see if you re eligible.     If you do not wish to do a colonoscopy or cannot afford to do one, at this time, there is another option. It is called a FIT test or Fecal Immunochemical Occult Blood Test (take home stool sample kit).  It does not replace the colonoscopy for colorectal cancer screening, but it can detect hidden bleeding in the lower colon.  It does need to be repeated every year and if a positive result is obtained, you would be referred for a colonoscopy.    If you have completed either one of these tests at another facility, please call/respond to this message with the details of when and where the tests were done and if they were normal or not. Or have the records sent to our clinic so that we can best coordinate your care.  ,   Schedule a PAP SMEAR EXAM which is due.  Please disregard this reminder if you have had this exam elsewhere within the last year.  It would be helpful for us to have a copy of your recent pap smear report in our file so that we can best coordinate your care.    If you are under/uninsured, we recommend you contact  the [facility]. They offer pap smears at no charge or on a sliding fee charge. You can schedule with them at [phone]. Please have them send us the results.    , and   Schedule a Nurse-Only appointment to update your immunizations: Your records indicate that you are not up to date with your immunizations, please schedule a nurse-only appointment to get these updated or update them at your next office visit. If this is incorrect, please disregard.    To schedule an appointment or discuss this further, you may contact us by phone at the [facility] at [phone] or online through the patient portal/[email or web address] @ [email or web address]    Thank you for trusting [facility] and we appreciate the opportunity to serve you.  We look forward to supporting your healthcare needs in the future.    Your partners in health,      Quality Committee at [facility]

## 2024-08-19 DIAGNOSIS — J01.90 ACUTE SINUSITIS WITH SYMPTOMS > 10 DAYS: ICD-10-CM

## 2024-08-19 DIAGNOSIS — J32.9 RECURRENT SINUSITIS: ICD-10-CM

## 2024-08-30 ENCOUNTER — OFFICE VISIT (OUTPATIENT)
Dept: URGENT CARE | Facility: URGENT CARE | Age: 64
End: 2024-08-30
Payer: COMMERCIAL

## 2024-08-30 ENCOUNTER — ANCILLARY PROCEDURE (OUTPATIENT)
Dept: GENERAL RADIOLOGY | Facility: CLINIC | Age: 64
End: 2024-08-30
Attending: PHYSICIAN ASSISTANT
Payer: COMMERCIAL

## 2024-08-30 VITALS
RESPIRATION RATE: 18 BRPM | DIASTOLIC BLOOD PRESSURE: 85 MMHG | WEIGHT: 139.6 LBS | BODY MASS INDEX: 25.74 KG/M2 | HEART RATE: 110 BPM | OXYGEN SATURATION: 96 % | SYSTOLIC BLOOD PRESSURE: 131 MMHG | TEMPERATURE: 98.1 F

## 2024-08-30 DIAGNOSIS — Z87.81 HISTORY OF FOOT FRACTURE: ICD-10-CM

## 2024-08-30 DIAGNOSIS — M79.672 LEFT FOOT PAIN: ICD-10-CM

## 2024-08-30 DIAGNOSIS — M79.672 LEFT FOOT PAIN: Primary | ICD-10-CM

## 2024-08-30 PROCEDURE — 99214 OFFICE O/P EST MOD 30 MIN: CPT | Performed by: PHYSICIAN ASSISTANT

## 2024-08-30 PROCEDURE — 73630 X-RAY EXAM OF FOOT: CPT | Mod: TC | Performed by: RADIOLOGY

## 2024-08-30 ASSESSMENT — PAIN SCALES - GENERAL: PAINLEVEL: EXTREME PAIN (9)

## 2024-08-31 NOTE — PROGRESS NOTES
Chief Complaint   Patient presents with    Musculoskeletal Problem     Left foot pain for the past month, worse at night and pain travels from foot to shin     Ear Problem     Left ear pain on and off for the past month      Xrays- I see irregular surface of navicular on oblique view.    Results for orders placed or performed in visit on 08/30/24   XR Foot Left G/E 3 Views     Status: None    Narrative    EXAM: XR FOOT LEFT G/E 3 VIEWS  LOCATION: [facility]  DATE: 8/30/2024    INDICATION: Left foot pain.  COMPARISON: None.      Impression    IMPRESSION: Mild soft tissue swelling in the dorsal midfoot. Normal joint spaces and alignment. No fracture.       ASSESSMENT:    ICD-10-CM    1. Left foot pain  M79.672 XR Foot Left G/E 3 Views      2. History of foot fracture  Z87.81             PLAN: Left mid dorsal foot pain, unclear etiology.  Some irregularity noted on the navicular surface per my read, oblique view.  ? Arthritic changes.Topical Voltaren, ice, Ace, see podiatry.  Left foot pain-differential includes but is not limited to arthritis, musculoskeletal, restless leg syndrome, lumbar radiculopathy, phlebitis, plantar fasciitis.  Consider potassium, magnesium, calcium supplements.  Jaylyn Amezquita PA-C        SUBJECTIVE:  Paco Medrano is an 64 year old female who presents with left mid foot pain for a month.  Does not recall any specific injury but she states she cannot exclude dropping something on her foot.  At times will radiate upward into the lateral ankle/lower leg with tingling.  Also has had some intermittent low back pain over the last month.  Has tried over-the-counter NSAIDs without relief.  No bowel or bladder trouble.  Can tell when she has to go to bathroom and is able to get there on time.  Pain is definitely worse with prolonged standing or when she is sleeping.  On Ambien for insomnia.    History of fifth metatarsal fracture in the past.  Also left ear  discomfort x 1month intermittently.    No past medical history on file.  History   Smoking Status    Every Day    Types: Cigarettes   Smokeless Tobacco    Never       ROS:  GEN no fevers  SKIN no erythema  Musculoskeletal:  See HPI.      OBJECTIVE:  Blood pressure 131/85, pulse 110, temperature 98.1  F (36.7  C), temperature source Tympanic, resp. rate 18, weight 63.3 kg (139 lb 9.6 oz), SpO2 96%, not currently breastfeeding.  Patient is alert and NAD.  EYES: conjunctiva clear  Ears: Left TM translucent.  Right TM translucent.  Ankle/foot   Exam (left):  Inspection: Mild swelling left mid dorsal foot.  Palpation: Tender left mid dorsal foot.  Arch nontender.  Good doralis pedis.  Neurovascularly Intact Distally.   Skin: No skin redness.  Do not see any swollen, tender veins in the area of tenderness.  No calf swelling or tenderness.  Negative Homans.  Lumbar spine, sacroiliac joints, sciatic notch is nontender.  Full forward flexion and extension without discomfort.  Negative sitting straight leg raise bilaterally.  Jaylyn Amezquita PA-C

## 2024-09-03 RX ORDER — MOMETASONE FUROATE MONOHYDRATE 50 UG/1
SPRAY, METERED NASAL
Qty: 17 G | Refills: 2 | Status: SHIPPED | OUTPATIENT
Start: 2024-09-03

## 2024-09-21 ENCOUNTER — NURSE TRIAGE (OUTPATIENT)
Dept: NURSING | Facility: CLINIC | Age: 64
End: 2024-09-21
Payer: COMMERCIAL

## 2024-09-22 NOTE — TELEPHONE ENCOUNTER
Nurse Triage SBAR    Is this a 2nd Level Triage? NO    Situation:  Laceration    Background:  Pt reports she was putting dishes away tonight and one of the knives sticking out cut her. She reports the incident happened about 3-4 hrs ago. Pt reports initially it seemed like a lot of bld.     Assessment:  Pt reports a laceration on the left side of her right wrist, that she thinks is less the 1/2 inch long and less then 1/4 inch deep. Pt states the area has now stopped bleeding. Reports it doesn't look infected or dirty.     Protocol Recommended Disposition:   Home Care    Recommendation: Home care. Protocol and care advice reviewed. Advised to call back with any new or worsening signs, symptoms, concerns, or questions. They verbalized understanding and agreed to follow advice given.    Reason for Disposition   Minor cut or scratch    Additional Information   Negative: [1] Major bleeding (e.g., actively dripping or spurting) AND [2] can't be stopped   Negative: Amputation   Negative: Shock suspected (e.g., cold/pale/clammy skin, too weak to stand, low BP, rapid pulse)   Negative: [1] Knife wound (or other possibly deep cut) AND [2] to chest, abdomen, back, neck, or head   Negative: [1] Self-injury (e.g., cutting, self-harm) AND [2] suicidal or out-of-control   Negative: Sounds like a life-threatening emergency to the triager   Negative: [1] Bleeding AND [2] won't stop after 10 minutes of direct pressure (using correct technique)   Negative: Skin is split open or gaping (or length > 1/2 inch or 12 mm on the skin, 1/4 inch or 6 mm on the face)   Negative: [1] Deep cut AND [2] can see bone or tendons   Negative: Cut over knuckle (MCP joint)   Negative: Sensation of something in the wound (i.e., retained object in wound)   Negative: [1] Dirt in the wound AND [2] not removed with 15 minutes of scrubbing   Negative: Wound causes numbness (i.e., loss of sensation)   Negative: Wound causes weakness (i.e., decreased ability to  move hand, finger, toe)   Negative: [1] SEVERE pain AND [2] not improved 2 hours after pain medicine   Negative: [1] Looks infected AND [2] large red area (> 2 inches or 5 cm) or streak   Negative: [1] Fever AND [2] spreading red area or streak   Negative: Suspicious history for the injury   Negative: [1] Looks infected (spreading redness, pus) AND [2] no fever   Negative: No prior tetanus shots (or is not fully vaccinated)   Negative: [1] HIV positive or severe immunodeficiency (severely weak immune system) AND [2] DIRTY cut   Negative: [1] Last tetanus shot > 5 years ago AND [2] DIRTY cut   Negative: [1] Last tetanus shot > 10 years ago AND [2] CLEAN cut   Negative: [1] After 14 days AND [2] wound isn't healed   Negative: [1] Diabetes mellitus AND [2] minor cut or scratch on foot   Negative: [1] Minor cut or scratch AND [2] from self-injury (e.g., cutting, self-harm) AND [3] stable (i.e., not suicidal, not out of control)    Protocols used: Cuts and Axphbpinhja-B-CO

## 2024-10-02 DIAGNOSIS — F51.04 PSYCHOPHYSIOLOGICAL INSOMNIA: ICD-10-CM

## 2024-10-03 RX ORDER — ZOLPIDEM TARTRATE 5 MG/1
TABLET ORAL
Qty: 30 TABLET | OUTPATIENT
Start: 2024-10-03

## 2024-10-03 RX ORDER — ZOLPIDEM TARTRATE 5 MG/1
TABLET ORAL
Qty: 30 TABLET | Refills: 5 | Status: SHIPPED | OUTPATIENT
Start: 2024-10-03

## 2024-12-03 ENCOUNTER — OFFICE VISIT (OUTPATIENT)
Dept: FAMILY MEDICINE | Facility: CLINIC | Age: 64
End: 2024-12-03
Payer: COMMERCIAL

## 2024-12-03 VITALS
WEIGHT: 135.4 LBS | DIASTOLIC BLOOD PRESSURE: 85 MMHG | HEIGHT: 62 IN | RESPIRATION RATE: 19 BRPM | BODY MASS INDEX: 24.92 KG/M2 | OXYGEN SATURATION: 100 % | TEMPERATURE: 97 F | HEART RATE: 91 BPM | SYSTOLIC BLOOD PRESSURE: 134 MMHG

## 2024-12-03 DIAGNOSIS — H91.93 DECREASED HEARING OF BOTH EARS: ICD-10-CM

## 2024-12-03 DIAGNOSIS — J01.01 ACUTE RECURRENT MAXILLARY SINUSITIS: Primary | ICD-10-CM

## 2024-12-03 DIAGNOSIS — H65.93 MIDDLE EAR EFFUSION, BILATERAL: ICD-10-CM

## 2024-12-03 PROCEDURE — 99214 OFFICE O/P EST MOD 30 MIN: CPT | Performed by: FAMILY MEDICINE

## 2024-12-03 RX ORDER — FLUTICASONE PROPIONATE 50 MCG
1 SPRAY, SUSPENSION (ML) NASAL DAILY
Qty: 9.9 ML | Refills: 1 | Status: SHIPPED | OUTPATIENT
Start: 2024-12-03

## 2024-12-03 RX ORDER — AMOXICILLIN 500 MG/1
500 CAPSULE ORAL 2 TIMES DAILY
Qty: 20 CAPSULE | Refills: 0 | Status: SHIPPED | OUTPATIENT
Start: 2024-12-03 | End: 2024-12-13

## 2024-12-03 RX ORDER — LORATADINE 10 MG/1
10 TABLET ORAL DAILY
Qty: 30 TABLET | Refills: 0 | Status: SHIPPED | OUTPATIENT
Start: 2024-12-03

## 2024-12-03 RX ORDER — BENZONATATE 100 MG/1
100 CAPSULE ORAL
Qty: 30 CAPSULE | Refills: 0 | Status: SHIPPED | OUTPATIENT
Start: 2024-12-03

## 2024-12-03 ASSESSMENT — PATIENT HEALTH QUESTIONNAIRE - PHQ9
SUM OF ALL RESPONSES TO PHQ QUESTIONS 1-9: 3
10. IF YOU CHECKED OFF ANY PROBLEMS, HOW DIFFICULT HAVE THESE PROBLEMS MADE IT FOR YOU TO DO YOUR WORK, TAKE CARE OF THINGS AT HOME, OR GET ALONG WITH OTHER PEOPLE: NOT DIFFICULT AT ALL
SUM OF ALL RESPONSES TO PHQ QUESTIONS 1-9: 3

## 2024-12-03 ASSESSMENT — PAIN SCALES - GENERAL: PAINLEVEL_OUTOF10: NO PAIN (0)

## 2024-12-03 NOTE — PROGRESS NOTES
Assessment & Plan     Acute recurrent maxillary sinusitis  -Paco has been having discomfort in left maxillary sinus for more than a month.  -Mild cough symptoms which is improving.    - amoxicillin (AMOXIL) 500 MG capsule; Take 1 capsule (500 mg) by mouth 2 times daily for 10 days.  - benzonatate (TESSALON) 100 MG capsule; Take 1 capsule (100 mg) by mouth nightly as needed for cough.    Middle ear effusion, bilateral  -Bilateral nonerythematous intact TM noted with bilateral middle ear effusion.    - fluticasone (FLONASE) 50 MCG/ACT nasal spray; Spray 1 spray into both nostrils daily.  - loratadine (CLARITIN) 10 MG tablet; Take 1 tablet (10 mg) by mouth daily.    Decreased hearing of both ears  -Paco has been told by her friends that she is having hearing issue.  -May be due to conductive hearing loss secondary to middle ear effusion.  -She is going on a trip to [place] soon.  -Recommended to schedule hearing assessment if she still has discomfort once back from the trip.    - Adult Audiology  Referral; Future          Nicotine/Tobacco Cessation  She reports that she has been smoking cigarettes. She started smoking about 12 years ago. She has a 6.4 pack-year smoking history. She has never used smokeless tobacco.  Nicotine/Tobacco Cessation Plan              Subjective   Paco is a 64 year old, presenting for the following health issues:  Sinus Problem        12/3/2024     1:52 PM   Additional Questions   Roomed by Leeroy     History of Present Illness       Reason for visit:  Sinus  Symptoms include:  Covid over 10 ago, loss of taste and smell still, fatigue, productive cough, sinus pressure, congestion, runny nose, vomiting and diarrhea 4 days ago  Symptom intensity:  Severe  Symptom progression:  Improving  Had these symptoms before:  Yes  Has tried/received treatment for these symptoms:  No   She is taking medications regularly.                 Review of Systems  Constitutional, HEENT,  "cardiovascular, pulmonary, gi and gu systems are negative, except as otherwise noted.      Objective    /85 (BP Location: Left arm, Patient Position: Sitting, Cuff Size: Adult Regular)   Pulse 91   Temp 97  F (36.1  C) (Temporal)   Resp 19   Ht 1.568 m (5' 1.73\")   Wt 61.4 kg (135 lb 6.4 oz)   LMP  (LMP Unknown)   SpO2 100%   BMI 24.98 kg/m    Body mass index is 24.98 kg/m .  Physical Exam   GENERAL: alert and no distress  NECK: no adenopathy, no asymmetry, masses, or scars  RESP: lungs clear to auscultation - no rales, rhonchi or wheezes  CV: regular rate and rhythm, normal S1 S2, no S3 or S4, no murmur, click or rub, no peripheral edema  ABDOMEN: soft, nontender, no hepatosplenomegaly, no masses and bowel sounds normal  MS: no gross musculoskeletal defects noted, no edema            Signed Electronically by: Jeana Coon MD    "

## 2024-12-30 ENCOUNTER — NURSE TRIAGE (OUTPATIENT)
Dept: NURSING | Facility: CLINIC | Age: 64
End: 2024-12-30
Payer: COMMERCIAL

## 2024-12-30 NOTE — TELEPHONE ENCOUNTER
"  Nurse Triage SBAR    Is this a 2nd Level Triage? NO    Situation: R arm pain    Background: Patient calls with concerns that she is having \" trouble with her R arm\" she states that she is having increased pain with lifting of her arm the pain worsens. She states that it goes from upper to lower arm. She was carrying packages. Rates pain 9-10/10 with movement. Denies discoloration, denies swelling,     Assessment: increased pain with lifting of her arm the pain worsens. She states that it goes from upper to lower arm. She was carrying packages. Rates pain 9-10/10 with movement. Denies discoloration, denies swelling, She states that she has uttilized several interventions with no improvement to her pain.    Protocol Recommended Disposition:   See HCP Within 4 Hours (Or PCP Triage)    Patient refused disposition and requested to schedule an appt on Tuesday as that is her only day off.     Recommendation: care advice given          Does the patient meet one of the following criteria for ADS visit consideration? 16+ years old, with an [facility] PCP     TIP  Providers, please consider if this condition is appropriate for management at one of our Acute and Diagnostic Services sites.     If patient is a good candidate, please use dotphrase <dot>triageresponse and select Refer to ADS to document.    Reason for Disposition   [1] Arm pains with exertion (e.g., walking) AND [2] pain goes away on resting AND [3] not present now    Additional Information   Negative: Shock suspected (e.g., cold/pale/clammy skin, too weak to stand, low BP, rapid pulse)   Negative: [1] Similar pain previously AND [2] it was from \"heart attack\"   Negative: [1] Similar pain previously AND [2] it was from \"angina\" AND [3] not relieved by nitroglycerin   Negative: Sounds like a life-threatening emergency to the triager   Negative: Followed an arm injury   Negative: Chest pain   Negative: Pain, redness, or swelling at intravenous (IV) site or along course " of vein   Negative: Wound looks infected   Negative: Elbow pain is main symptom   Negative: Wrist pain is main symptom   Negative: Difficulty breathing or unusual sweating (e.g., sweating without exertion)   Negative: [1] Age > 40 AND [2] associated chest or jaw pain AND [3] pain lasts > 5 minutes   Negative: [1] Age > 40 AND [2] no obvious cause AND [3] pain even when not moving the arm  (Exception: Pain is clearly made worse by moving arm or bending neck.)   Negative: [1] SEVERE pain AND [2] not improved 2 hours after pain medicine   Negative: [1] Red area or streak AND [2] fever   Negative: [1] Swollen joint AND [2] fever   Negative: Patient sounds very sick or weak to the triager   Negative: [1] Red area or streak AND [2] large (> 2 in. or 5 cm)   Negative: Entire arm is swollen   Negative: [1] Cast on wrist or arm AND [2] now increased pain   Negative: Weakness (i.e., loss of strength) in hand or fingers  (Exception: Not truly weak; hand feels weak because of pain.)    Protocols used: Arm Pain-A-

## 2024-12-31 ENCOUNTER — OFFICE VISIT (OUTPATIENT)
Dept: FAMILY MEDICINE | Facility: CLINIC | Age: 64
End: 2024-12-31
Payer: COMMERCIAL

## 2024-12-31 VITALS
BODY MASS INDEX: 23.88 KG/M2 | SYSTOLIC BLOOD PRESSURE: 139 MMHG | TEMPERATURE: 97.7 F | DIASTOLIC BLOOD PRESSURE: 76 MMHG | WEIGHT: 134.8 LBS | HEIGHT: 63 IN | HEART RATE: 107 BPM | RESPIRATION RATE: 16 BRPM | OXYGEN SATURATION: 98 %

## 2024-12-31 DIAGNOSIS — S43.401A SPRAIN OF RIGHT SHOULDER, UNSPECIFIED SHOULDER SPRAIN TYPE, INITIAL ENCOUNTER: Primary | ICD-10-CM

## 2024-12-31 DIAGNOSIS — F41.9 ANXIETY: ICD-10-CM

## 2024-12-31 DIAGNOSIS — F51.04 PSYCHOPHYSIOLOGICAL INSOMNIA: ICD-10-CM

## 2024-12-31 PROCEDURE — 99214 OFFICE O/P EST MOD 30 MIN: CPT | Performed by: PHYSICIAN ASSISTANT

## 2024-12-31 RX ORDER — PREDNISONE 20 MG/1
40 TABLET ORAL DAILY
Qty: 10 TABLET | Refills: 0 | Status: SHIPPED | OUTPATIENT
Start: 2024-12-31 | End: 2025-01-05

## 2024-12-31 RX ORDER — CYCLOBENZAPRINE HCL 10 MG
5-10 TABLET ORAL 3 TIMES DAILY PRN
Qty: 30 TABLET | Refills: 0 | Status: SHIPPED | OUTPATIENT
Start: 2024-12-31

## 2024-12-31 NOTE — PROGRESS NOTES
Assessment & Plan     Sprain of right shoulder, unspecified shoulder sprain type, initial encounter  Mostly seems to be affecting her deltoid, we discussed relative rest but the importance of gradual range of motion to avoid frozen shoulder.  Discussed physical therapy versus medication options.  Patient prefers to avoid physical therapy if possible.  Discussed prednisone and cyclobenzaprine including potential adverse effects.  Limit NSAIDs while on prednisone.  Discussed increased sedation if using her Ambien and cyclobenzaprine together.  - predniSONE (DELTASONE) 20 MG tablet; Take 2 tablets (40 mg) by mouth daily for 5 days.  - cyclobenzaprine (FLEXERIL) 10 MG tablet; Take 0.5-1 tablets (5-10 mg) by mouth 3 times daily as needed for muscle spasms.      Psychophysiological insomnia  Discussed potential increased effect of her Ambien if using the cyclobenzaprine at nighttime as well.  Patient will monitor closely, she notes she has done this in the past and tolerated it fine.    Anxiety  Potential increase with prednisone.                Da Antonio is a 64 year old, presenting for the following health issues:  Shoulder Pain and Ear Problem      12/31/2024     2:47 PM   Additional Questions   Roomed by greg   Accompanied by self         12/31/2024     2:47 PM   Patient Reported Additional Medications   Patient reports taking the following new medications no     Via the Health Maintenance questionnaire, the patient has reported the following services have been completed -Mammogram: at this clinic 2021-08-19, this information has been sent to the abstraction team.  Shoulder Pain    Ear Problem    History of Present Illness       Reason for visit:  Shoulder  Symptom onset:  1-2 weeks ago   She is taking medications regularly.         Pain History:  When did you first notice your pain? 2 weeks    Have you seen anyone else for your pain? No  How has your pain affected your ability to work?   Where in your  "body do you have pain? Musculoskeletal problem/pain  Onset/Duration: 2 weeks  Description  Location: Shoulder - right  Joint Swelling: No  Redness: No  Pain: YES  Warmth: No  Intensity:  severe  Progression of Symptoms:  worsening  Accompanying signs and symptoms:   Fevers: No  Numbness/tingling/weakness: No  History  Trauma to the area: no   Recent illness:  No  Previous similar problem: No  Previous evaluation:  No  Precipitating or alleviating factors:  Aggravating factors include: lifting and overuse  Therapies tried and outcome: Advil and icy hot- does not help, heating pad does not help also     For about 2 weeks has been dealing with right shoulder pain.  No specific injury.  Had been traveling and carrying a lot of bags as well as cooking and carrying a lot of things for the holidays recently.  She is right-handed.  Does not recall an incident where there was a sudden pain.  Has had ongoing severe pain to her right shoulder.  It is worse with movement especially lifting.  No change with moving her neck.  No radiation down her arm.  No associated numbness, tingling, weakness.  Using 800 mg ibuprofen and topical pain reliever, only helps briefly.    She was recently seen for sinus and ear symptoms.  Went on vacation and did feel like things improved but now uncertain if her ears are clear or not.            Objective    /76 (BP Location: Left arm, Patient Position: Sitting, Cuff Size: Adult Regular)   Pulse 107   Temp 97.7  F (36.5  C) (Oral)   Resp 16   Ht 1.6 m (5' 3\")   Wt 61.1 kg (134 lb 12.8 oz)   LMP  (LMP Unknown)   SpO2 98%   BMI 23.88 kg/m    Body mass index is 23.88 kg/m .  Physical Exam   GENERAL: alert and no distress  HENT: normal cephalic/atraumatic and ear canals and TM's normal  MS: No bony tenderness to cervical spine or right shoulder.  Right shoulder with mild tenderness to right trapezius musculature, moderate tenderness to right deltoid.  Active range of motion intact but " does trigger significant pain especially with abduction.  Passive range of motion tolerated.  Left shoulder used as comparison unremarkable.  SKIN: no suspicious lesions or rashes  NEUROVASCULAR: Normal strength and tone including  strength intact and equal bilaterally.  Distal sensation intact.  Radial pulses 2+.            Signed Electronically by: Katie Upton PA-C

## 2025-01-28 ENCOUNTER — NURSE TRIAGE (OUTPATIENT)
Dept: FAMILY MEDICINE | Facility: CLINIC | Age: 65
End: 2025-01-28
Payer: COMMERCIAL

## 2025-01-28 NOTE — TELEPHONE ENCOUNTER
"Patient evaluated by urgent care on 12/31/2024 for right shoulder/arm pain   Pain has not subsided and now patient states is severe     Writer does not schedule for [facility] - routed back to scheduling to see if any openings in this clinic     Patient advised if no openings to go to [facility] for evaluation of severe pain     Reason for Disposition   SEVERE pain (e.g., excruciating, unable to do any normal activities)    Additional Information   Negative: Shock suspected (e.g., cold/pale/clammy skin, too weak to stand, low BP, rapid pulse)   Negative: Similar pain previously and it was from 'heart attack'   Negative: Similar pain previously from 'angina' and not relieved by nitroglycerin   Negative: Sounds like a life-threatening emergency to the triager   Negative: Followed an injury to arm   Negative: Chest pain   Negative: Wound looks infected (e.g., spreading redness, pus)   Negative: Elbow pain is main symptom   Negative: Wrist pain is main symptom   Negative: Difficulty breathing or unusual sweating (e.g., sweating without exertion)   Negative: Chest pain lasting longer than 5 minutes   Negative: Age > 40 and no obvious cause for pain, pain still present even when not moving the arm   Negative: Fever and red area (or area very tender to touch)   Negative: Swollen joint and fever   Negative: Entire arm is swollen   Negative: Patient sounds very sick or weak to the triager    Answer Assessment - Initial Assessment Questions  1. ONSET: \"When did the pain start?\"      Was seen on 12/31/24 for right shoulder pain   2. LOCATION: \"Where is the pain located?\"      Right upper arm   3. PAIN: \"How bad is the pain?\" (Scale 0-10; or none, mild, moderate, severe)      10 on pain scale, pain keeping her up at night   4. 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   5. CAUSE: " "\"What do you think is causing the arm pain?\"      Lifting heavy things during the holidays   6. OTHER SYMPTOMS: \"Do you have any other symptoms?\" (e.g., neck pain, swelling, rash, fever, numbness, weakness)      No   Patient notes that prior to the arm pain starting her middle finger on right hand has been locking up on and off   7. PREGNANCY: \"Is there any chance you are pregnant?\" \"When was your last menstrual period?\"      NA    Protocols used: Arm Pain-A-[place]    Rachel Cool, Registered Nurse  [facility]     "

## 2025-02-21 ENCOUNTER — OFFICE VISIT (OUTPATIENT)
Dept: URGENT CARE | Facility: URGENT CARE | Age: 65
End: 2025-02-21
Payer: COMMERCIAL

## 2025-02-21 VITALS
BODY MASS INDEX: 24.04 KG/M2 | DIASTOLIC BLOOD PRESSURE: 84 MMHG | WEIGHT: 135.7 LBS | SYSTOLIC BLOOD PRESSURE: 133 MMHG | TEMPERATURE: 98.3 F | RESPIRATION RATE: 16 BRPM | OXYGEN SATURATION: 96 % | HEART RATE: 117 BPM

## 2025-02-21 DIAGNOSIS — M25.511 ACUTE PAIN OF RIGHT SHOULDER: Primary | ICD-10-CM

## 2025-02-21 DIAGNOSIS — K21.9 GASTROESOPHAGEAL REFLUX DISEASE WITHOUT ESOPHAGITIS: ICD-10-CM

## 2025-02-21 PROBLEM — F43.0 STRESS DISORDER, ACUTE: Status: ACTIVE | Noted: 2021-02-26

## 2025-02-21 PROBLEM — F32.A DEPRESSION, ACUTE: Status: ACTIVE | Noted: 2021-02-27

## 2025-02-21 PROCEDURE — 99214 OFFICE O/P EST MOD 30 MIN: CPT | Performed by: FAMILY MEDICINE

## 2025-02-21 RX ORDER — CYCLOBENZAPRINE HCL 5 MG
5 TABLET ORAL
Qty: 30 TABLET | Refills: 0 | Status: SHIPPED | OUTPATIENT
Start: 2025-02-21

## 2025-02-21 RX ORDER — FAMOTIDINE 40 MG/1
40 TABLET, FILM COATED ORAL 2 TIMES DAILY
Qty: 60 TABLET | Refills: 0 | Status: SHIPPED | OUTPATIENT
Start: 2025-02-21 | End: 2025-03-23

## 2025-02-21 NOTE — LETTER
February 21, 2025      Paco Medrano  [address] [facility] [address]        To Whom It May Concern:    Paco Medrano  was seen on 2/21/2025.  Please excuse her her from work 2/21/2025 and can return to work on on 2/26/2025.          Sincerely,        Yoana Scott MD

## 2025-02-22 NOTE — PROGRESS NOTES
ASSESSMENT/PLAN:      ICD-10-CM    1. Acute pain of right shoulder  M25.511 Orthopedic  Referral     cyclobenzaprine (FLEXERIL) 5 MG tablet     famotidine (PEPCID) 40 MG tablet      2. Gastroesophageal reflux disease without esophagitis  K21.9     pt with history of GERD, mild worsneing of symptoms with NSAID, will start pepcid 40 mg bid      Onset 2 months ago right shoulder pain-pain posterior shoulder and deltoid- no history of injury,- thought due to carrying heavy luggage-seen by PCP 12/31/2024 prednisone/ Flexeril ibuprofen with relief, After completed meds pain returned and worsened-offered option for prednisone-patient declined-makes her feel edgy and unable to sleep, agreed to another trial of Flexeril that helped with sleep and pain at night, and using ibuprofen alternating with Tylenol for pain-Pepcid for GERD symptoms with ibuprofen, Ortho for further evaluation possible options PT and pain management    Patient Instructions     For pain    Start  Ibuprofen (motrin/advil)  600 mg 3 times a day always take with food for the next 2 to 3 days until pain resolves-maximum dose of ibuprofen is 1800 mg in 24 hours -no ibuprofen day of orthopedic visit     If pain increases before next dose of ibuprofen take tylenol as noted below    Acetaminophen (Tylenol ) 1000 mg 3 times a day for the next 2 to 3 days until pain resolves-maximum dose of acetaminophen (tylenol)  is 3000 mg in 24-hours     Start flexeril at bedtime-muscle relaxant -will make you sleepy, take second dose if wake up in pain      Heat to area     No heavy lifting/reaching, pulling/pushing-no more than 10 lb which is a gallon jug of milk    Take pepcid 40 mg 1 times a day for 30 days (correction- take Pepcid 2 times a day for 30 days AM and nightly    Call Monday am ortho  service to schedule appointment [phone].        to ER if symptoms worsen :     Follow up with your primary care provider or clinic in about 2-3 days   if your symptoms do not improve              Reviewed medication instructions and side effects. Follow up if experiences side effects.     I reviewed supportive care, otc meds to use if needed, expected course, and signs of concern.  Follow up as needed or if she does not improve within  1-2 days or if worsens in any way.  Reviewed red flag symptoms and is to go to the ER if experiences any of these.     The use of Dragon/[employer] services may have been used to construct the content in this note; any grammatical or spelling errors are non-intentional. Please contact the author of this note directly if you are in need of any clarification.      On the day of the encounter, time spend on chart review, patient visit, review of testing, documentation was 30 minutes              Patient presents with:  Urgent Care  Shoulder Pain: Right shoulder and arm pain, was seen in December and is not going away        Subjective     Paco Medrano is a 65 year old female who presents to clinic today for the following health issues:    HPI       Musculoskeletal problem/pain    Duration: Initial onset 2 months ago, treated 12/31/2024 pain seemed to improve and pain medication in the last 6 weeks pain is progressively worsened-  Description  Location: Right shoulder     Accompanying signs and symptoms: Pain posterior shoulder radiating into deltoid  History  Previous similar problem: YES-as noted  Previous evaluation:  -Seen 12/31/2024 diagnosed with sprain of right shoulder  Precipitating or alleviating factors:  Trauma or overuse: YES-overuse-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  Social  History     Tobacco Use    Smoking status: Every Day     Current packs/day: 0.50     Average packs/day: 0.5 packs/day for 13.1 years (6.6 ttl pk-yrs)     Types: Cigarettes     Start date: 1/9/2012    Smokeless tobacco: Never    Tobacco comments:     Smoked as teenager 4-5 years, 5 cigarettes per day. Quit and then restarted at age 48, 6-7 cigarettes per day up to now.    Substance Use Topics    Alcohol use: Yes     Alcohol/week: 0.0 standard drinks of alcohol       Current Outpatient Medications   Medication Sig Dispense Refill    cyclobenzaprine (FLEXERIL) 5 MG tablet Take 1 tablet (5 mg) by mouth at bedtime as needed, may repeat once for muscle spasms. 30 tablet 0    famotidine (PEPCID) 40 MG tablet Take 1 tablet (40 mg) by mouth 2 times daily. 60 tablet 0    Pseudoephedrine-Ibuprofen (ADVIL COLD/SINUS PO)       zolpidem (AMBIEN) 5 MG tablet TAKE 1 TABLET(5 MG) BY MOUTH EVERY NIGHT AT BEDTIME AS NEEDED FOR SLEEP 30 tablet 5     Allergies   Allergen Reactions    Azithromycin Nausea    Erythromycin      gi upset    Septra [Sulfamethoxazole W-Trimethoprim] Rash             ROS are negative, except as otherwise noted HPI      Objective    /84 (BP Location: Left arm, Patient Position: Sitting, Cuff Size: Adult Regular)   Pulse 117   Temp 98.3  F (36.8  C) (Oral)   Resp 16   Wt 61.6 kg (135 lb 11.2 oz)   LMP  (LMP Unknown)   SpO2 96%   BMI 24.04 kg/m    Body mass index is 24.04 kg/m .  Physical Exam   GENERAL: alert and no distress  MS: right shoulder-pain in posterior shoulder radiating into the deltoid with active and passive range of motion, more severe with abduction  Tender to palpation over deltoid  SIN-no rashes  NEURO: Normal strength and tone, mentation intact and speech normal gait      Diagnostic Test Results:  Labs reviewed in Epic  No results found for any visits on 02/21/25.

## 2025-02-22 NOTE — PATIENT INSTRUCTIONS
For pain    Start  Ibuprofen (motrin/advil)  600 mg 3 times a day always take with food for the next 2 to 3 days until pain resolves-maximum dose of ibuprofen is 1800 mg in 24 hours -no ibuprofen day of orthopedic visit     If pain increases before next dose of ibuprofen take tylenol as noted below    Acetaminophen (Tylenol ) 1000 mg 3 times a day for the next 2 to 3 days until pain resolves-maximum dose of acetaminophen (tylenol)  is 3000 mg in 24-hours     Start flexeril at bedtime-muscle relaxant -will make you sleepy, take second dose if wake up in pain      Heat to area     No heavy lifting/reaching, pulling/pushing-no more than 10 lb which is a gallon jug of milk    Take pepcid 40 mg 1 times a day for 30 days (correction- take Pepcid 2 times a day for 30 days AM and nightly    Call Monday am  service to schedule appointment [phone].        to ER if symptoms worsen :     Follow up with your primary care provider or clinic in about 2-3 days  if your symptoms do not improve

## 2025-02-26 NOTE — TELEPHONE ENCOUNTER
REASON FOR VISIT: Acute pain of right shoulder    DATE OF APPT: 3/12/2025   NOTES (FOR ALL VISITS) STATUS DETAILS   OFFICE NOTE from referring provider [facility]  Yoana Scott MD 2/21/2025   EMG N/A    MEDICATION LIST N/A    IMAGING  (FOR ALL VISITS)     XR N/A    MRI (HEAD, NECK, SPINE) N/A    CT (HEAD, NECK, SPINE) N/A

## 2025-03-12 ENCOUNTER — PRE VISIT (OUTPATIENT)
Dept: ORTHOPEDICS | Facility: CLINIC | Age: 65
End: 2025-03-12

## 2025-03-31 DIAGNOSIS — F51.04 PSYCHOPHYSIOLOGICAL INSOMNIA: ICD-10-CM

## 2025-03-31 RX ORDER — ZOLPIDEM TARTRATE 5 MG/1
5 TABLET ORAL
Qty: 30 TABLET | Refills: 0 | Status: SHIPPED | OUTPATIENT
Start: 2025-03-31

## 2025-03-31 NOTE — TELEPHONE ENCOUNTER
March 31, 2025  Rodney Desai MD to [facility]      3/31/25  1:44 PM  Let patient know she needs to be seen for further refills after this.    Called and spoke to the patient and she will schedule an appointment.  Elvi Meza MA/  [facility]   Primary Care

## 2025-03-31 NOTE — TELEPHONE ENCOUNTER
Medication Question or Refill    Contacts       Contact Date/Time Type Contact Phone/Fax    03/31/2025 01:28 PM CDT Phone (Incoming) Paco Medrano (Self) [phone] (H)            What medication are you calling about (include dose and sig)?: zolpidem (AMBIEN) 5 MG tablet     Preferred Pharmacy:    [id number] [place] [id number] [place] AT [facility] [address] [place] [address]  Phone: [phone] Fax: [phone]          Controlled Substance Agreement on file:   CSA -- Patient Level:    CSA: None found at the patient level.       Who prescribed the medication?: Rodney Desai MD     Do you need a refill? Yes    Patient offered an appointment? No    Do you have any questions or concerns?  No      Okay to leave a detailed message?: Yes at Cell number on file:    Telephone Information:   Mobile [phone]

## 2025-04-23 DIAGNOSIS — F51.04 PSYCHOPHYSIOLOGICAL INSOMNIA: ICD-10-CM

## 2025-04-24 RX ORDER — ZOLPIDEM TARTRATE 5 MG/1
TABLET ORAL
Qty: 30 TABLET | Refills: 0 | Status: SHIPPED | OUTPATIENT
Start: 2025-04-24

## 2025-04-29 ENCOUNTER — OFFICE VISIT (OUTPATIENT)
Dept: FAMILY MEDICINE | Facility: CLINIC | Age: 65
End: 2025-04-29
Payer: COMMERCIAL

## 2025-04-29 VITALS
HEIGHT: 62 IN | OXYGEN SATURATION: 97 % | HEART RATE: 93 BPM | RESPIRATION RATE: 16 BRPM | BODY MASS INDEX: 25.58 KG/M2 | DIASTOLIC BLOOD PRESSURE: 86 MMHG | TEMPERATURE: 97 F | WEIGHT: 139 LBS | SYSTOLIC BLOOD PRESSURE: 133 MMHG

## 2025-04-29 DIAGNOSIS — Z00.00 ROUTINE GENERAL MEDICAL EXAMINATION AT A HEALTH CARE FACILITY: Primary | ICD-10-CM

## 2025-04-29 DIAGNOSIS — Z12.11 SCREEN FOR COLON CANCER: ICD-10-CM

## 2025-04-29 DIAGNOSIS — F51.04 PSYCHOPHYSIOLOGICAL INSOMNIA: ICD-10-CM

## 2025-04-29 DIAGNOSIS — Z78.0 ASYMPTOMATIC POSTMENOPAUSAL STATUS: ICD-10-CM

## 2025-04-29 DIAGNOSIS — Z12.31 BREAST CANCER SCREENING BY MAMMOGRAM: ICD-10-CM

## 2025-04-29 DIAGNOSIS — E78.5 HYPERLIPIDEMIA LDL GOAL <130: ICD-10-CM

## 2025-04-29 DIAGNOSIS — Z13.1 SCREENING FOR DIABETES MELLITUS: ICD-10-CM

## 2025-04-29 DIAGNOSIS — Z28.21 VACCINATION NOT CARRIED OUT BECAUSE OF PATIENT REFUSAL: ICD-10-CM

## 2025-04-29 PROCEDURE — 82947 ASSAY GLUCOSE BLOOD QUANT: CPT | Performed by: FAMILY MEDICINE

## 2025-04-29 PROCEDURE — 3079F DIAST BP 80-89 MM HG: CPT | Performed by: FAMILY MEDICINE

## 2025-04-29 PROCEDURE — 80061 LIPID PANEL: CPT | Performed by: FAMILY MEDICINE

## 2025-04-29 PROCEDURE — 3075F SYST BP GE 130 - 139MM HG: CPT | Performed by: FAMILY MEDICINE

## 2025-04-29 PROCEDURE — 1126F AMNT PAIN NOTED NONE PRSNT: CPT | Performed by: FAMILY MEDICINE

## 2025-04-29 PROCEDURE — 99397 PER PM REEVAL EST PAT 65+ YR: CPT | Performed by: FAMILY MEDICINE

## 2025-04-29 PROCEDURE — 36415 COLL VENOUS BLD VENIPUNCTURE: CPT | Performed by: FAMILY MEDICINE

## 2025-04-29 RX ORDER — FLUTICASONE PROPIONATE 50 MCG
1 SPRAY, SUSPENSION (ML) NASAL DAILY
COMMUNITY
Start: 2025-04-02

## 2025-04-29 RX ORDER — ZOLPIDEM TARTRATE 5 MG/1
5 TABLET ORAL
Qty: 30 TABLET | Refills: 5 | Status: SHIPPED | OUTPATIENT
Start: 2025-04-29

## 2025-04-29 SDOH — HEALTH STABILITY: PHYSICAL HEALTH: ON AVERAGE, HOW MANY DAYS PER WEEK DO YOU ENGAGE IN MODERATE TO STRENUOUS EXERCISE (LIKE A BRISK WALK)?: 0 DAYS

## 2025-04-29 ASSESSMENT — SOCIAL DETERMINANTS OF HEALTH (SDOH): HOW OFTEN DO YOU GET TOGETHER WITH FRIENDS OR RELATIVES?: TWICE A WEEK

## 2025-04-29 ASSESSMENT — PAIN SCALES - GENERAL: PAINLEVEL_OUTOF10: NO PAIN (0)

## 2025-04-29 NOTE — PATIENT INSTRUCTIONS
Please call [facility]: [phone] to schedule your mammogram and bone density scan (DEXA).       At [facility], we strive to deliver an exceptional experience to you, every time we see you. If you receive a survey, please let us know what we are doing well and/or what we could improve upon, as we do value your feedback.  If you have MyChart, you can expect to receive results automatically within 24 hours of their completion.  Your provider will send a note interpreting your results as well.   If you do not have MyChart, you should receive your results in about a week by mail.    Your care team:                            Family Medicine Internal Medicine   MD Colton Brito, MD Rosalia Salas, MD Remy Marcos, MD Heide Thornton, PAPASTOR Leigh, MD Pediatrics   Hellen Brantley, MD Sue Decker, MD Aretha Taylor, APRVALENCIA CNP Marcy JALLOH CNP   Jeana Coon, MD Yesenia Perez, MD Arlet Shook, CNP     Maricruz Baxter PAFrancescaC Same-Day Provider (No follow-up visits)   SHUBHAM Howell, DNP MERCEDES Kaplan-ALEX Upton PA-C     Clinic hours: Monday - Thursday 7 am-6 pm; Fridays 7 am-5 pm.   Urgent care: Monday - Friday 10 am- 8 pm; Saturday and Sunday 9 am-5 pm.    Clinic: [phone]       [facility]: Monday - Thursday 8 am - 7 pm; Friday 8 am - 6 pm  [facility]: [phone]    Patient Education   Preventive Care Advice   This is general advice given by our system to help you stay healthy. However, your care team may have specific advice just for you. Please talk to your care team about your preventive care needs.  Nutrition  Eat 5 or more servings of fruits and vegetables each day.  Try wheat bread, brown rice and whole grain pasta (instead of white bread, rice, and pasta).  Get enough calcium and vitamin D. Check the label on  foods and aim for 100% of the RDA (recommended daily allowance).  Lifestyle  Exercise at least 150 minutes each week  (30 minutes a day, 5 days a week).  Do muscle strengthening activities 2 days a week. These help control your weight and prevent disease.  No smoking.  Wear sunscreen to prevent skin cancer.  Have a dental exam and cleaning every 6 months.  Yearly exams  See your health care team every year to talk about:  Any changes in your health.  Any medicines your care team has prescribed.  Preventive care, family planning, and ways to prevent chronic diseases.  Shots (vaccines)   HPV shots (up to age 26), if you've never had them before.  Hepatitis B shots (up to age 59), if you've never had them before.  COVID-19 shot: Get this shot when it's due.  Flu shot: Get a flu shot every year.  Tetanus shot: Get a tetanus shot every 10 years.  Pneumococcal, hepatitis A, and RSV shots: Ask your care team if you need these based on your risk.  Shingles shot (for age 50 and up)  General health tests  Diabetes screening:  Starting at age 35, Get screened for diabetes at least every 3 years.  If you are younger than age 35, ask your care team if you should be screened for diabetes.  Cholesterol test: At age 39, start having a cholesterol test every 5 years, or more often if advised.  Bone density scan (DEXA): At age 50, ask your care team if you should have this scan for osteoporosis (brittle bones).  Hepatitis C: Get tested at least once in your life.  STIs (sexually transmitted infections)  Before age 24: Ask your care team if you should be screened for STIs.  After age 24: Get screened for STIs if you're at risk. You are at risk for STIs (including HIV) if:  You are sexually active with more than one person.  You don't use condoms every time.  You or a partner was diagnosed with a sexually transmitted infection.  If you are at risk for HIV, ask about PrEP medicine to prevent HIV.  Get tested for HIV at least once in  your life, whether you are at risk for HIV or not.  Cancer screening tests  Cervical cancer screening: If you have a cervix, begin getting regular cervical cancer screening tests starting at age 21.  Breast cancer scan (mammogram): If you've ever had breasts, begin having regular mammograms starting at age 40. This is a scan to check for breast cancer.  Colon cancer screening: It is important to start screening for colon cancer at age 45.  Have a colonoscopy test every 10 years (or more often if you're at risk) Or, ask your provider about stool tests like a FIT test every year or Cologuard test every 3 years.  To learn more about your testing options, visit:   .  For help making a decision, visit:   https://bit.ly/qv01925.  Prostate cancer screening test: If you have a prostate, ask your care team if a prostate cancer screening test (PSA) at age 55 is right for you.  Lung cancer screening: If you are a current or former smoker ages 50 to 80, ask your care team if ongoing lung cancer screenings are right for you.  For informational purposes only. Not to replace the advice of your health care provider. Copyright   2023 [employer]. All rights reserved. Clinically reviewed by the [facility]itions [facility]. [employer] 838255 - REV 01/24.

## 2025-04-29 NOTE — PROGRESS NOTES
"Preventive Care Visit  [facility]  Rodney Desai MD, Family Medicine  Apr 29, 2025      Assessment & Plan     (Z00.00) Routine general medical examination at a health care facility  (primary encounter diagnosis)  Comment:   Plan: Glucose, Fecal colorectal cancer screen FIT, MA        Screen Bilateral w/Gary        Return in about 1 year (around 4/29/2026) for full physical.      (E78.5) Hyperlipidemia LDL goal <130  Comment:   Plan: Lipid panel reflex to direct LDL Non-fasting        Treatment recommendations per Pooled Cohort Equations (AHA/ACC 2013 Blood Cholesterol Guideline).     (F51.04) Psychophysiological insomnia  Comment: prescription update.  Plan: zolpidem (AMBIEN) 5 MG tablet            (Z78.0) Asymptomatic postmenopausal status  Comment: indications for screening discussed with the patient   Plan: DEXA HIP/PELVIS/SPINE - Future        Handout(s) provided.     (Z13.1) Screening for diabetes mellitus  Comment:   Plan: Glucose            (Z12.11) Screen for colon cancer  Comment: She prefers this over colonoscopy.   Plan: Fecal colorectal cancer screen FIT            (Z12.31) Breast cancer screening by mammogram  Comment: overdue. Her last one was 2018.   Plan: MA Screen Bilateral w/Gary            (Z28.21) Vaccination not carried out because of patient refusal  Comment: COVID-19, shingles, influenza, pneumococcal.   Plan:     BMI  Estimated body mass index is 25.42 kg/m  as calculated from the following:    Height as of this encounter: 1.575 m (5' 2\").    Weight as of this encounter: 63 kg (139 lb).       Counseling  Appropriate preventive services were addressed with this patient via screening, questionnaire, or discussion as appropriate for fall prevention, nutrition, physical activity, Tobacco-use cessation, social engagement, weight loss and cognition.  Checklist reviewing preventive services available has been given to the patient.  Reviewed patient's diet, addressing " concerns and/or questions.   The patient was instructed to see the dentist every 6 months.   She is at risk for psychosocial distress and has been provided with information to reduce risk.   Discussed possible causes of fatigue. The patient reports drinking more than 3 alcoholic drinks per day and/or more than 7 drhnks per week. The patient was counseled and given information about possible harmful effects of excessive alcohol intake.Updated plan of care.  Patient reported difficulty with activities of daily living were addressed today.    Da Antonio is a 65 year old, presenting for the following:  Physical        4/29/2025     3:14 PM   Additional Questions   Roomed by mary   Accompanied by self         4/29/2025     3:14 PM   Patient Reported Additional Medications   Patient reports taking the following new medications none        HPI  Advance Care Planning    Discussed advance care planning with patient; informed AVS has link to [facility].        4/29/2025   General Health   How would you rate your overall physical health? (!) FAIR   Feel stress (tense, anxious, or unable to sleep) Very much   (!) STRESS CONCERN      4/29/2025   Nutrition   Diet: Regular (no restrictions)         4/29/2025   Exercise   Days per week of moderate/strenous exercise 0 days   (!) EXERCISE CONCERN      4/29/2025   Social Factors   Frequency of gathering with friends or relatives Twice a week   Worry food won't last until get money to buy more No   Food not last or not have enough money for food? No   Do you have housing? (Housing is defined as stable permanent housing and does not include staying outside in a car, in a tent, in an abandoned building, in an overnight shelter, or couch-surfing.) Yes   Are you worried about losing your housing? No   Lack of transportation? No   Unable to get utilities (heat,electricity)? No         4/29/2025   Fall Risk   Fallen 2 or more times in the past year? No    No    No    Trouble with walking or balance? Yes    Yes    No   Gait Speed Test (Document in seconds) 2   Gait Speed Test Interpretation Less than or equal to 5.00 seconds - PASS       Multiple values from one day are sorted in reverse-chronological order          4/29/2025   Activities of Daily Living- Home Safety   Needs help with the following daily activites Transportation   Safety concerns in the home None of the above         4/29/2025   Dental   Dentist two times every year? (!) NO         4/29/2025   Hearing Screening   Hearing concerns? None of the above         4/29/2025   Driving Risk Screening   Patient/family members have concerns about driving No         4/29/2025   General Alertness/Fatigue Screening   Have you been more tired than usual lately? (!) YES         4/29/2025   Urinary Incontinence Screening   Bothered by leaking urine in past 6 months No       Today's PHQ-9 Score:       4/29/2025     3:04 PM   PHQ-9 SCORE   PHQ-9 Total Score MyChart 3 (Minimal depression)   PHQ-9 Total Score 3        Patient-reported         4/29/2025   Substance Use   If I could quit smoking, I would Somewhat disagree   I want to quit somking, worry about health affects Completely agree   Willing to make a plan to quit smoking Somewhat disagree   Willing to cut down before quitting Completely agree   Alcohol more than 3/day or more than 7/wk Yes   How often do you have a drink containing alcohol 2 to 3 times a week   How many alcohol drinks on typical day 3 or 4   How often do you have 5+ drinks at one occasion Weekly   Audit 2/3 Score 4   How often not able to stop drinking once started Never   How often failed to do what normally expected Never   How often needed first drink in am after a heavy drinking session Never   How often feeling of guilt or remorse after drinking Never   How often unable to remember what happened the night before Never   Have you or someone else been injured because of your drinking No   Has anyone been  concerned or suggested you cut down on drinking No   TOTAL SCORE - AUDIT 7   Do you have a current opioid prescription? No   How severe/bad is pain from 1 to 10? 0/10 (No Pain)   Do you use any other substances recreationally? No     Social History     Tobacco Use    Smoking status: Every Day     Current packs/day: 0.50     Average packs/day: 0.5 packs/day for 13.3 years (6.7 ttl pk-yrs)     Types: Cigarettes     Start date: 1/9/2012    Smokeless tobacco: Never    Tobacco comments:     Smoked as teenager 4-5 years, 5 cigarettes per day. Quit and then restarted at age 48, 6-7 cigarettes per day up to now. Spring 2025 smokes 1 at work or 3-4 throughout a day.    Vaping Use    Vaping status: Never Used   Substance Use Topics    Alcohol use: Yes     Alcohol/week: 6.0 standard drinks of alcohol     Types: 6 Standard drinks or equivalent per week    Drug use: No          Mammogram Screening - Mammogram every 1-2 years updated in Health Maintenance based on mutual decision making      History of abnormal Pap smear: No - age 65 or older with adequate negative prior screening test results (3 consecutive negative cytology results, 2 consecutive negative cotesting results, or 2 consecutive negative HrHPV test results within 10 years, with the most recent test occurring within the recommended screening interval for the test used)       ASCVD Risk   The 10-year ASCVD risk score (Jourdan JULES, et al., 2019) is: 11.5%    Values used to calculate the score:      Age: 65 years      Sex: Female      Is Non- : No      Diabetic: No      Tobacco smoker: Yes      Systolic Blood Pressure: 133 mmHg      Is BP treated: No      HDL Cholesterol: 66 mg/dL      Total Cholesterol: 258 mg/dL        Reviewed and updated as needed this visit by Provider   Tobacco   Meds  Problems  Med Hx  Surg Hx  Fam Hx            Current providers sharing in care for this patient include:  Patient Care Team:  Rodney Desai,  "MD as PCP - General (Family Medicine)  Rodney Desai MD as Assigned PCP    The following health maintenance items are reviewed in Epic and correct as of today:  Health Maintenance   Topic Date Due    DEXA  Never done    Pneumococcal Vaccine: 50+ Years (1 of 2 - PCV) Never done    ZOSTER IMMUNIZATION (1 of 2) Never done    MAMMO SCREENING  09/21/2020    ADVANCE CARE PLANNING  07/19/2021    COLORECTAL CANCER SCREENING  01/01/2023    INFLUENZA VACCINE (1) Never done    COVID-19 Vaccine (1 - 2024-25 season) Never done    LIPID  04/11/2025    LUNG CANCER SCREENING  04/29/2026 (Originally 1/16/2010)    DTAP/TDAP/TD IMMUNIZATION (2 - Td or Tdap) 06/16/2025    PHQ-9  10/29/2025    NICOTINE/TOBACCO CESSATION COUNSELING Q 1 YR  12/03/2025    MEDICARE ANNUAL WELLNESS VISIT  04/29/2026    ANNUAL REVIEW OF HM ORDERS  04/29/2026    FALL RISK ASSESSMENT  04/29/2026    DIABETES SCREENING  04/11/2027    RSV VACCINE (1 - 1-dose 75+ series) 01/16/2035    HEPATITIS C SCREENING  Completed    HIV SCREENING  Completed    DEPRESSION ACTION PLAN  Completed    HPV IMMUNIZATION  Aged Out    MENINGITIS IMMUNIZATION  Aged Out    PAP  Discontinued     Review of Systems       Objective    Exam  /86 (BP Location: Left arm, Patient Position: Sitting, Cuff Size: Adult Regular)   Pulse 93   Temp 97  F (36.1  C) (Temporal)   Resp 16   Ht 1.575 m (5' 2\")   Wt 63 kg (139 lb)   LMP  (LMP Unknown)   SpO2 97%   BMI 25.42 kg/m     Estimated body mass index is 25.42 kg/m  as calculated from the following:    Height as of this encounter: 1.575 m (5' 2\").    Weight as of this encounter: 63 kg (139 lb).    Physical Exam  GENERAL: alert and no distress  EYES: Eyes grossly normal to inspection, PERRL and conjunctivae and sclerae normal  HENT: ear canals and TM's normal, nose and mouth without ulcers or lesions  NECK: no adenopathy, no asymmetry, masses, or scars  RESP: lungs clear to auscultation - no rales, rhonchi or wheezes  BREAST: normal " without masses, tenderness or nipple discharge and no palpable axillary masses or adenopathy  CV: regular rate and rhythm, normal S1 S2, no S3 or S4, no murmur, click or rub, no peripheral edema  ABDOMEN: soft, nontender, no hepatosplenomegaly, no masses and bowel sounds normal  MS: no gross musculoskeletal defects noted, no edema  SKIN: no suspicious lesions or rashes  NEURO: Normal strength and tone, mentation intact and speech normal  PSYCH: mentation appears normal, affect normal/bright        4/29/2025   Mini Cog   Clock Draw Score 2 Normal   3 Item Recall 3 objects recalled   Mini Cog Total Score 5         This document serves as a record of the services and decisions personally performed and made by Dr. Desai. It was created on his behalf by Meseret Keenan, a trained medical scribe. The creation of this document is based the provider's statements to the medical scribe.  Meseret Keenan, 4:11 PM         Signed Electronically by: Rodney Desai MD    Answers submitted by the patient for this visit:  Patient Health Questionnaire (Submitted on 4/29/2025)  If you checked off any problems, how difficult have these problems made it for you to do your work, take care of things at home, or get along with other people?: Not difficult at all  PHQ9 TOTAL SCORE: 3

## 2025-04-30 ENCOUNTER — PATIENT OUTREACH (OUTPATIENT)
Dept: CARE COORDINATION | Facility: CLINIC | Age: 65
End: 2025-04-30
Payer: COMMERCIAL

## 2025-04-30 ENCOUNTER — TELEPHONE (OUTPATIENT)
Dept: FAMILY MEDICINE | Facility: CLINIC | Age: 65
End: 2025-04-30

## 2025-04-30 LAB
CHOLEST SERPL-MCNC: 304 MG/DL
FASTING STATUS PATIENT QL REPORTED: YES
FASTING STATUS PATIENT QL REPORTED: YES
GLUCOSE SERPL-MCNC: 87 MG/DL (ref 70–99)
HDLC SERPL-MCNC: 68 MG/DL
LDLC SERPL CALC-MCNC: 180 MG/DL
NONHDLC SERPL-MCNC: 236 MG/DL
TRIGL SERPL-MCNC: 279 MG/DL

## 2025-05-07 NOTE — TELEPHONE ENCOUNTER
Prior Authorization Approval    Medication: ZOLPIDEM TARTRATE 5 MG PO TABS  Authorization Effective Date: 4/3/2025  Authorization Expiration Date: 5/3/2026  Approved Dose/Quantity:   Reference #:     Insurance Company: [employer] - Phone [phone] Fax [phone]  Expected CoPay: $    CoPay Card Available:      Financial Assistance Needed:   Which Pharmacy is filling the prescription: [id number] [place] [address] [place] AT [facility] [address]  Pharmacy Notified: Yes  Patient Notified: **Instructed pharmacy to notify patient when script is ready to /ship.**

## 2025-06-04 ENCOUNTER — VIRTUAL VISIT (OUTPATIENT)
Dept: FAMILY MEDICINE | Facility: CLINIC | Age: 65
End: 2025-06-04
Payer: COMMERCIAL

## 2025-06-04 DIAGNOSIS — J01.90 ACUTE SINUSITIS WITH SYMPTOMS > 10 DAYS: Primary | ICD-10-CM

## 2025-06-04 PROCEDURE — 98013 SYNCH AUDIO-ONLY EST LOW 20: CPT

## 2025-06-04 RX ORDER — AMOXICILLIN 875 MG/1
875 TABLET, COATED ORAL 2 TIMES DAILY
Qty: 14 TABLET | Refills: 0 | Status: SHIPPED | OUTPATIENT
Start: 2025-06-04 | End: 2025-06-11

## 2025-06-04 NOTE — PATIENT INSTRUCTIONS
Suggest symptom management with: oral analgesics such as acetaminophen or ibuprofen, intranasal glucocorticoid such as fluticasone or azelastine, oxymetalazone (AFRIN - for no longer than 3 days), oral decongestants, expectorants, and oral antihistamines. Nasal lavage and steaming can be beneficial in loosening and mobilizing secretions as well.     Recommend ibuprofen as needed for pain/discomfort, Afrin for up to 3 days, humidification at night, nasal saline rinses as tolerated.     7 day course of amoxicillin, twice a day

## 2025-06-04 NOTE — PROGRESS NOTES
Paco is a 65 year old who is being evaluated via a billable telephone visit.    What phone number would you like to be contacted at? [phone]  How would you like to obtain your AVS? Mail a copy  Originating Location (pt. Location): Home    Distant Location (provider location):  On-site  Telephone visit completed due to the patient did not have access to video, while the distant provider did.    Assessment & Plan     Acute sinusitis with symptoms > 10 days  Recommend antibiotic treatment. Historically has not tolerated Augmentin well, prefers amoxicillin. Plan to stay hydrated, manage symptoms with OTC ibuprofen, continued use of flonase, and consider use of afrin x no more than 3 days, humidification, and nasal saline rinses. Patient verbalized understanding and is in agreement with plan of care.   - amoxicillin (AMOXIL) 875 MG tablet; Take 1 tablet (875 mg) by mouth 2 times daily for 7 days.        Subjective   Paco is a 65 year old, presenting for the following health issues:  Sinus Problem        6/4/2025     1:07 PM   Additional Questions   Roomed by Vivian DALEY      Acute Illness  Acute illness concerns: Sinus congestion and pain  Onset/Duration: 10+ days  Symptoms:  Fever: No  Chills/Sweats: YES  Headache (location?): YES - left sided sinus, upper and lower  Sinus Pressure: YES  Conjunctivitis:  No  Ear Pain: no  Rhinorrhea: No  Congestion: YES- sinus  Sore Throat: No  Cough: no  Wheeze: No  Decreased Appetite: No  Nausea: No  Vomiting: No  Diarrhea: No  Dysuria/Freq.: No  Dysuria or Hematuria: No  Fatigue/Achiness: YES  Sick/Strep Exposure: YES  Therapies tried and outcome: Flonase, Advil cold and sinus. Pseudofed all with only some relief    Feels sinus pain and pressure is not improving, ongoing x 10 days. Notes some hot flashes and chills. Notes thick green nasal drainage. Advil cold and sinus during the day. Did try nasal saline rinses, notes she gags.           Objective       Vitals:  No  vitals were obtained today due to virtual visit.    Physical Exam   General: Alert and no distress //Respiratory: No audible wheeze, cough, or shortness of breath // Psychiatric:  Appropriate affect, tone, and pace of words        Phone call duration: 7 minutes  Signed Electronically by: SHUBHAM Johnson CNP

## 2025-07-29 ENCOUNTER — TELEPHONE (OUTPATIENT)
Dept: FAMILY MEDICINE | Facility: CLINIC | Age: 65
End: 2025-07-29
Payer: COMMERCIAL

## 2025-07-29 NOTE — TELEPHONE ENCOUNTER
Patient Quality Outreach    Patient is due for the following:   Breast Cancer Screening - Mammogram    Action(s) Taken:   Schedule a office visit for Mammogram screening    Type of outreach:    Sent letter.    Questions for provider review:    None         Deepa Amos MA  Chart routed to None.

## 2025-07-29 NOTE — LETTER
July 29, 2025    Paco Medrano  [address] [place]  [facility] [address]    Dear Paco,    At [facility] we care about your health and are committed to providing quality patient care.     Here is a list of Health Maintenance topics that are due now or due soon:  Health Maintenance Due   Topic Date Due    DEXA  Never done    PNEUMOCOCCAL VACCINE 50+ YEARS (1 of 2 - PCV) Never done    ZOSTER VACCINE (1 of 2) Never done    MAMMO SCREENING  09/21/2020    COLORECTAL CANCER SCREENING  01/01/2023    COVID-19 VACCINE (1 - 2024-25 season) Never done    DTAP/TDAP/TD VACCINE (2 - Td or Tdap) 06/16/2025        We are recommending that you:  Schedule a MAMMOGRAM which is due.    1 in 8 women will develop invasive breast cancer during her lifetime and it is the most common non-skin cancer in [place] women.  EARLY detection, new treatments, and a better understanding of the disease have increased survival rates - the 5 year survival rate in the 1960s was 63% and today it is close to 90%.    If you are under/uninsured, we recommend you contact [facility]. They offer mammograms at no charge or on a sliding fee charge. You can schedule with them at [phone]. Please have them send us the results.      Please disregard this reminder if you have had this exam elsewhere within the last year.  It would be helpful for us to have a copy of your mammogram report in your file so that we can best coordinate your care - please contact us with when your test was done so we can update your record.    To schedule an appointment or discuss this further, you may contact us by phone at the [facility] at [phone] or online through the patient [email or web address] @ [email or web address]    Thank you for trusting [facility] and we appreciate the opportunity to serve you.  We look forward to supporting your healthcare needs in the future.    Your partners in  health,      Quality Committee at [facility]